# Patient Record
Sex: FEMALE | Race: WHITE | Employment: UNEMPLOYED | URBAN - METROPOLITAN AREA
[De-identification: names, ages, dates, MRNs, and addresses within clinical notes are randomized per-mention and may not be internally consistent; named-entity substitution may affect disease eponyms.]

---

## 2017-01-24 RX ORDER — BENAZEPRIL HYDROCHLORIDE 20 MG/1
TABLET ORAL
Qty: 90 TABLET | Refills: 1 | Status: SHIPPED | OUTPATIENT
Start: 2017-01-24 | End: 2017-07-23 | Stop reason: SDUPTHER

## 2017-03-28 ENCOUNTER — TELEPHONE (OUTPATIENT)
Dept: FAMILY MEDICINE CLINIC | Age: 52
End: 2017-03-28

## 2017-03-28 DIAGNOSIS — E10.9 TYPE 1 DIABETES MELLITUS ON INSULIN THERAPY (HCC): Primary | ICD-10-CM

## 2017-03-28 DIAGNOSIS — Z13.5 DIABETIC RETINOPATHY SCREENING: ICD-10-CM

## 2017-04-13 ENCOUNTER — TELEPHONE (OUTPATIENT)
Dept: FAMILY MEDICINE CLINIC | Age: 52
End: 2017-04-13

## 2017-04-13 DIAGNOSIS — M25.551 BILATERAL HIP PAIN: Primary | ICD-10-CM

## 2017-04-13 DIAGNOSIS — M25.552 BILATERAL HIP PAIN: Primary | ICD-10-CM

## 2017-05-04 ENCOUNTER — OFFICE VISIT (OUTPATIENT)
Dept: FAMILY MEDICINE CLINIC | Age: 52
End: 2017-05-04

## 2017-05-04 VITALS
SYSTOLIC BLOOD PRESSURE: 143 MMHG | DIASTOLIC BLOOD PRESSURE: 78 MMHG | OXYGEN SATURATION: 97 % | WEIGHT: 223 LBS | BODY MASS INDEX: 42.1 KG/M2 | HEART RATE: 85 BPM | HEIGHT: 61 IN

## 2017-05-04 DIAGNOSIS — E10.9 TYPE 1 DIABETES MELLITUS ON INSULIN THERAPY (HCC): ICD-10-CM

## 2017-05-04 DIAGNOSIS — M16.0 BILATERAL HIP JOINT ARTHRITIS: ICD-10-CM

## 2017-05-04 DIAGNOSIS — E78.5 DYSLIPIDEMIA: ICD-10-CM

## 2017-05-04 DIAGNOSIS — D22.72: ICD-10-CM

## 2017-05-04 DIAGNOSIS — E10.9 TYPE 1 DIABETES MELLITUS ON INSULIN THERAPY (HCC): Primary | ICD-10-CM

## 2017-05-04 DIAGNOSIS — R76.8 POSITIVE ANA (ANTINUCLEAR ANTIBODY): ICD-10-CM

## 2017-05-04 LAB
A/G RATIO: 1.7 (ref 1.1–2.2)
ALBUMIN SERPL-MCNC: 4.2 G/DL (ref 3.4–5)
ALP BLD-CCNC: 94 U/L (ref 40–129)
ALT SERPL-CCNC: 11 U/L (ref 10–40)
ANION GAP SERPL CALCULATED.3IONS-SCNC: 15 MMOL/L (ref 3–16)
AST SERPL-CCNC: 11 U/L (ref 15–37)
BILIRUB SERPL-MCNC: 0.3 MG/DL (ref 0–1)
BUN BLDV-MCNC: 11 MG/DL (ref 7–20)
CALCIUM SERPL-MCNC: 9.3 MG/DL (ref 8.3–10.6)
CHLORIDE BLD-SCNC: 103 MMOL/L (ref 99–110)
CHOLESTEROL, TOTAL: 305 MG/DL (ref 0–199)
CO2: 25 MMOL/L (ref 21–32)
CREAT SERPL-MCNC: 0.8 MG/DL (ref 0.6–1.1)
GFR AFRICAN AMERICAN: >60
GFR NON-AFRICAN AMERICAN: >60
GLOBULIN: 2.5 G/DL
GLUCOSE BLD-MCNC: 118 MG/DL (ref 70–99)
HCT VFR BLD CALC: 42.6 % (ref 36–48)
HDLC SERPL-MCNC: 55 MG/DL (ref 40–60)
HEMOGLOBIN: 14.3 G/DL (ref 12–16)
LDL CHOLESTEROL CALCULATED: 225 MG/DL
MCH RBC QN AUTO: 30.3 PG (ref 26–34)
MCHC RBC AUTO-ENTMCNC: 33.7 G/DL (ref 31–36)
MCV RBC AUTO: 89.9 FL (ref 80–100)
PDW BLD-RTO: 13.2 % (ref 12.4–15.4)
PLATELET # BLD: 160 K/UL (ref 135–450)
PMV BLD AUTO: 11.4 FL (ref 5–10.5)
POTASSIUM SERPL-SCNC: 4.6 MMOL/L (ref 3.5–5.1)
RBC # BLD: 4.74 M/UL (ref 4–5.2)
SEDIMENTATION RATE, ERYTHROCYTE: 13 MM/HR (ref 0–30)
SODIUM BLD-SCNC: 143 MMOL/L (ref 136–145)
TOTAL CK: 61 U/L (ref 26–192)
TOTAL PROTEIN: 6.7 G/DL (ref 6.4–8.2)
TRIGL SERPL-MCNC: 124 MG/DL (ref 0–150)
VLDLC SERPL CALC-MCNC: 25 MG/DL
WBC # BLD: 5.8 K/UL (ref 4–11)

## 2017-05-04 PROCEDURE — 99213 OFFICE O/P EST LOW 20 MIN: CPT | Performed by: FAMILY MEDICINE

## 2017-05-04 RX ORDER — INSULIN GLARGINE 100 [IU]/ML
INJECTION, SOLUTION SUBCUTANEOUS
Qty: 5 VIAL | Refills: 2 | Status: SHIPPED | OUTPATIENT
Start: 2017-05-04 | End: 2017-11-01 | Stop reason: SDUPTHER

## 2017-05-04 RX ORDER — ROSUVASTATIN CALCIUM 10 MG/1
10 TABLET, COATED ORAL NIGHTLY
Qty: 90 TABLET | Refills: 3 | Status: SHIPPED | OUTPATIENT
Start: 2017-05-04 | End: 2018-07-30 | Stop reason: SDUPTHER

## 2017-05-04 ASSESSMENT — PATIENT HEALTH QUESTIONNAIRE - PHQ9
SUM OF ALL RESPONSES TO PHQ QUESTIONS 1-9: 1
SUM OF ALL RESPONSES TO PHQ9 QUESTIONS 1 & 2: 1
2. FEELING DOWN, DEPRESSED OR HOPELESS: 1
1. LITTLE INTEREST OR PLEASURE IN DOING THINGS: 0

## 2017-05-05 LAB
ESTIMATED AVERAGE GLUCOSE: 185.8 MG/DL
HBA1C MFR BLD: 8.1 %

## 2017-05-06 LAB — CCP IGG ANTIBODIES: 3 UNITS (ref 0–19)

## 2017-06-20 ENCOUNTER — PATIENT MESSAGE (OUTPATIENT)
Dept: FAMILY MEDICINE CLINIC | Age: 52
End: 2017-06-20

## 2017-06-20 DIAGNOSIS — G89.29 CHRONIC RIGHT SHOULDER PAIN: Primary | ICD-10-CM

## 2017-06-20 DIAGNOSIS — M25.511 CHRONIC RIGHT SHOULDER PAIN: Primary | ICD-10-CM

## 2017-07-11 ENCOUNTER — OFFICE VISIT (OUTPATIENT)
Dept: ORTHOPEDIC SURGERY | Age: 52
End: 2017-07-11

## 2017-07-11 VITALS
DIASTOLIC BLOOD PRESSURE: 78 MMHG | WEIGHT: 223 LBS | HEIGHT: 61 IN | HEART RATE: 87 BPM | BODY MASS INDEX: 42.1 KG/M2 | SYSTOLIC BLOOD PRESSURE: 136 MMHG

## 2017-07-11 DIAGNOSIS — M75.01 ADHESIVE CAPSULITIS OF RIGHT SHOULDER: Primary | ICD-10-CM

## 2017-07-11 DIAGNOSIS — M25.511 RIGHT SHOULDER PAIN, UNSPECIFIED CHRONICITY: ICD-10-CM

## 2017-07-11 PROCEDURE — 20610 DRAIN/INJ JOINT/BURSA W/O US: CPT | Performed by: ORTHOPAEDIC SURGERY

## 2017-07-11 PROCEDURE — 99214 OFFICE O/P EST MOD 30 MIN: CPT | Performed by: ORTHOPAEDIC SURGERY

## 2017-07-17 ENCOUNTER — EVALUATION (OUTPATIENT)
Dept: PHYSICAL THERAPY | Age: 52
End: 2017-07-17

## 2017-07-17 DIAGNOSIS — M25.511 PAIN IN JOINT OF RIGHT SHOULDER: ICD-10-CM

## 2017-07-17 DIAGNOSIS — M75.01 ADHESIVE CAPSULITIS OF RIGHT SHOULDER: Primary | ICD-10-CM

## 2017-07-24 ENCOUNTER — EVALUATION (OUTPATIENT)
Dept: PHYSICAL THERAPY | Age: 52
End: 2017-07-24

## 2017-07-24 DIAGNOSIS — M25.511 PAIN IN JOINT OF RIGHT SHOULDER: ICD-10-CM

## 2017-07-24 DIAGNOSIS — M75.01 ADHESIVE CAPSULITIS OF RIGHT SHOULDER: Primary | ICD-10-CM

## 2017-07-24 PROCEDURE — G8985 CARRY GOAL STATUS: HCPCS | Performed by: PHYSICAL THERAPIST

## 2017-07-24 PROCEDURE — G8984 CARRY CURRENT STATUS: HCPCS | Performed by: PHYSICAL THERAPIST

## 2017-07-24 PROCEDURE — 97110 THERAPEUTIC EXERCISES: CPT | Performed by: PHYSICAL THERAPIST

## 2017-07-24 PROCEDURE — 97161 PT EVAL LOW COMPLEX 20 MIN: CPT | Performed by: PHYSICAL THERAPIST

## 2017-07-24 PROCEDURE — 97112 NEUROMUSCULAR REEDUCATION: CPT | Performed by: PHYSICAL THERAPIST

## 2017-07-25 RX ORDER — BENAZEPRIL HYDROCHLORIDE 20 MG/1
TABLET ORAL
Qty: 90 TABLET | Refills: 1 | Status: SHIPPED | OUTPATIENT
Start: 2017-07-25 | End: 2018-01-21 | Stop reason: SDUPTHER

## 2017-07-26 ENCOUNTER — TREATMENT (OUTPATIENT)
Dept: PHYSICAL THERAPY | Age: 52
End: 2017-07-26

## 2017-07-26 DIAGNOSIS — M75.01 ADHESIVE CAPSULITIS OF RIGHT SHOULDER: Primary | ICD-10-CM

## 2017-07-26 DIAGNOSIS — M25.511 PAIN IN JOINT OF RIGHT SHOULDER: ICD-10-CM

## 2017-07-26 PROCEDURE — 97530 THERAPEUTIC ACTIVITIES: CPT | Performed by: PHYSICAL THERAPIST

## 2017-07-26 PROCEDURE — 97140 MANUAL THERAPY 1/> REGIONS: CPT | Performed by: PHYSICAL THERAPIST

## 2017-07-26 PROCEDURE — 97110 THERAPEUTIC EXERCISES: CPT | Performed by: PHYSICAL THERAPIST

## 2017-07-26 PROCEDURE — 97112 NEUROMUSCULAR REEDUCATION: CPT | Performed by: PHYSICAL THERAPIST

## 2017-07-31 ENCOUNTER — TREATMENT (OUTPATIENT)
Dept: PHYSICAL THERAPY | Age: 52
End: 2017-07-31

## 2017-07-31 DIAGNOSIS — M75.01 ADHESIVE CAPSULITIS OF RIGHT SHOULDER: Primary | ICD-10-CM

## 2017-07-31 DIAGNOSIS — M25.511 PAIN IN JOINT OF RIGHT SHOULDER: ICD-10-CM

## 2017-07-31 PROCEDURE — 97140 MANUAL THERAPY 1/> REGIONS: CPT | Performed by: PHYSICAL THERAPIST

## 2017-07-31 PROCEDURE — 97530 THERAPEUTIC ACTIVITIES: CPT | Performed by: PHYSICAL THERAPIST

## 2017-07-31 PROCEDURE — 97110 THERAPEUTIC EXERCISES: CPT | Performed by: PHYSICAL THERAPIST

## 2017-08-03 ENCOUNTER — TREATMENT (OUTPATIENT)
Dept: PHYSICAL THERAPY | Age: 52
End: 2017-08-03

## 2017-08-03 DIAGNOSIS — M75.01 ADHESIVE CAPSULITIS OF RIGHT SHOULDER: Primary | ICD-10-CM

## 2017-08-03 DIAGNOSIS — M25.511 PAIN IN JOINT OF RIGHT SHOULDER: ICD-10-CM

## 2017-08-03 PROCEDURE — 97530 THERAPEUTIC ACTIVITIES: CPT | Performed by: PHYSICAL THERAPIST

## 2017-08-03 PROCEDURE — 97110 THERAPEUTIC EXERCISES: CPT | Performed by: PHYSICAL THERAPIST

## 2017-08-03 PROCEDURE — 97140 MANUAL THERAPY 1/> REGIONS: CPT | Performed by: PHYSICAL THERAPIST

## 2017-08-03 PROCEDURE — 97112 NEUROMUSCULAR REEDUCATION: CPT | Performed by: PHYSICAL THERAPIST

## 2017-08-15 ENCOUNTER — TREATMENT (OUTPATIENT)
Dept: PHYSICAL THERAPY | Age: 52
End: 2017-08-15

## 2017-08-15 DIAGNOSIS — M25.511 PAIN IN JOINT OF RIGHT SHOULDER: ICD-10-CM

## 2017-08-15 DIAGNOSIS — M75.01 ADHESIVE CAPSULITIS OF RIGHT SHOULDER: Primary | ICD-10-CM

## 2017-08-15 PROCEDURE — 97110 THERAPEUTIC EXERCISES: CPT | Performed by: PHYSICAL THERAPIST

## 2017-08-15 PROCEDURE — 97140 MANUAL THERAPY 1/> REGIONS: CPT | Performed by: PHYSICAL THERAPIST

## 2017-08-15 PROCEDURE — 97530 THERAPEUTIC ACTIVITIES: CPT | Performed by: PHYSICAL THERAPIST

## 2017-08-18 ENCOUNTER — TREATMENT (OUTPATIENT)
Dept: PHYSICAL THERAPY | Age: 52
End: 2017-08-18

## 2017-08-18 DIAGNOSIS — M25.511 PAIN IN JOINT OF RIGHT SHOULDER: ICD-10-CM

## 2017-08-18 DIAGNOSIS — M75.01 ADHESIVE CAPSULITIS OF RIGHT SHOULDER: Primary | ICD-10-CM

## 2017-08-22 ENCOUNTER — OFFICE VISIT (OUTPATIENT)
Dept: ORTHOPEDIC SURGERY | Age: 52
End: 2017-08-22

## 2017-08-22 ENCOUNTER — TREATMENT (OUTPATIENT)
Dept: PHYSICAL THERAPY | Age: 52
End: 2017-08-22

## 2017-08-22 VITALS
SYSTOLIC BLOOD PRESSURE: 142 MMHG | HEIGHT: 61 IN | WEIGHT: 223.11 LBS | DIASTOLIC BLOOD PRESSURE: 78 MMHG | HEART RATE: 80 BPM | BODY MASS INDEX: 42.12 KG/M2

## 2017-08-22 DIAGNOSIS — M25.511 PAIN IN JOINT OF RIGHT SHOULDER: ICD-10-CM

## 2017-08-22 DIAGNOSIS — M75.01 ADHESIVE CAPSULITIS OF RIGHT SHOULDER: Primary | ICD-10-CM

## 2017-08-22 PROCEDURE — 97140 MANUAL THERAPY 1/> REGIONS: CPT | Performed by: PHYSICAL THERAPIST

## 2017-08-22 PROCEDURE — 97530 THERAPEUTIC ACTIVITIES: CPT | Performed by: PHYSICAL THERAPIST

## 2017-08-22 PROCEDURE — 97110 THERAPEUTIC EXERCISES: CPT | Performed by: PHYSICAL THERAPIST

## 2017-08-22 PROCEDURE — 99213 OFFICE O/P EST LOW 20 MIN: CPT | Performed by: ORTHOPAEDIC SURGERY

## 2017-08-22 RX ORDER — TRAMADOL HYDROCHLORIDE 50 MG/1
50 TABLET ORAL EVERY 6 HOURS PRN
Qty: 40 TABLET | Refills: 0 | Status: SHIPPED | OUTPATIENT
Start: 2017-08-22 | End: 2017-09-01

## 2017-08-22 RX ORDER — MELOXICAM 15 MG/1
TABLET ORAL
Qty: 30 TABLET | Refills: 3 | Status: SHIPPED | OUTPATIENT
Start: 2017-08-22 | End: 2018-02-23 | Stop reason: ALTCHOICE

## 2017-08-24 ENCOUNTER — TREATMENT (OUTPATIENT)
Dept: PHYSICAL THERAPY | Age: 52
End: 2017-08-24

## 2017-08-24 DIAGNOSIS — M25.511 PAIN IN JOINT OF RIGHT SHOULDER: ICD-10-CM

## 2017-08-24 DIAGNOSIS — M75.01 ADHESIVE CAPSULITIS OF RIGHT SHOULDER: Primary | ICD-10-CM

## 2017-08-24 PROCEDURE — 97110 THERAPEUTIC EXERCISES: CPT | Performed by: PHYSICAL THERAPIST

## 2017-08-24 PROCEDURE — 97140 MANUAL THERAPY 1/> REGIONS: CPT | Performed by: PHYSICAL THERAPIST

## 2017-08-24 PROCEDURE — 97530 THERAPEUTIC ACTIVITIES: CPT | Performed by: PHYSICAL THERAPIST

## 2017-08-29 ENCOUNTER — TREATMENT (OUTPATIENT)
Dept: PHYSICAL THERAPY | Age: 52
End: 2017-08-29

## 2017-08-29 DIAGNOSIS — M25.511 PAIN IN JOINT OF RIGHT SHOULDER: ICD-10-CM

## 2017-08-29 DIAGNOSIS — M75.01 ADHESIVE CAPSULITIS OF RIGHT SHOULDER: Primary | ICD-10-CM

## 2017-08-29 PROCEDURE — G8985 CARRY GOAL STATUS: HCPCS | Performed by: PHYSICAL THERAPIST

## 2017-08-29 PROCEDURE — 97140 MANUAL THERAPY 1/> REGIONS: CPT | Performed by: PHYSICAL THERAPIST

## 2017-08-29 PROCEDURE — 97110 THERAPEUTIC EXERCISES: CPT | Performed by: PHYSICAL THERAPIST

## 2017-08-29 PROCEDURE — G8984 CARRY CURRENT STATUS: HCPCS | Performed by: PHYSICAL THERAPIST

## 2017-08-29 PROCEDURE — 97530 THERAPEUTIC ACTIVITIES: CPT | Performed by: PHYSICAL THERAPIST

## 2017-08-30 ENCOUNTER — OFFICE VISIT (OUTPATIENT)
Dept: FAMILY MEDICINE CLINIC | Age: 52
End: 2017-08-30

## 2017-08-30 VITALS
RESPIRATION RATE: 20 BRPM | WEIGHT: 223 LBS | HEART RATE: 96 BPM | OXYGEN SATURATION: 97 % | TEMPERATURE: 98.7 F | BODY MASS INDEX: 42.1 KG/M2 | DIASTOLIC BLOOD PRESSURE: 55 MMHG | SYSTOLIC BLOOD PRESSURE: 139 MMHG | HEIGHT: 61 IN

## 2017-08-30 DIAGNOSIS — E10.9 TYPE 1 DIABETES MELLITUS WITHOUT COMPLICATION (HCC): ICD-10-CM

## 2017-08-30 DIAGNOSIS — E78.5 OTHER AND UNSPECIFIED HYPERLIPIDEMIA: ICD-10-CM

## 2017-08-30 DIAGNOSIS — E10.9 TYPE 1 DIABETES MELLITUS WITHOUT COMPLICATION (HCC): Primary | ICD-10-CM

## 2017-08-30 DIAGNOSIS — R07.9 CHEST PAIN, UNSPECIFIED TYPE: ICD-10-CM

## 2017-08-30 DIAGNOSIS — R23.2 HOT FLASHES: ICD-10-CM

## 2017-08-30 LAB
A/G RATIO: 1.6 (ref 1.1–2.2)
ALBUMIN SERPL-MCNC: 3.9 G/DL (ref 3.4–5)
ALP BLD-CCNC: 71 U/L (ref 40–129)
ALT SERPL-CCNC: 9 U/L (ref 10–40)
ANION GAP SERPL CALCULATED.3IONS-SCNC: 13 MMOL/L (ref 3–16)
AST SERPL-CCNC: 11 U/L (ref 15–37)
BILIRUB SERPL-MCNC: 0.4 MG/DL (ref 0–1)
BUN BLDV-MCNC: 10 MG/DL (ref 7–20)
CALCIUM SERPL-MCNC: 9.4 MG/DL (ref 8.3–10.6)
CHLORIDE BLD-SCNC: 103 MMOL/L (ref 99–110)
CO2: 26 MMOL/L (ref 21–32)
CREAT SERPL-MCNC: 0.6 MG/DL (ref 0.6–1.1)
CREATININE URINE: 69.1 MG/DL (ref 28–259)
GFR AFRICAN AMERICAN: >60
GFR NON-AFRICAN AMERICAN: >60
GLOBULIN: 2.5 G/DL
GLUCOSE BLD-MCNC: 211 MG/DL (ref 70–99)
MICROALBUMIN UR-MCNC: 6.5 MG/DL
MICROALBUMIN/CREAT UR-RTO: 94.1 MG/G (ref 0–30)
POTASSIUM SERPL-SCNC: 4.2 MMOL/L (ref 3.5–5.1)
PRO-BNP: 107 PG/ML (ref 0–124)
SODIUM BLD-SCNC: 142 MMOL/L (ref 136–145)
TOTAL PROTEIN: 6.4 G/DL (ref 6.4–8.2)
TROPONIN: <0.01 NG/ML

## 2017-08-30 PROCEDURE — 93000 ELECTROCARDIOGRAM COMPLETE: CPT | Performed by: FAMILY MEDICINE

## 2017-08-30 PROCEDURE — 99214 OFFICE O/P EST MOD 30 MIN: CPT | Performed by: FAMILY MEDICINE

## 2017-08-30 RX ORDER — VENLAFAXINE HYDROCHLORIDE 75 MG/1
75 CAPSULE, EXTENDED RELEASE ORAL DAILY
Qty: 30 CAPSULE | Refills: 3 | Status: SHIPPED | OUTPATIENT
Start: 2017-08-30 | End: 2017-12-12 | Stop reason: ALTCHOICE

## 2017-08-31 ENCOUNTER — TREATMENT (OUTPATIENT)
Dept: PHYSICAL THERAPY | Age: 52
End: 2017-08-31

## 2017-08-31 DIAGNOSIS — M25.511 PAIN IN JOINT OF RIGHT SHOULDER: ICD-10-CM

## 2017-08-31 DIAGNOSIS — M75.01 ADHESIVE CAPSULITIS OF RIGHT SHOULDER: Primary | ICD-10-CM

## 2017-08-31 LAB
ESTIMATED AVERAGE GLUCOSE: 177.2 MG/DL
HBA1C MFR BLD: 7.8 %

## 2017-08-31 PROCEDURE — 97530 THERAPEUTIC ACTIVITIES: CPT | Performed by: PHYSICAL THERAPIST

## 2017-08-31 PROCEDURE — 97110 THERAPEUTIC EXERCISES: CPT | Performed by: PHYSICAL THERAPIST

## 2017-08-31 PROCEDURE — 97140 MANUAL THERAPY 1/> REGIONS: CPT | Performed by: PHYSICAL THERAPIST

## 2017-09-05 ENCOUNTER — TREATMENT (OUTPATIENT)
Dept: PHYSICAL THERAPY | Age: 52
End: 2017-09-05

## 2017-09-05 DIAGNOSIS — M75.01 ADHESIVE CAPSULITIS OF RIGHT SHOULDER: Primary | ICD-10-CM

## 2017-09-05 DIAGNOSIS — M25.511 PAIN IN JOINT OF RIGHT SHOULDER: ICD-10-CM

## 2017-09-07 ENCOUNTER — TELEPHONE (OUTPATIENT)
Dept: FAMILY MEDICINE CLINIC | Age: 52
End: 2017-09-07

## 2017-09-07 ENCOUNTER — TREATMENT (OUTPATIENT)
Dept: PHYSICAL THERAPY | Age: 52
End: 2017-09-07

## 2017-09-07 DIAGNOSIS — M75.01 ADHESIVE CAPSULITIS OF RIGHT SHOULDER: Primary | ICD-10-CM

## 2017-09-07 DIAGNOSIS — M25.511 PAIN IN JOINT OF RIGHT SHOULDER: ICD-10-CM

## 2017-09-07 PROCEDURE — 97530 THERAPEUTIC ACTIVITIES: CPT | Performed by: PHYSICAL THERAPIST

## 2017-09-07 PROCEDURE — 97110 THERAPEUTIC EXERCISES: CPT | Performed by: PHYSICAL THERAPIST

## 2017-09-07 PROCEDURE — 97140 MANUAL THERAPY 1/> REGIONS: CPT | Performed by: PHYSICAL THERAPIST

## 2017-09-12 ENCOUNTER — TREATMENT (OUTPATIENT)
Dept: PHYSICAL THERAPY | Age: 52
End: 2017-09-12

## 2017-09-12 DIAGNOSIS — M25.511 PAIN IN JOINT OF RIGHT SHOULDER: ICD-10-CM

## 2017-09-12 DIAGNOSIS — M75.01 ADHESIVE CAPSULITIS OF RIGHT SHOULDER: Primary | ICD-10-CM

## 2017-09-12 PROCEDURE — 97140 MANUAL THERAPY 1/> REGIONS: CPT | Performed by: PHYSICAL THERAPIST

## 2017-09-12 PROCEDURE — 97110 THERAPEUTIC EXERCISES: CPT | Performed by: PHYSICAL THERAPIST

## 2017-09-12 PROCEDURE — 97530 THERAPEUTIC ACTIVITIES: CPT | Performed by: PHYSICAL THERAPIST

## 2017-09-19 ENCOUNTER — TREATMENT (OUTPATIENT)
Dept: PHYSICAL THERAPY | Age: 52
End: 2017-09-19

## 2017-09-19 DIAGNOSIS — M75.01 ADHESIVE CAPSULITIS OF RIGHT SHOULDER: Primary | ICD-10-CM

## 2017-09-19 DIAGNOSIS — M25.511 PAIN IN JOINT OF RIGHT SHOULDER: ICD-10-CM

## 2017-09-19 PROCEDURE — 97110 THERAPEUTIC EXERCISES: CPT | Performed by: PHYSICAL THERAPIST

## 2017-09-19 PROCEDURE — 97140 MANUAL THERAPY 1/> REGIONS: CPT | Performed by: PHYSICAL THERAPIST

## 2017-09-19 PROCEDURE — 97530 THERAPEUTIC ACTIVITIES: CPT | Performed by: PHYSICAL THERAPIST

## 2017-09-21 ENCOUNTER — OFFICE VISIT (OUTPATIENT)
Dept: ORTHOPEDIC SURGERY | Age: 52
End: 2017-09-21

## 2017-09-21 ENCOUNTER — TREATMENT (OUTPATIENT)
Dept: PHYSICAL THERAPY | Age: 52
End: 2017-09-21

## 2017-09-21 VITALS
HEART RATE: 85 BPM | HEIGHT: 61 IN | DIASTOLIC BLOOD PRESSURE: 72 MMHG | SYSTOLIC BLOOD PRESSURE: 135 MMHG | WEIGHT: 223.11 LBS | BODY MASS INDEX: 42.12 KG/M2

## 2017-09-21 DIAGNOSIS — M25.511 PAIN IN JOINT OF RIGHT SHOULDER: ICD-10-CM

## 2017-09-21 DIAGNOSIS — M75.01 ADHESIVE CAPSULITIS OF RIGHT SHOULDER: Primary | ICD-10-CM

## 2017-09-21 DIAGNOSIS — M67.911 DISORDER OF RIGHT ROTATOR CUFF: ICD-10-CM

## 2017-09-21 PROCEDURE — 99213 OFFICE O/P EST LOW 20 MIN: CPT | Performed by: ORTHOPAEDIC SURGERY

## 2017-09-21 PROCEDURE — 97140 MANUAL THERAPY 1/> REGIONS: CPT | Performed by: PHYSICAL THERAPIST

## 2017-09-21 PROCEDURE — 97110 THERAPEUTIC EXERCISES: CPT | Performed by: PHYSICAL THERAPIST

## 2017-09-21 PROCEDURE — 97530 THERAPEUTIC ACTIVITIES: CPT | Performed by: PHYSICAL THERAPIST

## 2017-09-21 RX ORDER — HYDROCODONE BITARTRATE AND ACETAMINOPHEN 5; 325 MG/1; MG/1
1 TABLET ORAL EVERY 6 HOURS PRN
Qty: 40 TABLET | Refills: 0 | Status: SHIPPED | OUTPATIENT
Start: 2017-09-21 | End: 2017-09-28

## 2017-09-28 ENCOUNTER — TREATMENT (OUTPATIENT)
Dept: PHYSICAL THERAPY | Age: 52
End: 2017-09-28

## 2017-09-28 DIAGNOSIS — M75.01 ADHESIVE CAPSULITIS OF RIGHT SHOULDER: Primary | ICD-10-CM

## 2017-09-28 DIAGNOSIS — M25.511 PAIN IN JOINT OF RIGHT SHOULDER: ICD-10-CM

## 2017-10-18 ENCOUNTER — OFFICE VISIT (OUTPATIENT)
Dept: ORTHOPEDIC SURGERY | Age: 52
End: 2017-10-18

## 2017-10-18 VITALS
SYSTOLIC BLOOD PRESSURE: 148 MMHG | WEIGHT: 223 LBS | DIASTOLIC BLOOD PRESSURE: 80 MMHG | HEIGHT: 61 IN | BODY MASS INDEX: 42.1 KG/M2 | HEART RATE: 90 BPM

## 2017-10-18 DIAGNOSIS — M67.921 TENDINOPATHY OF RIGHT BICEPS TENDON: ICD-10-CM

## 2017-10-18 DIAGNOSIS — M75.01 ADHESIVE CAPSULITIS OF RIGHT SHOULDER: Primary | ICD-10-CM

## 2017-10-18 PROCEDURE — 99214 OFFICE O/P EST MOD 30 MIN: CPT | Performed by: ORTHOPAEDIC SURGERY

## 2017-10-18 NOTE — PROGRESS NOTES
Date:  10/18/2017    Name:  Eileen Stephen  Address:  [address] [facility] [address]    :  1965      Age:   47 y.o.    SSN:  [id number]      Medical Record Number:  [id number]    Reason for Visit:    Chief Complaint    Follow-up (rt shoulder)      DOS:10/18/2017     HPI: Steph Hampton is a 47 y.o. female here today for follow-up of her right shoulder. Patient was seen in our clinic last week and we arrived at a diagnosis of adhesive capsulitis. However, as well, we were concerns over the possibility of some coexisting rotator cuff disease. As such we referred her for an MRI of her right shoulder. She returns today to review those results. Pain Assessment  Location of Pain: Shoulder  Location Modifiers: Right  Severity of Pain: 4  Quality of Pain: Sharp, Dull, Aching  Duration of Pain: Persistent  Frequency of Pain: Intermittent  Limiting Behavior: Yes  Result of Injury: No  Work-Related Injury: No  Are there other pain locations you wish to document?: No  ROS: All systems reviewed on patient intake form. Pertinent items are noted in HPI.         Past Medical History:   Diagnosis Date    Acute upper respiratory infections of unspecified site 2010    Anxiety state, unspecified 2010    Carpal tunnel syndrome 2010    Ingrowing nail 2010    Other and unspecified hyperlipidemia 2010    Pain in joint, pelvic region and thigh 2010    Screening 2010    Type I (juvenile type) diabetes mellitus without mention of complication, not stated as uncontrolled 2010    Unspecified arthropathy, ankle and foot 2010    Unspecified essential hypertension 2010    Urinary tract infection, site not specified 2010    Wrist fracture     left        Past Surgical History:   Procedure Laterality Date    CARPAL TUNNEL RELEASE Bilateral      SECTION      FINGER TRIGGER RELEASE      3 of them    TUBAL LIGATION      WRIST SURGERY  3/08 wrist fracture       Family History   Problem Relation Age of Onset    Asthma Mother     Other Mother      history of blood clots    Cancer Paternal Grandmother      throat    Birth Defects Neg Hx     Depression Neg Hx     Early Death Neg Hx     High Blood Pressure Neg Hx        Social History     Social History    Marital status:      Spouse name: N/A    Number of children: 3    Years of education: N/A     Occupational History    stay at home Mom      Social History Main Topics    Smoking status: Never Smoker    Smokeless tobacco: Never Used    Alcohol use No    Drug use: No    Sexual activity: Not Asked     Other Topics Concern    None     Social History Narrative    None       Current Outpatient Prescriptions   Medication Sig Dispense Refill    B-D INS SYRINGE 0.5CC/30GX1/2\" 30G X 1/2\" 0.5 ML MISC USE TO INJECT UNDER THE SKIN 6 TIMES DAILY 54 each 1    venlafaxine (EFFEXOR XR) 75 MG extended release capsule Take 1 capsule by mouth daily 30 capsule 3    meloxicam (MOBIC) 15 MG tablet 1 po qday 30 tablet 3    benazepril (LOTENSIN) 20 MG tablet TAKE 1 TABLET DAILY 90 tablet 1    insulin lispro (HUMALOG) 100 UNIT/ML injection vial Sliding scale 5 vial 2    insulin glargine (LANTUS) 100 UNIT/ML injection vial INJECT 43 UNITS INTO THE SKIN NIGHTLY (Patient taking differently: INJECT 40 UNITS INTO THE SKIN NIGHTLY) 5 vial 2    rosuvastatin (CRESTOR) 10 MG tablet Take 1 tablet by mouth nightly 90 tablet 3    glucose blood VI test strips (PRECISION XTRA TEST STRIPS) strip TEST BLOOD SUGAR 7-8 TIMES A DAY or as directed by physician 300 strip 11    diazepam (VALIUM) 5 MG tablet If meclizine is not improving dizziness, take 1/2 to 1 tab po q24h prn dizziness. 30 tablet 0    atorvastatin (LIPITOR) 40 MG tablet Take 1 tablet by mouth daily 90 tablet 3    aspirin 81 MG EC tablet Take 81 mg by mouth daily. No current facility-administered medications for this visit.         Allergies possible surgical intervention being done in January 2018    No orders of the defined types were placed in this encounter. [facility]  Date:    10/18/2017    This dictation was performed with a verbal recognition program [facility]) and it was checked for errors. It is possible that there are still dictated errors within this office note. If so, please bring any errors to my attention for an addendum. All efforts were made to ensure that this office note is accurate. Attestation  I attest that my encounter today with patient Eryn Strauss  was supervised by Dr. Davie Knox  ___________  I was physically present and personally supervised the Orthopaedic Sports Medicine Fellow in the evaluation and development of a treatment plan for this patient. I personally interviewed the patient and performed a physical examination. In addition, I discussed the patient's condition and treatment options with them. I have also reviewed and agree with the past medical, family and social history unless otherwise noted. All of the patient's questions were answered. Maria Esther Knox MD, PhD  10/18/2017

## 2017-10-30 ENCOUNTER — TELEPHONE (OUTPATIENT)
Dept: FAMILY MEDICINE CLINIC | Age: 52
End: 2017-10-30

## 2017-11-01 DIAGNOSIS — E10.9 TYPE 1 DIABETES MELLITUS ON INSULIN THERAPY (HCC): ICD-10-CM

## 2017-11-01 RX ORDER — INSULIN GLARGINE 100 [IU]/ML
INJECTION, SOLUTION SUBCUTANEOUS
Qty: 5 VIAL | Refills: 2 | Status: SHIPPED | OUTPATIENT
Start: 2017-11-01 | End: 2018-12-07 | Stop reason: SDUPTHER

## 2017-11-01 NOTE — TELEPHONE ENCOUNTER
From: Aldo Rodgers  Sent: 10/30/2017 9:34 AM EDT  Subject: Medication Renewal Request    Pascal Peak L. Bleser would like a refill of the following medications:  glucose blood VI test strips (PRECISION XTRA TEST STRIPS) strip [Yohannes Aguila DO]  insulin glargine (LANTUS) 100 UNIT/ML injection vial Madelyn Ordoñez DO]    Preferred pharmacy: [address] [place] [address] , [address] [facility] [phone] - F [phone]    Comment:

## 2017-12-11 ENCOUNTER — TELEPHONE (OUTPATIENT)
Dept: ORTHOPEDIC SURGERY | Age: 52
End: 2017-12-11

## 2017-12-12 ENCOUNTER — OFFICE VISIT (OUTPATIENT)
Dept: ORTHOPEDIC SURGERY | Age: 52
End: 2017-12-12

## 2017-12-12 VITALS
BODY MASS INDEX: 42.12 KG/M2 | HEART RATE: 89 BPM | DIASTOLIC BLOOD PRESSURE: 61 MMHG | WEIGHT: 223.11 LBS | HEIGHT: 61 IN | SYSTOLIC BLOOD PRESSURE: 156 MMHG

## 2017-12-12 DIAGNOSIS — M75.01 ADHESIVE CAPSULITIS OF RIGHT SHOULDER: Primary | ICD-10-CM

## 2017-12-12 DIAGNOSIS — M75.41 IMPINGEMENT SYNDROME OF RIGHT SHOULDER: ICD-10-CM

## 2017-12-12 PROCEDURE — 99213 OFFICE O/P EST LOW 20 MIN: CPT | Performed by: ORTHOPAEDIC SURGERY

## 2017-12-16 NOTE — PROGRESS NOTES
Review of Systems   Musculoskeletal: Positive for joint pain.
shoulder        Office Procedures:  No orders of the defined types were placed in this encounter. Treatment Plan:  Our plan is to go forward with right shoulder arthroscopic capsular release and decompression. Concurrent lesions to the rotator cuff and biceps will be addressed as these are encountered. Risks, benefits and potential complications of arthroscopic shoulder surgery were discussed with the patient. Risks discussed include but are not limited to bleeding, infection, anesthetic risk, injury to nerves and blood vessels, deep vein thrombosis, residual stiffness and weakness, and the need for revision surgery. The patient also understands that anesthetic risks include cardiopulmonary issues, drug reactions and even death. The patient voices an understanding of the importance of physical therapy and home exercises after surgery. All questions were answered and written informed consent for surgery was obtained today. Maria Esther Whatley MD, PhD  12/12/2017

## 2017-12-29 ENCOUNTER — OFFICE VISIT (OUTPATIENT)
Dept: FAMILY MEDICINE CLINIC | Age: 52
End: 2017-12-29

## 2017-12-29 VITALS
TEMPERATURE: 98.4 F | BODY MASS INDEX: 42.71 KG/M2 | RESPIRATION RATE: 16 BRPM | DIASTOLIC BLOOD PRESSURE: 74 MMHG | WEIGHT: 226.2 LBS | SYSTOLIC BLOOD PRESSURE: 144 MMHG | HEART RATE: 76 BPM | HEIGHT: 61 IN

## 2017-12-29 DIAGNOSIS — E10.9 TYPE 1 DIABETES MELLITUS ON INSULIN THERAPY (HCC): ICD-10-CM

## 2017-12-29 DIAGNOSIS — Z01.818 PREOP EXAMINATION: ICD-10-CM

## 2017-12-29 DIAGNOSIS — Z01.818 PREOP EXAMINATION: Primary | ICD-10-CM

## 2017-12-29 LAB
ALBUMIN SERPL-MCNC: 4 G/DL (ref 3.4–5)
ANION GAP SERPL CALCULATED.3IONS-SCNC: 10 MMOL/L (ref 3–16)
APTT: 31.7 SEC (ref 24.1–34.9)
BUN BLDV-MCNC: 8 MG/DL (ref 7–20)
CALCIUM SERPL-MCNC: 9.3 MG/DL (ref 8.3–10.6)
CHLORIDE BLD-SCNC: 100 MMOL/L (ref 99–110)
CHOLESTEROL, TOTAL: 182 MG/DL (ref 0–199)
CO2: 29 MMOL/L (ref 21–32)
CREAT SERPL-MCNC: 0.6 MG/DL (ref 0.6–1.1)
GFR AFRICAN AMERICAN: >60
GFR NON-AFRICAN AMERICAN: >60
GLUCOSE BLD-MCNC: 183 MG/DL (ref 70–99)
HCT VFR BLD CALC: 41 % (ref 36–48)
HDLC SERPL-MCNC: 55 MG/DL (ref 40–60)
HEMOGLOBIN: 13.9 G/DL (ref 12–16)
INR BLD: 0.89 (ref 0.85–1.15)
LDL CHOLESTEROL CALCULATED: 104 MG/DL
MCH RBC QN AUTO: 30.8 PG (ref 26–34)
MCHC RBC AUTO-ENTMCNC: 33.9 G/DL (ref 31–36)
MCV RBC AUTO: 90.9 FL (ref 80–100)
PDW BLD-RTO: 13.3 % (ref 12.4–15.4)
PHOSPHORUS: 3.8 MG/DL (ref 2.5–4.9)
PLATELET # BLD: 143 K/UL (ref 135–450)
PMV BLD AUTO: 12.3 FL (ref 5–10.5)
POTASSIUM SERPL-SCNC: 4.7 MMOL/L (ref 3.5–5.1)
PROTHROMBIN TIME: 10.1 SEC (ref 9.6–13)
RBC # BLD: 4.51 M/UL (ref 4–5.2)
SODIUM BLD-SCNC: 139 MMOL/L (ref 136–145)
TRIGL SERPL-MCNC: 114 MG/DL (ref 0–150)
VLDLC SERPL CALC-MCNC: 23 MG/DL
WBC # BLD: 6.4 K/UL (ref 4–11)

## 2017-12-29 PROCEDURE — 99241 PR OFFICE CONSULTATION NEW/ESTAB PATIENT 15 MIN: CPT | Performed by: FAMILY MEDICINE

## 2017-12-29 NOTE — PATIENT INSTRUCTIONS
1) Since BP is just above goal, double up and take 2 of the benazepril tablets at the same time so BP is well controlled on day of surgery. Depending on BP during the day or surgery, we may eventually change to 40 mg pill. 2) Get your bloodwork done. If blood pressure is good, Dr. Allan Mcnally will clear you to proceed with surgery.

## 2017-12-29 NOTE — PROGRESS NOTES
[address] at [place] [address].  The current problem began > 1 year ago, and symptoms have been worsening with time. Conservative therapy: Yes: including medication and PT, which has been not very effective. Normal stress test performed in . EKG was unremarkable Aug 2017 except low voltage. Planned anesthesia: General   Known anesthesia problems: post anesthesia emesis. Bleeding risk: No recent or remote history of abnormal bleeding  Personal or FH of DVT/PE: No, not in patient but mother known to have had blood clots (leg and lungs).   Patient objection to receiving blood products: No    Patient Active Problem List   Diagnosis    History of carpal tunnel syndrome    HYPERLIPIDEMIA    Type 1 diabetes mellitus ([facility] -- diagnosed as 7year-old    Routine general medical examination at a health care facility    Lumbar disc disease    Palpitations    Arthritis, hip    Shoulder pain, bilateral    Murmur    Edema    Vertigo    BMI 40.0-44.9, adult ([facility]    Trigger middle finger of left hand    Type 1 diabetes mellitus on insulin therapy ([facility]    Dyslipidemia       Past Medical History:   Diagnosis Date    Acute upper respiratory infections of unspecified site 2010    Anxiety state, unspecified 2010    Carpal tunnel syndrome 2010    Ingrowing nail 2010    Other and unspecified hyperlipidemia 2010    Pain in joint, pelvic region and thigh 2010    Screening 2010    Type I (juvenile type) diabetes mellitus without mention of complication, not stated as uncontrolled 2010    Unspecified arthropathy, ankle and foot 2010    Unspecified essential hypertension 2010    Urinary tract infection, site not specified 2010    Wrist fracture     left     Past Surgical History:   Procedure Laterality Date    CARPAL TUNNEL RELEASE Bilateral      SECTION      FINGER TRIGGER RELEASE      3 of them   [place] [address] normal strength. No cranial nerve deficit or sensory deficit. Coordination and gait normal.   Skin: Skin is warm and dry. No rash noted. No erythema. Psychiatric: She has a normal mood and affect. Her behavior is normal.     EKG Interpretation:  See comment about EKG in Aug 2017. Lab Review: to be collected today. Assessment:       47 y.o. patient with planned surgery as above. Known risk factors for perioperative complications: Diabetes mellitus  Current medications which may produce withdrawal symptoms if withheld perioperatively: none      Plan:     1. Preoperative workup as follows: to obtain bloodwork today  2. Change in medication regimen before surgery: today increasing strength of benazepril from 20 mg daily to 40 mg daily  3. Prophylaxis for cardiac events with perioperative beta-blockers: Not indicated  ACC/AHA indications for pre-operative beta-blocker use:    · Vascular surgery with history of postitive stress test  · Intermediate or high risk surgery with history of CAD   · Intermediate or high risk surgery with multiple clinical predictors of CAD- 2 of the following: history of compensated or prior heart failure, history of cerebrovascular disease, DM, or renal insufficiency    Routine administration of higher-dose, long-acting metoprolol in beta-blockernaïve patients on the day of surgery, and in the absence of dose titration is associated with an overall increase in mortality. Beta-blockers should be started days to weeks prior to surgery and titrated to pulse < 70.  4. Deep vein thrombosis prophylaxis: regimen to be chosen by surgical team  5. Pending results of labwork, if labwork is acceptable, may proceed with surgery as planned. 6. This form and the [facility] form is completed. Copy given to patient.  Copy to be faxed to [facility] PAT team.

## 2017-12-30 LAB
ESTIMATED AVERAGE GLUCOSE: 188.6 MG/DL
HBA1C MFR BLD: 8.2 %

## 2018-01-03 ENCOUNTER — TELEPHONE (OUTPATIENT)
Dept: ORTHOPEDIC SURGERY | Age: 53
End: 2018-01-03

## 2018-01-03 NOTE — TELEPHONE ENCOUNTER
Please call patient regarding if she can take tylenol. Second calling to follow up on approval of her surgery.

## 2018-01-04 ENCOUNTER — TELEPHONE (OUTPATIENT)
Dept: FAMILY MEDICINE CLINIC | Age: 53
End: 2018-01-04

## 2018-01-04 ENCOUNTER — HOSPITAL ENCOUNTER (OUTPATIENT)
Dept: PREADMISSION TESTING | Age: 53
Discharge: HOME | End: 2018-01-04
Attending: ORTHOPAEDIC SURGERY | Admitting: ORTHOPAEDIC SURGERY

## 2018-01-04 VITALS — BODY MASS INDEX: 42.48 KG/M2 | WEIGHT: 225 LBS | HEIGHT: 61 IN

## 2018-01-04 RX ORDER — FENTANYL CITRATE 50 UG/ML
100 INJECTION, SOLUTION INTRAMUSCULAR; INTRAVENOUS ONCE
Status: COMPLETED | OUTPATIENT
Start: 2018-01-04 | End: 2018-01-05

## 2018-01-04 ASSESSMENT — PAIN DESCRIPTION - ORIENTATION: ORIENTATION: RIGHT

## 2018-01-04 ASSESSMENT — PAIN DESCRIPTION - DESCRIPTORS: DESCRIPTORS: ACHING;THROBBING

## 2018-01-04 ASSESSMENT — PAIN DESCRIPTION - PAIN TYPE: TYPE: CHRONIC PAIN

## 2018-01-04 ASSESSMENT — PAIN SCALES - GENERAL: PAINLEVEL_OUTOF10: 6

## 2018-01-04 ASSESSMENT — PAIN DESCRIPTION - LOCATION: LOCATION: SHOULDER;BACK

## 2018-01-04 ASSESSMENT — PAIN DESCRIPTION - FREQUENCY: FREQUENCY: CONTINUOUS

## 2018-01-04 ASSESSMENT — PAIN - FUNCTIONAL ASSESSMENT: PAIN_FUNCTIONAL_ASSESSMENT: 0-10

## 2018-01-04 NOTE — PRE-PROCEDURE INSTRUCTIONS
family touch your surgery site without cleaning their hands. 4. Mild nausea, headache, muscle aches, sore throat, or fatigue may occur after anesthesia. Should any of these symptoms become severe, or should you notice any signs of infection, you should call your surgeon. 5. Narcotic pain medications can cause significant constipation. You may want to add a stool softener to your postoperative medication schedule or speak to your surgeon on how best to manage this SIDE EFFECT. SPECIAL INSTRUCTIONS     Thank you for allowing us to care for you. We strive to exceed your expectations in the delivery of care and service provided to you and your family. If you need to contact us for any reason, please call us at [phone]    Instructions reviewed with patient during preadmission testing phone interview. Ekaterina Delaney. 1/4/2018 .8:58 AM      ADDITIONAL EDUCATIONAL INFORMATION REVIEWED PER PHONE WITH YOU AND/OR YOUR FAMILY:  Yes Bring a urine sample on day of surgery  Yes Pain Goal-Taking Control of Your Pain  Yes FAQs about Surgical Site Infections  Yes Hibiclens® Bathing Instructions / or Other Antibacterial Soap  No Incentive Spirometer Education  No Other

## 2018-01-05 ENCOUNTER — HOSPITAL ENCOUNTER (OUTPATIENT)
Dept: SURGERY | Age: 53
Discharge: HOME | End: 2018-01-05
Admitting: ORTHOPAEDIC SURGERY

## 2018-01-05 ENCOUNTER — EVALUATION (OUTPATIENT)
Dept: PHYSICAL THERAPY | Age: 53
End: 2018-01-05

## 2018-01-05 VITALS
WEIGHT: 225 LBS | HEIGHT: 61 IN | OXYGEN SATURATION: 93 % | SYSTOLIC BLOOD PRESSURE: 145 MMHG | DIASTOLIC BLOOD PRESSURE: 62 MMHG | TEMPERATURE: 98.6 F | HEART RATE: 107 BPM | RESPIRATION RATE: 21 BRPM | BODY MASS INDEX: 42.48 KG/M2

## 2018-01-05 DIAGNOSIS — M25.511 ACUTE PAIN OF BOTH SHOULDERS: ICD-10-CM

## 2018-01-05 DIAGNOSIS — G89.18 POST-OP PAIN: Primary | ICD-10-CM

## 2018-01-05 DIAGNOSIS — M25.511 PAIN IN JOINT OF RIGHT SHOULDER: ICD-10-CM

## 2018-01-05 DIAGNOSIS — M75.01 ADHESIVE CAPSULITIS OF RIGHT SHOULDER: Primary | ICD-10-CM

## 2018-01-05 DIAGNOSIS — M25.512 ACUTE PAIN OF BOTH SHOULDERS: ICD-10-CM

## 2018-01-05 LAB
GLUCOSE BLD-MCNC: 209 MG/DL (ref 70–99)
GLUCOSE BLD-MCNC: 247 MG/DL (ref 70–99)
GLUCOSE BLD-MCNC: 295 MG/DL (ref 70–99)
PERFORMED ON: ABNORMAL
PREGNANCY, URINE: NEGATIVE

## 2018-01-05 PROCEDURE — 97161 PT EVAL LOW COMPLEX 20 MIN: CPT | Performed by: PHYSICAL THERAPIST

## 2018-01-05 PROCEDURE — 97140 MANUAL THERAPY 1/> REGIONS: CPT | Performed by: PHYSICAL THERAPIST

## 2018-01-05 PROCEDURE — 97110 THERAPEUTIC EXERCISES: CPT | Performed by: PHYSICAL THERAPIST

## 2018-01-05 RX ORDER — SODIUM CHLORIDE 0.9 % (FLUSH) 0.9 %
10 SYRINGE (ML) INJECTION PRN
Status: DISCONTINUED | OUTPATIENT
Start: 2018-01-05 | End: 2018-01-06 | Stop reason: HOSPADM

## 2018-01-05 RX ORDER — SCOLOPAMINE TRANSDERMAL SYSTEM 1 MG/1
1 PATCH, EXTENDED RELEASE TRANSDERMAL
Status: DISCONTINUED | OUTPATIENT
Start: 2018-01-05 | End: 2018-01-06 | Stop reason: HOSPADM

## 2018-01-05 RX ORDER — METOCLOPRAMIDE HYDROCHLORIDE 5 MG/ML
10 INJECTION INTRAMUSCULAR; INTRAVENOUS ONCE
Status: COMPLETED | OUTPATIENT
Start: 2018-01-05 | End: 2018-01-05

## 2018-01-05 RX ORDER — DEXAMETHASONE SODIUM PHOSPHATE 4 MG/ML
10 INJECTION, SOLUTION INTRA-ARTICULAR; INTRALESIONAL; INTRAMUSCULAR; INTRAVENOUS; SOFT TISSUE ONCE
Status: COMPLETED | OUTPATIENT
Start: 2018-01-05 | End: 2018-01-05

## 2018-01-05 RX ORDER — SODIUM CHLORIDE, SODIUM LACTATE, POTASSIUM CHLORIDE, CALCIUM CHLORIDE 600; 310; 30; 20 MG/100ML; MG/100ML; MG/100ML; MG/100ML
INJECTION, SOLUTION INTRAVENOUS CONTINUOUS
Status: DISCONTINUED | OUTPATIENT
Start: 2018-01-05 | End: 2018-01-06 | Stop reason: HOSPADM

## 2018-01-05 RX ORDER — SODIUM CHLORIDE 0.9 % (FLUSH) 0.9 %
10 SYRINGE (ML) INJECTION EVERY 12 HOURS SCHEDULED
Status: DISCONTINUED | OUTPATIENT
Start: 2018-01-05 | End: 2018-01-06 | Stop reason: HOSPADM

## 2018-01-05 RX ORDER — MIDAZOLAM HYDROCHLORIDE 1 MG/ML
2 INJECTION INTRAMUSCULAR; INTRAVENOUS ONCE
Status: COMPLETED | OUTPATIENT
Start: 2018-01-05 | End: 2018-01-05

## 2018-01-05 RX ORDER — HYDROCODONE BITARTRATE AND ACETAMINOPHEN 5; 325 MG/1; MG/1
1-2 TABLET ORAL EVERY 6 HOURS PRN
Qty: 40 TABLET | Refills: 0 | Status: SHIPPED | OUTPATIENT
Start: 2018-01-05 | End: 2018-01-12

## 2018-01-05 RX ORDER — MEPERIDINE HYDROCHLORIDE 25 MG/ML
12.5 INJECTION INTRAMUSCULAR; INTRAVENOUS; SUBCUTANEOUS EVERY 5 MIN PRN
Status: DISCONTINUED | OUTPATIENT
Start: 2018-01-05 | End: 2018-01-06 | Stop reason: HOSPADM

## 2018-01-05 RX ORDER — FENTANYL CITRATE 50 UG/ML
50 INJECTION, SOLUTION INTRAMUSCULAR; INTRAVENOUS EVERY 5 MIN PRN
Status: DISCONTINUED | OUTPATIENT
Start: 2018-01-05 | End: 2018-01-06 | Stop reason: HOSPADM

## 2018-01-05 RX ORDER — ROPIVACAINE HYDROCHLORIDE 5 MG/ML
30 INJECTION, SOLUTION EPIDURAL; INFILTRATION; PERINEURAL ONCE
Status: DISCONTINUED | OUTPATIENT
Start: 2018-01-05 | End: 2018-01-06 | Stop reason: HOSPADM

## 2018-01-05 RX ORDER — MIDAZOLAM HYDROCHLORIDE 1 MG/ML
2 INJECTION INTRAMUSCULAR; INTRAVENOUS
Status: ACTIVE | OUTPATIENT
Start: 2018-01-05 | End: 2018-01-05

## 2018-01-05 RX ORDER — PROMETHAZINE HYDROCHLORIDE 25 MG/ML
6.25 INJECTION, SOLUTION INTRAMUSCULAR; INTRAVENOUS EVERY 10 MIN PRN
Status: DISCONTINUED | OUTPATIENT
Start: 2018-01-05 | End: 2018-01-06 | Stop reason: HOSPADM

## 2018-01-05 RX ORDER — ONDANSETRON 4 MG/1
4 TABLET, ORALLY DISINTEGRATING ORAL EVERY 8 HOURS PRN
Qty: 30 TABLET | Refills: 0 | Status: SHIPPED | OUTPATIENT
Start: 2018-01-05 | End: 2018-02-23

## 2018-01-05 RX ORDER — BUPIVACAINE HYDROCHLORIDE 5 MG/ML
30 INJECTION, SOLUTION EPIDURAL; INTRACAUDAL ONCE
Status: DISCONTINUED | OUTPATIENT
Start: 2018-01-05 | End: 2018-01-06 | Stop reason: HOSPADM

## 2018-01-05 RX ORDER — ONDANSETRON 4 MG/1
4 TABLET, ORALLY DISINTEGRATING ORAL EVERY 8 HOURS PRN
Qty: 30 TABLET | Refills: 0 | Status: SHIPPED | OUTPATIENT
Start: 2018-01-05 | End: 2018-01-05

## 2018-01-05 RX ORDER — ROPIVACAINE HYDROCHLORIDE 5 MG/ML
INJECTION, SOLUTION EPIDURAL; INFILTRATION; PERINEURAL
Status: DISPENSED
Start: 2018-01-05 | End: 2018-01-05

## 2018-01-05 RX ORDER — LABETALOL HYDROCHLORIDE 5 MG/ML
5 INJECTION, SOLUTION INTRAVENOUS EVERY 10 MIN PRN
Status: DISCONTINUED | OUTPATIENT
Start: 2018-01-05 | End: 2018-01-06 | Stop reason: HOSPADM

## 2018-01-05 RX ORDER — ONDANSETRON 2 MG/ML
4 INJECTION INTRAMUSCULAR; INTRAVENOUS ONCE
Status: COMPLETED | OUTPATIENT
Start: 2018-01-05 | End: 2018-01-05

## 2018-01-05 RX ORDER — GLYCOPYRROLATE 0.2 MG/ML
0.1 INJECTION INTRAMUSCULAR; INTRAVENOUS ONCE
Status: COMPLETED | OUTPATIENT
Start: 2018-01-05 | End: 2018-01-05

## 2018-01-05 RX ORDER — MORPHINE SULFATE 2 MG/ML
2 INJECTION, SOLUTION INTRAMUSCULAR; INTRAVENOUS EVERY 5 MIN PRN
Status: DISCONTINUED | OUTPATIENT
Start: 2018-01-05 | End: 2018-01-06 | Stop reason: HOSPADM

## 2018-01-05 RX ORDER — OXYCODONE HYDROCHLORIDE AND ACETAMINOPHEN 5; 325 MG/1; MG/1
1 TABLET ORAL EVERY 6 HOURS PRN
Qty: 40 TABLET | Refills: 0 | Status: SHIPPED | OUTPATIENT
Start: 2018-01-05 | End: 2018-01-05 | Stop reason: HOSPADM

## 2018-01-05 RX ORDER — ONDANSETRON 2 MG/ML
4 INJECTION INTRAMUSCULAR; INTRAVENOUS
Status: COMPLETED | OUTPATIENT
Start: 2018-01-05 | End: 2018-01-05

## 2018-01-05 RX ORDER — FENTANYL CITRATE 50 UG/ML
100 INJECTION, SOLUTION INTRAMUSCULAR; INTRAVENOUS ONCE
Status: DISCONTINUED | OUTPATIENT
Start: 2018-01-05 | End: 2018-01-06 | Stop reason: HOSPADM

## 2018-01-05 RX ADMIN — FENTANYL CITRATE 100 MCG: 50 INJECTION, SOLUTION INTRAMUSCULAR; INTRAVENOUS at 08:00

## 2018-01-05 RX ADMIN — ONDANSETRON 4 MG: 2 INJECTION INTRAMUSCULAR; INTRAVENOUS at 12:26

## 2018-01-05 RX ADMIN — PROMETHAZINE HYDROCHLORIDE 6.25 MG: 25 INJECTION, SOLUTION INTRAMUSCULAR; INTRAVENOUS at 12:40

## 2018-01-05 RX ADMIN — ONDANSETRON 4 MG: 2 INJECTION INTRAMUSCULAR; INTRAVENOUS at 07:56

## 2018-01-05 RX ADMIN — SODIUM CHLORIDE, SODIUM LACTATE, POTASSIUM CHLORIDE, CALCIUM CHLORIDE: 600; 310; 30; 20 INJECTION, SOLUTION INTRAVENOUS at 07:39

## 2018-01-05 RX ADMIN — METOCLOPRAMIDE HYDROCHLORIDE 10 MG: 5 INJECTION INTRAMUSCULAR; INTRAVENOUS at 08:21

## 2018-01-05 RX ADMIN — DEXAMETHASONE SODIUM PHOSPHATE 10 MG: 4 INJECTION, SOLUTION INTRA-ARTICULAR; INTRALESIONAL; INTRAMUSCULAR; INTRAVENOUS; SOFT TISSUE at 08:13

## 2018-01-05 RX ADMIN — GLYCOPYRROLATE 0.1 MG: 0.2 INJECTION INTRAMUSCULAR; INTRAVENOUS at 08:21

## 2018-01-05 RX ADMIN — MIDAZOLAM HYDROCHLORIDE 2 MG: 1 INJECTION INTRAMUSCULAR; INTRAVENOUS at 07:59

## 2018-01-05 ASSESSMENT — PAIN DESCRIPTION - ORIENTATION: ORIENTATION: RIGHT

## 2018-01-05 ASSESSMENT — PAIN DESCRIPTION - LOCATION: LOCATION: SHOULDER

## 2018-01-05 ASSESSMENT — PAIN SCALES - GENERAL
PAINLEVEL_OUTOF10: 0

## 2018-01-05 ASSESSMENT — PAIN DESCRIPTION - PAIN TYPE: TYPE: SURGICAL PAIN

## 2018-01-05 NOTE — FLOWSHEET NOTE
[]Hyper      RESTRICTIONS/PRECAUTIONS:     Exercises/Interventions:     Exercise/Equipment Resistance/Repetitions Comments   Cardio/Warm-up     Bike          Stretching/PROM     Wand ER 10x10\" At 0° abd   Table Slides Flexion 10x10\"    UE [place]     Pulleys     Pendulum      Elbow AROM 20x    Wrist 4 way 20x ea         STRENGTH     Isometrics     Retraction 20x5\"    Shrugs 20x5\"     Red ball x 3'    Weight shift     Flexion     Abduction     External Rotation     Internal Rotation     Biceps     Triceps          PRE's     Flexion     Abduction     External Rotation     Internal Rotation     Shrugs     EXT     Reverse Flys     Serratus     Horizontal Abd with ER     Biceps     Triceps     Retraction          Cable Column/Theraband     External Rotation     Internal Rotation     Shrugs     Lats     Ext     Flex     Scapular Retraction     BIC     TRIC     PNF          Neuromuscular Re-ed     Dynamic Stability                              [place]                    Manual/Modalities       Manual R shoulder flexion and ER stretching, 10'                    Therapeutic Exercise and NMR EXR  [x] (15612) Provided verbal/tactile cueing for activities related to strengthening, flexibility, endurance, ROM  for improvements in scapular, scapulothoracic and UE control with self care, reaching, carrying, lifting, house/yardwork, driving/computer work.    [] (60419) Provided verbal/tactile cueing for activities related to improving balance, coordination, kinesthetic sense, posture, motor skill, proprioception  to assist with  scapular, scapulothoracic and UE control with self care, reaching, carrying, lifting, house/yardwork, driving/computer work.     Therapeutic Activities:    [] (51160 or 22328) Provided verbal/tactile cueing for activities related to improving balance, coordination, kinesthetic sense, posture, motor skill, proprioception and motor activation to allow for proper function of scapular, scapulothoracic and UE

## 2018-01-05 NOTE — ANESTHESIA POST-OP
Anesthesia Post-op Note    Patient: Jairon Hall    Procedure(s) Performed: R shoulder Arthroscopy, extensive debridement, lysis of adhesions     DOS : 1/5/18  Surgeon : See Coleman       Anesthesia type: general      Post-op assessment:  Anesthetic Problems: no   Last Vitals:  height is 5' 1\" (1.549 m) and weight is 225 lb (102.1 kg). Her temporal temperature is 97.7 °F (36.5 °C). Her blood pressure is 169/62 (abnormal) and her pulse is 97. Her respiration is 17 and oxygen saturation is 91%.    Cardiovascular System Stable: yes  Respiratory Function: Airway Patent yes  ETT no  Ventilator no  Level of consciousness: awake, alert and oriented  Post-op pain: adequate analgesia  Hydration Adequate: yes  Nausea/Vomiting:no          Triny Moat  1:12 PM

## 2018-01-05 NOTE — PROGRESS NOTES
Dr Fernandez Jeanine to bedside, time out done. Right intrascalene block done.
Pt reports she is painfree, no nausea, ready for discharge home. Spoke to PT and they are aware pt is leaving [facility] now.
Pt sleeping after receiving phenergan for nausea. Full sensation in right hand, warm 2+raial pulse. Scopolamine patch behind left ear.  to bedside upset pt had not received patch preoperatively as he states he had made staff aware of post op N and V.  Per pt, she cannot take Percocet. Call made to OR 8 for fellow, Dr Jessica Laird to call PACU after current case to issue new pain prescription.
in effect during my hospitalization, the order may or may not be in effect during this procedure. I give my doctor permission to give me blood or blood products. I understand that there are risks with receiving blood such as hepatitis, AIDS, fever, or allergic reaction. I acknowledge that the risks, benefits, and alternatives of this treatment have been explained to me and that no express or implied warranty has been given by the hospital, any blood bank, or any person or entity as to the blood or blood components transfused. At the discretion of my doctor, I agree to allow observers, equipment/product representatives and allow photographing, and/or televising of the procedure, provided my name or identity is maintained confidentially. I agree the hospital may dispose of or use for scientific or educational purposes any tissue, fluid, or body parts which may be removed.     ________________________________Date________Time______ am/pm  ([place])  Patient or Signature of Closest Relative or Legal Guardian    ________________________________Date________Time______am/pm      Page 1 of  1  Witness
my hospitalization, the order may or may not be in effect during this procedure. I give my doctor permission to give me blood or blood products. I understand that there are risks with receiving blood such as hepatitis, AIDS, fever, or allergic reaction. I acknowledge that the risks, benefits, and alternatives of this treatment have been explained to me and that no express or implied warranty has been given by the hospital, any blood bank, or any person or entity as to the blood or blood components transfused. At the discretion of my doctor, I agree to allow observers, equipment/product representatives and allow photographing, and/or televising of the procedure, provided my name or identity is maintained confidentially. I agree the hospital may dispose of or use for scientific or educational purposes any tissue, fluid, or body parts which may be removed.     ________________________________Date________Time______ am/pm  ([place] [facility])  Patient or Signature of Closest Relative or Legal Guardian    ________________________________Date________Time______am/pm      Page 1 of  1  Witness

## 2018-01-05 NOTE — BRIEF OP NOTE
Brief Postoperative Note    Xenia Fleming  YOB: 1965  [id number]    Pre-operative Diagnosis: R shoulder adhesive capsulitis     Post-operative Diagnosis: Same    Procedure: R shoulder Arthroscopy, extensive debridement, lysis of adhesions    Anesthesia: General and Moderate Sedation    Surgeons/Assistants:   ROSELIA Guerrero    Estimated Blood Loss: less than 50     Complications: None    Specimens: Was Not Obtained    Findings: See Dictation    Electronically signed by Melonie Da Silva MD on 1/5/2018 at 11:33 AM

## 2018-01-05 NOTE — PLAN OF CARE
surgery was this morning    Gait: (include devices/WB status): decreased trunk rotation and no reciprocal arm swing    Orthopedic Special Tests: see above                       [x] Patient history, allergies, meds reviewed. Medical chart reviewed. See intake form. Review Of Systems (ROS):  [x]Performed Review of systems (Integumentary, CardioPulmonary, Neurological) by intake and observation. Intake form has been scanned into medical record. Patient has been instructed to contact their primary care physician regarding ROS issues if not already being addressed at this time.       Co-morbidities/Complexities (which will affect course of rehabilitation):    []None           Arthritic conditions   []Rheumatoid arthritis (M05.9)  [x]Osteoarthritis (M19.91)   Cardiovascular conditions   []Hypertension (I10)  []Hyperlipidemia (E78.5)  []Angina pectoris (I20)  []Atherosclerosis (I70)   Musculoskeletal conditions   []Disc pathology   []Congenital spine pathologies   []Prior surgical intervention  []Osteoporosis (M81.8)  []Osteopenia (M85.8)   Endocrine conditions   []Hypothyroid (E03.9)  []Hyperthyroid Gastrointestinal conditions   []Constipation (S48.95)   Metabolic conditions   []Morbid obesity (E66.01)  [x]Diabetes type 1(E10.65) or 2 (E11.65)   []Neuropathy (G60.9)     Pulmonary conditions   []Asthma (J45)  []Coughing   []COPD (J44.9)   Psychological Disorders  []Anxiety (F41.9)  []Depression (F32.9)   []Other:   [x]Other:  Hx of adhesive capsulitis on R        Barriers to/and or personal factors that will affect rehab potential:              []Age  []Sex              [x]Motivation/Lack of Motivation: Patient demonstrates high motivation                       []Co-Morbidities              []Cognitive Function, education/learning barriers              []Environmental, home barriers              []profession/work barriers  [x]past PT/medical experience: Patient has had prior skilled PT experience and demonstrates good standardized patient assessment instrument and/or measurable assessment of functional outcome. [x] EVAL (LOW) 57076 (typically 20 minutes face-to-face)  [] EVAL (MOD) 82131 (typically 30 minutes face-to-face)  [] EVAL (HIGH) 92053 (typically 45 minutes face-to-face)  [] RE-EVAL     PLAN:  Frequency/Duration:  2-3 days per week for 4-6 Weeks:  INTERVENTIONS:  [x] Therapeutic exercise including: strength training, ROM, for Upper extremity and core   [x]  NMR activation and proprioception for UE, scap and Core   [x] Manual therapy as indicated for shoulder, scapula and spine to include: Dry Needling/IASTM, STM, PROM, Gr I-IV mobilizations, manipulation. [x] Modalities as needed that may include: thermal agents, E-stim, Biofeedback, US, iontophoresis as indicated  [x] Patient education on joint protection, postural re-education, activity modification, progression of HEP. HEP instruction: Patient returned proper demonstration of HEP. Issued patient written program clearly stating sets/reps/sessions per day. Written program was scanned into media section of medical record. (see scanned forms)    GOALS:  Patient stated goal: Able to sleep through night without pain.     Therapist goals for Patient:   Short Term Goals: To be achieved in: 2 weeks  1. Independent in HEP and progression per patient tolerance, in order to prevent re-injury. 2. Patient will have a decrease in pain to facilitate improvement in movement, function, and ADLs as indicated by Functional Deficits.     Long Term Goals: To be achieved in: 6 weeks  1. Disability index score of 10% or less for the UEFI to assist with reaching prior level of function. 2. Patient will demonstrate increased R shoulder flexion, abduction, ER, and IR AROM to >155°, >155°, T1, and T12 respectively, to allow for proper joint functioning as indicated by patients Functional Deficits.    3. Patient will demonstrate an increase in R shoulder Strength in all planes by a half grade to allow for proper functional mobility as indicated by patients Functional Deficits. 4. Patient will return to all functional activities, independently, without increased symptoms or restriction. 5. Patient will be able to sleep >6 hours a night without disruption due to pain so that she may return to PLOF for all ADLs.            Electronically signed by:       [place]: [id number]     Noni Mariano PT

## 2018-01-06 NOTE — OP NOTE
findings. BRIEF HISTORY AND PRESENTING ILLNESS:  The patient is an unfortunate  72-year-old woman with longstanding history of right shoulder pain and  stiffness. She had difficulty with rotation and pain with elevation. The  MRI showed some chondral changes and x-rays are unremarkable. We thought  she had a case of frozen shoulder and biceps tendinopathy. We recommended  a capsular release and debridement as well as biceps tenotomy. She  understood that concurrent lesions would be addressed as encountered. She  understood the importance of immediate physical therapy. She understood  risks such as bleeding, infection, anesthetic risk, injury to nerve and  blood vessels, stiffness, weakness, incomplete pain relief, need for  further surgery. She understood all of this and wished to proceed. She  gave written informed consent. She was scheduled on an elective basis  after appropriate preoperative medical clearance. OPERATIVE PROCEDURE:  On the date of the procedure, the patient was brought  back to the operating room, placed on the operating room table. General  anesthesia was established. Preoperative antibiotics and interscalene  block had been given in the holding area. Under anesthesia, exam was  carried out with findings as above. The patient was positioned in standard  beach chair position in a semi-sitting position. All pressure points were  padded. The patient's right shoulder and arm were prepped and draped for  standard arthroscopic shoulder surgery. We used a [employer] arm poole  to hold the arm in position. We began diagnostic arthroscopy. The  arthroscope was introduced into the anterior glenohumeral joint through a  standard posterior portal.  Systematic diagnostic arthroscopy was ensued  with findings as noted above. We established an anterior portal over the  rotator interval.  We inserted our arthroscopic shaver across a metal  canula.   This was used to debrided some rotator cuff fraying and also some  labral fraying. We then opened up the rotator interval with cautery down  to base of the coracoid. There was extensive scarring. We did limited  synovectomy releasing some synovial adhesions. We also debrided some  chondral flap tears using a shaver. We could see the biceps disease and  damage. We elected to proceed with tenotomy. This was used with cautery  right at the supraglenoid tubercle. We preserved the rotator cuff. We  then used a switching-stick technique to bring the arthroscope anteriorly  to view posterior structures. We placed the arthroscope through the  cannula and the rotator interval.  We placed 7 mm cannula over a switching  stick and dilated posteriorly. Through that, I used a shaver to perform  additional synovectomy with full release of the posterior capsule, and then  used a hook cautery probe to release the posterior-inferior and inferior  capsule staying just lateral to the labrum. We protected the axillary  nerve. We were careful not to go too deep. We then removed some  additional synovitis. Inspected posterior cuff. There was really no  rotator cuff tear. We went back with the arthroscope posteriorly and used  a shaver to remove some synovitis more anteroinferiorly. We preserved the  anterior capsule, which was centered here. We debrided additional chondral  flap tear as we encountered these. At this point, after thorough  synovectomy, capsular release, and debridement, I felt there is really  nothing more to do in the subacromial space. The biceps had already been  tenotomized. We then redirected the arthroscope in the same posterior  portal above the rotator cuff and into the subacromial space. We  established a lateral portal under direct visualization and dilated the  portal with arthroscopic shaver and performed our initial bursectomy and  complete bursectomy using [employer] radiofrequency thermal ablator.   This  was used to

## 2018-01-08 ENCOUNTER — TELEPHONE (OUTPATIENT)
Dept: ORTHOPEDIC SURGERY | Age: 53
End: 2018-01-08

## 2018-01-08 ENCOUNTER — TREATMENT (OUTPATIENT)
Dept: PHYSICAL THERAPY | Age: 53
End: 2018-01-08

## 2018-01-08 DIAGNOSIS — M25.511 PAIN IN JOINT OF RIGHT SHOULDER: ICD-10-CM

## 2018-01-08 DIAGNOSIS — M75.01 ADHESIVE CAPSULITIS OF RIGHT SHOULDER: Primary | ICD-10-CM

## 2018-01-08 PROCEDURE — 97140 MANUAL THERAPY 1/> REGIONS: CPT | Performed by: PHYSICAL THERAPIST

## 2018-01-08 PROCEDURE — 97112 NEUROMUSCULAR REEDUCATION: CPT | Performed by: PHYSICAL THERAPIST

## 2018-01-08 PROCEDURE — 97110 THERAPEUTIC EXERCISES: CPT | Performed by: PHYSICAL THERAPIST

## 2018-01-08 NOTE — FLOWSHEET NOTE
bilaterally              [x]Other: deferred secondary to surgery     Joint mobility: deferred secondary to surgery              []Normal               []Hypo              []Hyper      RESTRICTIONS/PRECAUTIONS:     Exercises/Interventions:     Exercise/Equipment Resistance/Repetitions Comments   Cardio/Warm-up     Bike          Stretching/PROM     Wand ER 10x10\" At 0° abd   Table Slides Flexion 15x10\"    UE [place] 10x10\" 3 planes   Pulleys 3'    Pendulum      Elbow AROM 20x    Wrist 4 way 20x ea         STRENGTH     Isometrics     Retraction 20x5\"    Shrugs 20x5\"     Red ball x 3'    Weight shift     Flexion     Abduction     External Rotation     Internal Rotation     Biceps     Triceps          PRE's     Flexion     Abduction     External Rotation     Internal Rotation     Shrugs     EXT     Reverse Flys     Serratus     Horizontal Abd with ER     Biceps     Triceps     Retraction          Cable Column/Theraband     External Rotation     Internal Rotation     Shrugs     Lats     Ext     Flex     Scapular Retraction     BIC     TRIC     PNF          Neuromuscular Re-ed     Dynamic Stability                              Plyoback                    Manual/Modalities       Manual R shoulder flexion and ER stretching, 10'                    Therapeutic Exercise and NMR EXR  [x] (50988) Provided verbal/tactile cueing for activities related to strengthening, flexibility, endurance, ROM  for improvements in scapular, scapulothoracic and UE control with self care, reaching, carrying, lifting, house/yardwork, driving/computer work. [x] (21601) Provided verbal/tactile cueing for activities related to improving balance, coordination, kinesthetic sense, posture, motor skill, proprioception  to assist with  scapular, scapulothoracic and UE control with self care, reaching, carrying, lifting, house/yardwork, driving/computer work.     Therapeutic Activities:    [] (78310 or 31497) Provided verbal/tactile cueing for and progression per patient tolerance, in order to prevent re-injury. 2. Patient will have a decrease in pain to facilitate improvement in movement, function, and ADLs as indicated by Functional Deficits.     Long Term Goals: To be achieved in: 6 weeks  1. Disability index score of 10% or less for the UEFI to assist with reaching prior level of function. 2. Patient will demonstrate increased R shoulder flexion, abduction, ER, and IR AROM to >155°, >155°, T1, and T12 respectively, to allow for proper joint functioning as indicated by patients Functional Deficits. 3. Patient will demonstrate an increase in R shoulder Strength in all planes by a half grade to allow for proper functional mobility as indicated by patients Functional Deficits. 4. Patient will return to all functional activities, independently, without increased symptoms or restriction. 5. Patient will be able to sleep >6 hours a night without disruption due to pain so that she may return to PLOF for all ADLs. Progression Towards Functional goals:  [] Patient is progressing as expected towards functional goals listed. [] Progression is slowed due to complexities listed. [] Progression has been slowed due to co-morbidities. [x] Plan just implemented, too soon to assess goals progression  [] Other:     ASSESSMENT:  Patient demonstrates continued improvements in R shoulder PROM in all planes, see measurements above. She reports that she is using the CPM as instructed and progressing it daily. She tolerated ther ex progressions well today, and was instructed to continue with HEP in combination with use of CPM daily. Plan to progress mobility activities per patient tolerance and postop protocol.     Treatment/Activity Tolerance:  [x] Patient tolerated treatment well [] Patient limited by fatique  [] Patient limited by pain  [] Patient limited by other medical complications  [] Other:     Prognosis: [x] Good [] Fair  [] Poor    Patient Requires

## 2018-01-10 ENCOUNTER — TREATMENT (OUTPATIENT)
Dept: PHYSICAL THERAPY | Age: 53
End: 2018-01-10

## 2018-01-10 DIAGNOSIS — M25.511 PAIN IN JOINT OF RIGHT SHOULDER: ICD-10-CM

## 2018-01-10 DIAGNOSIS — M75.01 ADHESIVE CAPSULITIS OF RIGHT SHOULDER: Primary | ICD-10-CM

## 2018-01-10 PROCEDURE — 97110 THERAPEUTIC EXERCISES: CPT | Performed by: PHYSICAL THERAPIST

## 2018-01-10 PROCEDURE — 97140 MANUAL THERAPY 1/> REGIONS: CPT | Performed by: PHYSICAL THERAPIST

## 2018-01-10 PROCEDURE — 97112 NEUROMUSCULAR REEDUCATION: CPT | Performed by: PHYSICAL THERAPIST

## 2018-01-10 NOTE — FLOWSHEET NOTE
[facility]   Phone: [phone]    Fax: [phone]        Physical Therapy Daily Treatment Note  Date:  1/10/2018    Patient Name:  Gunnar Stone    :  1965  MRN: [id number]  Medical/Treatment Diagnosis Information:  · Diagnosis: R shoulder adhesive capsulitis     DOS: 33  Insurance/Certification information:  PT Insurance Information:   Physician Information:  Referring Practitioner: Dr. Logan Hurtado of care signed (Y/N): yes 18    Date of Patient follow up with Physician: 18    G-Code (if applicable):      Date G-Code Applied:  18  PT G-Codes  Functional Assessment Tool Used: UEFI  Score: 59/80 = 73.75%; 26.25% functional limitation  Functional Limitation: Carrying, moving and handling objects  Carrying, Moving and Handling Objects Current Status (): At least 20 percent but less than 40 percent impaired, limited or restricted  Carrying, Moving and Handling Objects Goal Status (): At least 1 percent but less than 20 percent impaired, limited or restricted    Progress Note: []  Yes  [x]  No  Next due by: Visit #10      Latex Allergy:  [x]NO      []YES  Preferred Language for Healthcare:   [x]English       []other:    Visit # Insurance Allowable   3 ???     Pain level:  2/10  1/10/18     SUBJECTIVE:  Patient reports that she has minimal discomfort in the R shoulder. She notes that she did slip on the ice Monday when entering her house. She reports that she did not fall, but did a quick, \"jolting\" motion and has had slight pain in the anterior/lateral R upper arm rated a 2/10.     <1 week postop    OBJECTIVE: See eval                ROM PROM AROM  Comment:  18     L R L R     Flexion   151° 142° 92     Abduction   147 152°       ER   60° C6       IR   57 L4       Other             Other                    Strength: deferred secondary to surgery L R Comment:  18         Special Tests Results/Comment: deferred secondary to surgery: 1/5/18      Reflexes/Sensation:               []Dermatomes/Myotomes intact               []Reflexes equal and normal bilaterally              [x]Other: deferred secondary to surgery     Joint mobility: deferred secondary to surgery              []Normal               []Hypo              []Hyper      RESTRICTIONS/PRECAUTIONS:     Exercises/Interventions:     Exercise/Equipment Resistance/Repetitions Comments   Cardio/Warm-up     Bike          Stretching/PROM     Wand ER 10x10\" At 0° abd   Table Slides Flexion 15x10\"    UE [place] 10x10\" 3 planes   Pulleys 4'    Pendulum      Elbow AROM 20x    Wrist 4 way 20x ea    BB adduction 10x10\"    biceps 10x10\" W/ cane        STRENGTH     Isometrics     Retraction 20x5\"    Shrugs 20x5\"     Red ball x 3'    Weight shift     Flexion     Abduction     External Rotation     Internal Rotation     Biceps     Triceps          PRE's     Flexion     Abduction     External Rotation     Internal Rotation     Shrugs     EXT     Reverse Flys     Serratus     Horizontal Abd with ER     Biceps     Triceps     Retraction          Cable Column/Theraband     External Rotation     Internal Rotation     Shrugs     Lats     Ext     Flex     Scapular Retraction     BIC     TRIC     PNF          Neuromuscular Re-ed     Dynamic Stability                              Plyoback                    Manual/Modalities       Manual R shoulder flexion and ER stretching, STM to R upper arm/ant. shoulder  10'     CP to R shoulder, 10'               Therapeutic Exercise and NMR EXR  [x] (32329) Provided verbal/tactile cueing for activities related to strengthening, flexibility, endurance, ROM  for improvements in scapular, scapulothoracic and UE control with self care, reaching, carrying, lifting, house/yardwork, driving/computer work.     [x] (13816) Provided verbal/tactile cueing for activities related to improving balance, coordination, kinesthetic sense, posture, motor skill, proprioception  to ES(attended) (75461)      [] ES (un) (92945):     GOALS:  Patient stated goal: Able to sleep through night without pain.     Therapist goals for Patient:   Short Term Goals: To be achieved in: 2 weeks  1. Independent in HEP and progression per patient tolerance, in order to prevent re-injury. 2. Patient will have a decrease in pain to facilitate improvement in movement, function, and ADLs as indicated by Functional Deficits.     Long Term Goals: To be achieved in: 6 weeks  1. Disability index score of 10% or less for the UEFI to assist with reaching prior level of function. 2. Patient will demonstrate increased R shoulder flexion, abduction, ER, and IR AROM to >155°, >155°, T1, and T12 respectively, to allow for proper joint functioning as indicated by patients Functional Deficits. 3. Patient will demonstrate an increase in R shoulder Strength in all planes by a half grade to allow for proper functional mobility as indicated by patients Functional Deficits. 4. Patient will return to all functional activities, independently, without increased symptoms or restriction. 5. Patient will be able to sleep >6 hours a night without disruption due to pain so that she may return to PLOF for all ADLs. Progression Towards Functional goals:  [] Patient is progressing as expected towards functional goals listed. [] Progression is slowed due to complexities listed. [] Progression has been slowed due to co-morbidities. [x] Plan just implemented, too soon to assess goals progression  [] Other:     ASSESSMENT:  PT assessed area of increased soreness in R upper arm. Patient is point tender near anterior deltoid, and reports greatest discomfort with active abduction motion. Patient able to complete program with reports of minimal discomfort in the R shoulder and upper arm. She continues to demonstrate significant limitations in R shoulder IR and behind the back adduction.  Plan to progress activity per patient

## 2018-01-11 ENCOUNTER — OFFICE VISIT (OUTPATIENT)
Dept: ORTHOPEDIC SURGERY | Age: 53
End: 2018-01-11

## 2018-01-11 VITALS
HEIGHT: 61 IN | HEART RATE: 89 BPM | DIASTOLIC BLOOD PRESSURE: 77 MMHG | WEIGHT: 225.09 LBS | SYSTOLIC BLOOD PRESSURE: 157 MMHG | BODY MASS INDEX: 42.5 KG/M2

## 2018-01-11 DIAGNOSIS — Z98.890 S/P ARTHROSCOPY OF SHOULDER: Primary | ICD-10-CM

## 2018-01-11 DIAGNOSIS — M75.01 ADHESIVE CAPSULITIS OF RIGHT SHOULDER: ICD-10-CM

## 2018-01-11 PROCEDURE — 99024 POSTOP FOLLOW-UP VISIT: CPT | Performed by: ORTHOPAEDIC SURGERY

## 2018-01-11 NOTE — PROGRESS NOTES
Referral Priority:   Routine     Referral Type:   Eval and Treat     Referral Reason:   Specialty Services Required     Requested Specialty:   Physical Therapy     Number of Visits Requested:   1       Treatment Plan: We recommend that Chance Rivera continue in physical therapy. All questions were answered to patient's satisfaction and was encouraged to call with any further questions or concerns. Ms. Pasquale Mtz should follow-up in 2-3 weeks and/or as needed. Rosy Harley is in agreement with this plan. Francheska Perales PA-C  1/11/2018     During this examination, IFrancheska PA-C, functioned as a scribe for Dr. Chalo Irene. The history taking and physical examination were performed by Dr. Chalo Irene. All counseling during the appointment was performed between the patient and Dr. Chalo Irene. 1/11/2018 2:50 PM      This dictation was performed with a verbal recognition program (DRAGON) and it was checked for errors. It is possible that there are still dictated errors within this office note. If so, please bring any errors to my attention for an addendum. All efforts were made to ensure that this office note is accurate.  ______________  I, Dr. Laura Clarke, personally performed the services described in this documentation as described by Francheska Perales PA-C in my presence, and it is both accurate and complete. Maria Esther Irene MD, PhD  1/11/2018

## 2018-01-12 ENCOUNTER — TREATMENT (OUTPATIENT)
Dept: PHYSICAL THERAPY | Age: 53
End: 2018-01-12

## 2018-01-12 DIAGNOSIS — M75.01 ADHESIVE CAPSULITIS OF RIGHT SHOULDER: Primary | ICD-10-CM

## 2018-01-12 DIAGNOSIS — M25.511 PAIN IN JOINT OF RIGHT SHOULDER: ICD-10-CM

## 2018-01-12 PROCEDURE — 97140 MANUAL THERAPY 1/> REGIONS: CPT | Performed by: PHYSICAL THERAPIST

## 2018-01-12 PROCEDURE — 97110 THERAPEUTIC EXERCISES: CPT | Performed by: PHYSICAL THERAPIST

## 2018-01-12 PROCEDURE — 97112 NEUROMUSCULAR REEDUCATION: CPT | Performed by: PHYSICAL THERAPIST

## 2018-01-12 NOTE — FLOWSHEET NOTE
[facility]   Phone: [phone]    Fax: [phone]        Physical Therapy Daily Treatment Note  Date:  2018    Patient Name:  Xenia Fleming    :  1965  MRN: [id number]  Medical/Treatment Diagnosis Information:  · Diagnosis: R shoulder adhesive capsulitis     DOS: 99  Insurance/Certification information:  PT Insurance Information:   Physician Information:  Referring Practitioner: Dr. Sosa Has of care signed (Y/N): yes 18    Date of Patient follow up with Physician: 18    G-Code (if applicable):      Date G-Code Applied:  18  PT G-Codes  Functional Assessment Tool Used: UEFI  Score: 59/80 = 73.75%; 26.25% functional limitation  Functional Limitation: Carrying, moving and handling objects  Carrying, Moving and Handling Objects Current Status (): At least 20 percent but less than 40 percent impaired, limited or restricted  Carrying, Moving and Handling Objects Goal Status (): At least 1 percent but less than 20 percent impaired, limited or restricted    Progress Note: []  Yes  [x]  No  Next due by: Visit #10      Latex Allergy:  [x]NO      []YES  Preferred Language for Healthcare:   [x]English       []other:    Visit # Insurance Allowable   4 ???     Pain level:  2/10  1/12/18     SUBJECTIVE:  Patient reports that she had postop follow up appointment with MD yesterday, and he seemed very pleased with her progress postoperatively. She notes that he told her that her fatigue level is normal following surgery, and instructed her to continue stretching in PT and at home.     >1 week postop    OBJECTIVE: See eval                ROM PROM AROM  Comment:  18     L R L R     Flexion   151° 142° 92     Abduction   147 152°       ER   60° C6       IR   57 L4       Other             Other                    Strength: deferred secondary to surgery L R Comment:  18         Special Tests Results/Comment: deferred secondary to surgery: 1/5/18      Reflexes/Sensation:               []Dermatomes/Myotomes intact               []Reflexes equal and normal bilaterally              [x]Other: deferred secondary to surgery     Joint mobility: deferred secondary to surgery              []Normal               []Hypo              []Hyper      RESTRICTIONS/PRECAUTIONS:     Exercises/Interventions:     Exercise/Equipment Resistance/Repetitions Comments   Cardio/Warm-up     Bike          Stretching/PROM     Wand ER 10x10\" At 0° abd   Table Slides Flexion 15x10\"    UE [place] 10x10\" 3 planes   Pulleys 5'    Pendulum      Elbow AROM 20x    Wrist 4 way 20x ea    BB adduction 10x10\"    biceps 10x10\" W/ cane        STRENGTH     Isometrics     Retraction 20x5\"    Shrugs 20x5\"     Red ball x 3'    Weight shift     Flexion     Abduction     External Rotation     Internal Rotation     Biceps     Triceps          PRE's     Flexion     Abduction     External Rotation     Internal Rotation     Shrugs     EXT     Reverse Flys     Serratus     Horizontal Abd with ER     Biceps     Triceps     Retraction          Cable Column/Theraband     External Rotation     Internal Rotation     Shrugs     Lats     Ext     Flex     Scapular Retraction     BIC     TRIC     PNF          Neuromuscular Re-ed     Dynamic Stability                              [employer]ack                    Manual/Modalities       Manual R shoulder flexion and ER stretching,   10'                    Therapeutic Exercise and NMR EXR  [x] (67490) Provided verbal/tactile cueing for activities related to strengthening, flexibility, endurance, ROM  for improvements in scapular, scapulothoracic and UE control with self care, reaching, carrying, lifting, house/yardwork, driving/computer work.     [x] (14428) Provided verbal/tactile cueing for activities related to improving balance, coordination, kinesthetic sense, posture, motor skill, proprioception  to assist with  scapular, scapulothoracic and UE control

## 2018-01-15 ENCOUNTER — TREATMENT (OUTPATIENT)
Dept: PHYSICAL THERAPY | Age: 53
End: 2018-01-15

## 2018-01-15 DIAGNOSIS — M75.01 ADHESIVE CAPSULITIS OF RIGHT SHOULDER: Primary | ICD-10-CM

## 2018-01-15 DIAGNOSIS — M25.511 PAIN IN JOINT OF RIGHT SHOULDER: ICD-10-CM

## 2018-01-17 ENCOUNTER — TREATMENT (OUTPATIENT)
Dept: PHYSICAL THERAPY | Age: 53
End: 2018-01-17

## 2018-01-17 DIAGNOSIS — M25.511 PAIN IN JOINT OF RIGHT SHOULDER: ICD-10-CM

## 2018-01-17 DIAGNOSIS — M75.01 ADHESIVE CAPSULITIS OF RIGHT SHOULDER: Primary | ICD-10-CM

## 2018-01-17 PROCEDURE — 97140 MANUAL THERAPY 1/> REGIONS: CPT | Performed by: PHYSICAL THERAPIST

## 2018-01-17 PROCEDURE — 97112 NEUROMUSCULAR REEDUCATION: CPT | Performed by: PHYSICAL THERAPIST

## 2018-01-17 PROCEDURE — 97110 THERAPEUTIC EXERCISES: CPT | Performed by: PHYSICAL THERAPIST

## 2018-01-17 NOTE — FLOWSHEET NOTE
1/5/18      Reflexes/Sensation:               []Dermatomes/Myotomes intact               []Reflexes equal and normal bilaterally              [x]Other: deferred secondary to surgery     Joint mobility: deferred secondary to surgery              []Normal               []Hypo              []Hyper      RESTRICTIONS/PRECAUTIONS:     Exercises/Interventions:     Exercise/Equipment Resistance/Repetitions Comments   Cardio/Warm-up     Bike          Stretching/PROM     Wand ER 10x10\" At 0° abd   Table Slides Flexion 15x10\"    UE Howard 10x10\" 3 planes   Pulleys 5'    Pendulum      Elbow AROM 20x    Wrist 4 way 20x ea    BB adduction 10x10\"    biceps 10x10\" W/ cane   Wall slide 10x10\"    Doorway ER 10x10\"    Genie stretch 10x10\"              STRENGTH     Isometrics     Retraction 20x5\"    Shrugs 20x5\"     Red ball x 3'    Weight shift     Flexion     Abduction     External Rotation     Internal Rotation     Biceps     Triceps          PRE's     Flexion     Abduction     External Rotation     Internal Rotation     Shrugs     EXT     Reverse Flys     Serratus     Horizontal Abd with ER     Biceps     Triceps     Retraction          Cable Column/Theraband     External Rotation     Internal Rotation     Shrugs     Lats     Ext     Flex     Scapular Retraction     BIC     TRIC     PNF          Neuromuscular Re-ed     Dynamic Stability                              Plyoback                    Manual/Modalities       Manual R shoulder flexion and ER stretching,   10'                    Therapeutic Exercise and NMR EXR  [x] (50242) Provided verbal/tactile cueing for activities related to strengthening, flexibility, endurance, ROM  for improvements in scapular, scapulothoracic and UE control with self care, reaching, carrying, lifting, house/yardwork, driving/computer work.     [x] (11744) Provided verbal/tactile cueing for activities related to improving balance, coordination, kinesthetic sense, posture, motor skill, proprioception  to assist with  scapular, scapulothoracic and UE control with self care, reaching, carrying, lifting, house/yardwork, driving/computer work. Therapeutic Activities:    [] (94108 or 78875) Provided verbal/tactile cueing for activities related to improving balance, coordination, kinesthetic sense, posture, motor skill, proprioception and motor activation to allow for proper function of scapular, scapulothoracic and UE control with self care, carrying, lifting, driving/computer work.      Home Exercise Program:    [x] (35297) Reviewed/Progressed HEP activities related to strengthening, flexibility, endurance, ROM of scapular, scapulothoracic and UE control with self care, reaching, carrying, lifting, house/yardwork, driving/computer work  [] (79017) Reviewed/Progressed HEP activities related to improving balance, coordination, kinesthetic sense, posture, motor skill, proprioception of scapular, scapulothoracic and UE control with self care, reaching, carrying, lifting, house/yardwork, driving/computer work      Manual Treatments:  PROM / STM / Oscillations-Mobs:  G-I, II, III, IV (PA's, Inf., Post.)  [x] (87245) Provided manual therapy to mobilize soft tissue/joints of cervical/CT, scapular GHJ and UE for the purpose of modulating pain, promoting relaxation,  increasing ROM, reducing/eliminating soft tissue swelling/inflammation/restriction, improving soft tissue extensibility and allowing for proper ROM for normal function with self care, reaching, carrying, lifting, house/yardwork, driving/computer work      Charges:  Timed Code Treatment Minutes: 50   Total Treatment Minutes: 65     [] EVAL (LOW) 35805 (typically 20 minutes face-to-face)  [] EVAL (MOD) 77050 (typically 30 minutes face-to-face)  [] EVAL (HIGH) 44629 (typically 45 minutes face-to-face)  [] RE-EVAL   [x] QP(76964) x  1   [] IONTO  [x] NMR (01111) x  1   [] VASO  [x] Manual (00659) x  1    [] Other:  [] TA (68176) x       [] [facility] Traction (50819)  [] ES(attended) (72714)      [] ES (un) (98197):     GOALS:  Patient stated goal: Able to sleep through night without pain.     Therapist goals for Patient:   Short Term Goals: To be achieved in: 2 weeks  1. Independent in HEP and progression per patient tolerance, in order to prevent re-injury. 2. Patient will have a decrease in pain to facilitate improvement in movement, function, and ADLs as indicated by Functional Deficits.     Long Term Goals: To be achieved in: 6 weeks  1. Disability index score of 10% or less for the UEFI to assist with reaching prior level of function. 2. Patient will demonstrate increased R shoulder flexion, abduction, ER, and IR AROM to >155°, >155°, T1, and T12 respectively, to allow for proper joint functioning as indicated by patients Functional Deficits. 3. Patient will demonstrate an increase in R shoulder Strength in all planes by a half grade to allow for proper functional mobility as indicated by patients Functional Deficits. 4. Patient will return to all functional activities, independently, without increased symptoms or restriction. 5. Patient will be able to sleep >6 hours a night without disruption due to pain so that she may return to PLOF for all ADLs. Progression Towards Functional goals:  [] Patient is progressing as expected towards functional goals listed. [] Progression is slowed due to complexities listed. [] Progression has been slowed due to co-morbidities. [x] Plan just implemented, too soon to assess goals progression  [] Other:     ASSESSMENT: Patient demonstrates no losses in R shoulder ROM. She has maintained compliance with HEP and use of CPM. She tolerated introduction of new stretches well today, see bolded items above. Plan to focus on R shoulder IR, as that is the area of greatest limitation for patient's ROM.      Treatment/Activity Tolerance:  [x] Patient tolerated treatment well [] Patient limited by sebastian  [] Patient limited by pain  [] Patient limited by other medical complications  [] Other:     Prognosis: [x] Good [] Fair  [] Poor    Patient Requires Follow-up: [x] Yes  [] No    PLAN: See eval  [x] Continue per plan of care [] Alter current plan (see comments)  [] Plan of care initiated [] Hold pending MD visit [] Discharge    Electronically signed by:        Shikha Herb: [id number]     Frederic Gomes PT, DPT

## 2018-01-18 LAB — DIABETIC RETINOPATHY: NORMAL

## 2018-01-19 ENCOUNTER — TREATMENT (OUTPATIENT)
Dept: PHYSICAL THERAPY | Age: 53
End: 2018-01-19

## 2018-01-19 DIAGNOSIS — M25.511 PAIN IN JOINT OF RIGHT SHOULDER: ICD-10-CM

## 2018-01-19 DIAGNOSIS — M75.01 ADHESIVE CAPSULITIS OF RIGHT SHOULDER: Primary | ICD-10-CM

## 2018-01-19 PROCEDURE — 97110 THERAPEUTIC EXERCISES: CPT | Performed by: PHYSICAL THERAPIST

## 2018-01-19 PROCEDURE — 97112 NEUROMUSCULAR REEDUCATION: CPT | Performed by: PHYSICAL THERAPIST

## 2018-01-19 PROCEDURE — 97140 MANUAL THERAPY 1/> REGIONS: CPT | Performed by: PHYSICAL THERAPIST

## 2018-01-19 NOTE — FLOWSHEET NOTE
[facility]   Phone: [phone]    Fax: [phone]        Physical Therapy Daily Treatment Note  Date:  2018    Patient Name:  Reyes Radish    :  1965  MRN: [id number]  Medical/Treatment Diagnosis Information:  · Diagnosis: R shoulder adhesive capsulitis     DOS: [birth year]  Insurance/Certification information:  PT Insurance Information:   Physician Information:  Referring Practitioner: Dr. Beny Arriola of care signed (Y/N): yes 18    Date of Patient follow up with Physician: 18    G-Code (if applicable):      Date G-Code Applied:  18  PT G-Codes  Functional Assessment Tool Used: UEFI  Score: 59/80 = 73.75%; 26.25% functional limitation  Functional Limitation: Carrying, moving and handling objects  Carrying, Moving and Handling Objects Current Status (): At least 20 percent but less than 40 percent impaired, limited or restricted  Carrying, Moving and Handling Objects Goal Status (): At least 1 percent but less than 20 percent impaired, limited or restricted    Progress Note: []  Yes  [x]  No  Next due by: Visit #10      Latex Allergy:  [x]NO      []YES  Preferred Language for Healthcare:   [x]English       []other:    Visit # Insurance Allowable   6 ???     Pain level:  2/10  1/19/18     SUBJECTIVE:  Patient reports that she has increased soreness in the anterior and lateral R shoulder today. She believe that it may be a result from the introduction of the new stretches last session, and sleeping through the whole night lying on the R shoulder.     2 week postop    OBJECTIVE: See eval                ROM PROM AROM  Comment:  18     L R L R     Flexion   151° 142° 92     Abduction   147 152°       ER   60° C6       IR   57 L4       Other             Other                    Strength: deferred secondary to surgery L R Comment:  18         Special Tests Results/Comment: deferred secondary to surgery: 18 coordination, kinesthetic sense, posture, motor skill, proprioception  to assist with  scapular, scapulothoracic and UE control with self care, reaching, carrying, lifting, house/yardwork, driving/computer work. Therapeutic Activities:    [] (85989 or 78507) Provided verbal/tactile cueing for activities related to improving balance, coordination, kinesthetic sense, posture, motor skill, proprioception and motor activation to allow for proper function of scapular, scapulothoracic and UE control with self care, carrying, lifting, driving/computer work.      Home Exercise Program:    [x] (01899) Reviewed/Progressed HEP activities related to strengthening, flexibility, endurance, ROM of scapular, scapulothoracic and UE control with self care, reaching, carrying, lifting, house/yardwork, driving/computer work  [] (42108) Reviewed/Progressed HEP activities related to improving balance, coordination, kinesthetic sense, posture, motor skill, proprioception of scapular, scapulothoracic and UE control with self care, reaching, carrying, lifting, house/yardwork, driving/computer work      Manual Treatments:  PROM / STM / Oscillations-Mobs:  G-I, II, III, IV (PA's, Inf., Post.)  [x] (46807) Provided manual therapy to mobilize soft tissue/joints of cervical/CT, scapular GHJ and UE for the purpose of modulating pain, promoting relaxation,  increasing ROM, reducing/eliminating soft tissue swelling/inflammation/restriction, improving soft tissue extensibility and allowing for proper ROM for normal function with self care, reaching, carrying, lifting, house/yardwork, driving/computer work      Charges:  Timed Code Treatment Minutes: 45   Total Treatment Minutes: 55     [] EVAL (LOW) 01862 (typically 20 minutes face-to-face)  [] EVAL (MOD) 74166 (typically 30 minutes face-to-face)  [] EVAL (HIGH) 75827 (typically 45 minutes face-to-face)  [] RE-EVAL   [x] UB(71164) x  1   [] IONTO  [x] NMR (72007) x  1   [] VASO  [x] Manual ([id number]) x  1    [] Other:  [] TA (45254) x       [] [facility] T[facility] (45688)  [] ES(attended) (33724)      [] ES (un) (14327):     GOALS:  Patient stated goal: Able to sleep through night without pain.     Therapist goals for Patient:   Short Term Goals: To be achieved in: 2 weeks  1. Independent in HEP and progression per patient tolerance, in order to prevent re-injury. 2. Patient will have a decrease in pain to facilitate improvement in movement, function, and ADLs as indicated by Functional Deficits.     Long Term Goals: To be achieved in: 6 weeks  1. Disability index score of 10% or less for the UEFI to assist with reaching prior level of function. 2. Patient will demonstrate increased R shoulder flexion, abduction, ER, and IR AROM to >155°, >155°, T1, and T12 respectively, to allow for proper joint functioning as indicated by patients Functional Deficits. 3. Patient will demonstrate an increase in R shoulder Strength in all planes by a half grade to allow for proper functional mobility as indicated by patients Functional Deficits. 4. Patient will return to all functional activities, independently, without increased symptoms or restriction. 5. Patient will be able to sleep >6 hours a night without disruption due to pain so that she may return to PLOF for all ADLs. Progression Towards Functional goals:  [] Patient is progressing as expected towards functional goals listed. [] Progression is slowed due to complexities listed. [] Progression has been slowed due to co-morbidities. [x] Plan just implemented, too soon to assess goals progression  [] Other:     ASSESSMENT: Patient exhibits slight increase in R shoulder stiffness today during manual stretching by PT, but tightness reduced with continued stretching. She tolerated introduction of sleeper stretch moderately well today, reporting significant stretch felt.  Plan to progress R shoulder mobility activities, with emphasis on IR, per patient tolerance.     Treatment/Activity Tolerance:  [x] Patient tolerated treatment well [] Patient limited by fatique  [] Patient limited by pain  [] Patient limited by other medical complications  [] Other:     Prognosis: [x] Good [] Fair  [] Poor    Patient Requires Follow-up: [x] Yes  [] No    PLAN: See eval  [x] Continue per plan of care [] Alter current plan (see comments)  [] Plan of care initiated [] Hold pending MD visit [] Discharge    Electronically signed by:        [place]: [id number]     Merry Merrill PT, DPT

## 2018-01-22 ENCOUNTER — TELEPHONE (OUTPATIENT)
Dept: FAMILY MEDICINE CLINIC | Age: 53
End: 2018-01-22

## 2018-01-22 DIAGNOSIS — E10.9 TYPE 1 DIABETES MELLITUS WITHOUT COMPLICATION (HCC): Primary | ICD-10-CM

## 2018-01-22 NOTE — TELEPHONE ENCOUNTER
Waleska from [facility] said they will need a referral sent.      Office [phone]

## 2018-01-24 ENCOUNTER — TREATMENT (OUTPATIENT)
Dept: PHYSICAL THERAPY | Age: 53
End: 2018-01-24

## 2018-01-24 DIAGNOSIS — M25.511 PAIN IN JOINT OF RIGHT SHOULDER: ICD-10-CM

## 2018-01-24 DIAGNOSIS — M75.01 ADHESIVE CAPSULITIS OF RIGHT SHOULDER: Primary | ICD-10-CM

## 2018-01-24 PROCEDURE — 97112 NEUROMUSCULAR REEDUCATION: CPT | Performed by: PHYSICAL THERAPIST

## 2018-01-24 PROCEDURE — 97110 THERAPEUTIC EXERCISES: CPT | Performed by: PHYSICAL THERAPIST

## 2018-01-24 PROCEDURE — 97140 MANUAL THERAPY 1/> REGIONS: CPT | Performed by: PHYSICAL THERAPIST

## 2018-01-24 RX ORDER — BENAZEPRIL HYDROCHLORIDE 20 MG/1
TABLET ORAL
Qty: 90 TABLET | Refills: 1 | Status: SHIPPED | OUTPATIENT
Start: 2018-01-24 | End: 2018-02-23 | Stop reason: ALTCHOICE

## 2018-01-24 NOTE — FLOWSHEET NOTE
[facility]   Phone: [phone]    Fax: [phone]        Physical Therapy Daily Treatment Note  Date:  2018    Patient Name:  Xenia Fleming    :  1965  MRN: [id number]  Medical/Treatment Diagnosis Information:  · Diagnosis: R shoulder adhesive capsulitis     DOS: [birth year]  Insurance/Certification information:  PT Insurance Information:   Physician Information:  Referring Practitioner: Dr. Sosa Has of care signed (Y/N): yes 18    Date of Patient follow up with Physician: 18    G-Code (if applicable):      Date G-Code Applied:  18  PT G-Codes  Functional Assessment Tool Used: UEFI  Score: 59/80 = 73.75%; 26.25% functional limitation  Functional Limitation: Carrying, moving and handling objects  Carrying, Moving and Handling Objects Current Status (): At least 20 percent but less than 40 percent impaired, limited or restricted  Carrying, Moving and Handling Objects Goal Status (): At least 1 percent but less than 20 percent impaired, limited or restricted    Progress Note: []  Yes  [x]  No  Next due by: Visit #10      Latex Allergy:  [x]NO      []YES  Preferred Language for Healthcare:   [x]English       []other:    Visit # Insurance Allowable   7 ???     Pain level:  2/10  1/19/18     SUBJECTIVE:  Patient reports that she is feeling much better than she did last session, noting that the R shoulder is less stiff today. She reports that she has been noticing small improvements with the ability to pull up and adjust her pants on the right side with minimal limitation.     >2 week postop    OBJECTIVE: See eval                ROM PROM AROM  Comment:  18     L R L R     Flexion   151° 142° 92     Abduction   147 152°       ER   60° C6       IR   57 L4       Other             Other                    Strength: deferred secondary to surgery L R Comment:  18         Special Tests Results/Comment: deferred secondary to surgery: 1/5/18      Reflexes/Sensation:               []Dermatomes/Myotomes intact               []Reflexes equal and normal bilaterally              [x]Other: deferred secondary to surgery     Joint mobility: deferred secondary to surgery              []Normal               []Hypo              []Hyper      RESTRICTIONS/PRECAUTIONS:  Lysis of adhesions of R shoulder    Exercises/Interventions:     Exercise/Equipment Resistance/Repetitions Comments   Cardio/Warm-up     Bike          Stretching/PROM     Wand ER 10x10\" At 0° abd   Table Slides Flexion 15x10\"    UE [place] 10x10\" 3 planes   Pulleys 5'    Pendulum      Elbow AROM 20x    Wrist 4 way 20x ea    BB adduction 10x10\"    biceps 10x10\" W/ cane   Wall slide 10x10\"    Doorway ER 10x10\"    Genie stretch 10x10\"    Sleeper stretch 10x10\"         STRENGTH     Isometrics     Retraction 20x5\"    Shrugs 20x5\"     Red ball x 3'    Weight shift     Flexion     Abduction     External Rotation     Internal Rotation     Biceps     Triceps          PRE's     Flexion     Abduction     External Rotation     Internal Rotation     Shrugs     EXT     Reverse Flys     Serratus     Horizontal Abd with ER     Biceps     Triceps     Retraction          Cable Column/Theraband     External Rotation     Internal Rotation     Shrugs     Lats     Ext     Flex     Scapular Retraction     BIC     TRIC     PNF          Neuromuscular Re-ed     Dynamic Stability                              Plyoback                    Manual/Modalities       Manual R shoulder flexion and ER stretching,   10'     CP to R shoulder, 10'               Therapeutic Exercise and NMR EXR  [x] (44589) Provided verbal/tactile cueing for activities related to strengthening, flexibility, endurance, ROM  for improvements in scapular, scapulothoracic and UE control with self care, reaching, carrying, lifting, house/yardwork, driving/computer work.     [x] (96584) Provided verbal/tactile cueing for activities related to improving

## 2018-01-26 ENCOUNTER — TREATMENT (OUTPATIENT)
Dept: PHYSICAL THERAPY | Age: 53
End: 2018-01-26

## 2018-01-26 DIAGNOSIS — M75.01 ADHESIVE CAPSULITIS OF RIGHT SHOULDER: Primary | ICD-10-CM

## 2018-01-26 DIAGNOSIS — M25.511 PAIN IN JOINT OF RIGHT SHOULDER: ICD-10-CM

## 2018-01-30 ENCOUNTER — OFFICE VISIT (OUTPATIENT)
Dept: FAMILY MEDICINE CLINIC | Age: 53
End: 2018-01-30

## 2018-01-30 ENCOUNTER — OFFICE VISIT (OUTPATIENT)
Dept: ORTHOPEDIC SURGERY | Age: 53
End: 2018-01-30

## 2018-01-30 ENCOUNTER — TREATMENT (OUTPATIENT)
Dept: PHYSICAL THERAPY | Age: 53
End: 2018-01-30

## 2018-01-30 VITALS
HEART RATE: 89 BPM | SYSTOLIC BLOOD PRESSURE: 146 MMHG | HEIGHT: 61 IN | OXYGEN SATURATION: 98 % | BODY MASS INDEX: 41.72 KG/M2 | WEIGHT: 221 LBS | DIASTOLIC BLOOD PRESSURE: 70 MMHG

## 2018-01-30 VITALS — DIASTOLIC BLOOD PRESSURE: 79 MMHG | SYSTOLIC BLOOD PRESSURE: 163 MMHG | HEART RATE: 92 BPM

## 2018-01-30 DIAGNOSIS — E10.9 TYPE 1 DIABETES MELLITUS ON INSULIN THERAPY (HCC): ICD-10-CM

## 2018-01-30 DIAGNOSIS — I10 ESSENTIAL HYPERTENSION: ICD-10-CM

## 2018-01-30 DIAGNOSIS — M75.01 ADHESIVE CAPSULITIS OF RIGHT SHOULDER: Primary | ICD-10-CM

## 2018-01-30 DIAGNOSIS — M75.41 IMPINGEMENT SYNDROME OF RIGHT SHOULDER: ICD-10-CM

## 2018-01-30 DIAGNOSIS — R53.83 FATIGUE, UNSPECIFIED TYPE: Primary | ICD-10-CM

## 2018-01-30 DIAGNOSIS — M19.011 OSTEOARTHRITIS OF RIGHT GLENOHUMERAL JOINT: ICD-10-CM

## 2018-01-30 DIAGNOSIS — M25.511 PAIN IN JOINT OF RIGHT SHOULDER: ICD-10-CM

## 2018-01-30 DIAGNOSIS — M75.01 ADHESIVE CAPSULITIS OF RIGHT SHOULDER: ICD-10-CM

## 2018-01-30 DIAGNOSIS — Z98.890 S/P ARTHROSCOPY OF SHOULDER: Primary | ICD-10-CM

## 2018-01-30 PROCEDURE — 97140 MANUAL THERAPY 1/> REGIONS: CPT | Performed by: PHYSICAL THERAPIST

## 2018-01-30 PROCEDURE — 99024 POSTOP FOLLOW-UP VISIT: CPT | Performed by: ORTHOPAEDIC SURGERY

## 2018-01-30 PROCEDURE — 99214 OFFICE O/P EST MOD 30 MIN: CPT | Performed by: FAMILY MEDICINE

## 2018-01-30 PROCEDURE — 97110 THERAPEUTIC EXERCISES: CPT | Performed by: PHYSICAL THERAPIST

## 2018-01-30 PROCEDURE — 97112 NEUROMUSCULAR REEDUCATION: CPT | Performed by: PHYSICAL THERAPIST

## 2018-01-30 RX ORDER — AMLODIPINE BESYLATE AND BENAZEPRIL HYDROCHLORIDE 5; 40 MG/1; MG/1
1 CAPSULE ORAL DAILY
Qty: 30 CAPSULE | Refills: 3 | Status: SHIPPED | OUTPATIENT
Start: 2018-01-30 | End: 2018-02-23 | Stop reason: DRUGHIGH

## 2018-01-30 NOTE — PROGRESS NOTES
Osteoarthritis of right glenohumeral joint        Office Procedures:  Orders Placed This Encounter   Procedures    Ambulatory referral to Physical Therapy     Referral Priority:   Routine     Referral Type:   Eval and Treat     Referral Reason:   Specialty Services Required     Requested Specialty:   Physical Therapy     Number of Visits Requested:   1       Treatment Plan:  Continue in physical therapy. Discontinue Mobic, but add 2 Aleve BID. Risks and benefits of NSAIDs were reviewed and she was advised to talk to her PCP regarding her blood pressure and current medications. Followup in 4 weeks and/or as needed. All questions were answered to patient's satisfaction and was encouraged to call with any further questions or concerns. Candi Rose is in agreement with this plan. Jose Angel Burdick SUKUMAR Lewis  1/30/2018     During this examination, I, Jose Angel Burd[address], PA-C, functioned as a scribe for Dr. Oral Villanueva. The history taking and physical examination were performed by Dr. Oral Villanueva. All counseling during the appointment was performed between the patient and Dr. Oral Villanueva. 1/30/2018 3:56 PM      This dictation was performed with a verbal recognition program (DRAGON) and it was checked for errors. It is possible that there are still dictated errors within this office note. If so, please bring any errors to my attention for an addendum. All efforts were made to ensure that this office note is accurate.  __________________  I, Dr. Naila Bowers, personally performed the services described in this documentation as described by [address] [place] [address] Avenue, PA-C in my presence, and it is both accurate and complete. Maria Esther Villanueva MD, PhD  1/30/2018

## 2018-02-02 ENCOUNTER — TREATMENT (OUTPATIENT)
Dept: PHYSICAL THERAPY | Age: 53
End: 2018-02-02

## 2018-02-02 DIAGNOSIS — M75.01 ADHESIVE CAPSULITIS OF RIGHT SHOULDER: Primary | ICD-10-CM

## 2018-02-02 DIAGNOSIS — M25.511 PAIN IN JOINT OF RIGHT SHOULDER: ICD-10-CM

## 2018-02-02 PROCEDURE — 97140 MANUAL THERAPY 1/> REGIONS: CPT | Performed by: PHYSICAL THERAPIST

## 2018-02-02 PROCEDURE — 97110 THERAPEUTIC EXERCISES: CPT | Performed by: PHYSICAL THERAPIST

## 2018-02-02 PROCEDURE — 97112 NEUROMUSCULAR REEDUCATION: CPT | Performed by: PHYSICAL THERAPIST

## 2018-02-02 NOTE — FLOWSHEET NOTE
[facility]   Phone: [phone]    Fax: [phone]        Physical Therapy Daily Treatment Note  Date:  2018    Patient Name:  Roselle Libman    :  1965  MRN: [id number]  Medical/Treatment Diagnosis Information:  · Diagnosis: R shoulder adhesive capsulitis     DOS: 0/3/75  Insurance/Certification information:  PT Insurance Information:   Physician Information:  Referring Practitioner: Dr. Mirian Castillo of care signed (Y/N): yes 18    Date of Patient follow up with Physician: 18    G-Code (if applicable):      Date G-Code Applied:  18  PT G-Codes  Functional Assessment Tool Used: UEFI  Score: 59/80 = 73.75%; 26.25% functional limitation  Functional Limitation: Carrying, moving and handling objects  Carrying, Moving and Handling Objects Current Status (): At least 20 percent but less than 40 percent impaired, limited or restricted  Carrying, Moving and Handling Objects Goal Status (): At least 1 percent but less than 20 percent impaired, limited or restricted    Progress Note: []  Yes  [x]  No  Next due by: Visit #10      Latex Allergy:  [x]NO      []YES  Preferred Language for Healthcare:   [x]English       []other:    Visit # Insurance Allowable   9 ???     Pain level:  2/10  2/2/18     SUBJECTIVE:  Patient reports that appointment with Dr. Cyndy Ignacio went well. He seemed very pleased with her flexion in the R shoulder, and told her that she will not regain full IR ROM due to the extent of arthritis in the R shoulder. Patient notes that she saw her PCP and they changed her BP medication, and it seems to be helping.     4 week postop    OBJECTIVE: See eval     BP: 167/73 mmHg 18             ROM PROM AROM  Comment:  18     L R L R     Flexion   151° 142° 92     Abduction   147 152°       ER   60° C6       IR   57 L4       Other             Other                    Strength: deferred secondary to surgery L R Comment:  18       Special Tests Results/Comment: deferred secondary to surgery: 1/5/18      Reflexes/Sensation:               []Dermatomes/Myotomes intact               []Reflexes equal and normal bilaterally              [x]Other: deferred secondary to surgery     Joint mobility: deferred secondary to surgery              []Normal               []Hypo              []Hyper      RESTRICTIONS/PRECAUTIONS:  Lysis of adhesions of R shoulder    Exercises/Interventions:     Exercise/Equipment Resistance/Repetitions Comments   Cardio/Warm-up     Bike          Stretching/PROM     Wand ER 10x10\" At 0° abd   Table Slides Flexion 15x10\"    UE [place] 10x10\" 3 planes   Pulleys 5'    Pendulum      Elbow AROM 20x, 2#    Wrist 4 way 20x ea, 2#    BB adduction 10x10\"    biceps 10x10\" W/ cane   Wall slide 10x10\"    Doorway ER 10x10\"    Genie stretch 10x10\"    Sleeper stretch 10x10\"         STRENGTH     Isometrics     Retraction 20x5\"    Shrugs 20x5\"     Red ball x 3'    Weight shift     Flexion     Abduction     External Rotation     Internal Rotation     Biceps     Triceps          PRE's     Flexion     Abduction     External Rotation     Internal Rotation     Shrugs     EXT     Reverse Flys     Serratus     Horizontal Abd with ER     Biceps     Triceps     Retraction          Cable Column/Theraband     External Rotation     Internal Rotation     Shrugs     Lats     Ext     Flex     Scapular Retraction     BIC     TRIC     PNF          Neuromuscular Re-ed     Dynamic Stability                              Plyoback                    Manual/Modalities       Manual R shoulder flexion and ER stretching, high velocity, low amplitude thrusts in anterior, posterior, and inferior planes of R GHJ, 10'                    Therapeutic Exercise and NMR EXR  [x] (44196) Provided verbal/tactile cueing for activities related to strengthening, flexibility, endurance, ROM  for improvements in scapular, scapulothoracic and UE control with self care, face-to-face)  [] EVAL (HIGH) 78271 (typically 45 minutes face-to-face)  [] RE-EVAL   [x] KJ(76571) x  1   [] IONTO  [x] NMR (36392) x  1   [] VASO  [x] Manual (89301) x  1    [] Other:  [] TA (49196) x       [] Mech Traction (04923)  [] ES(attended) (46972)      [] ES (un) (10650):     GOALS:  Patient stated goal: Able to sleep through night without pain.     Therapist goals for Patient:   Short Term Goals: To be achieved in: 2 weeks  1. Independent in HEP and progression per patient tolerance, in order to prevent re-injury. 2. Patient will have a decrease in pain to facilitate improvement in movement, function, and ADLs as indicated by Functional Deficits.     Long Term Goals: To be achieved in: 6 weeks  1. Disability index score of 10% or less for the UEFI to assist with reaching prior level of function. 2. Patient will demonstrate increased R shoulder flexion, abduction, ER, and IR AROM to >155°, >155°, T1, and T12 respectively, to allow for proper joint functioning as indicated by patients Functional Deficits. 3. Patient will demonstrate an increase in R shoulder Strength in all planes by a half grade to allow for proper functional mobility as indicated by patients Functional Deficits. 4. Patient will return to all functional activities, independently, without increased symptoms or restriction. 5. Patient will be able to sleep >6 hours a night without disruption due to pain so that she may return to PLOF for all ADLs. Progression Towards Functional goals:  [] Patient is progressing as expected towards functional goals listed. [] Progression is slowed due to complexities listed. [] Progression has been slowed due to co-morbidities. [x] Plan just implemented, too soon to assess goals progression  [] Other:     ASSESSMENT: Patient exhibits decreased muscle guarding, noted during manual stretching by PT today. She has no episodes of increasing BP over the last 2 days.  Plan to progress IR

## 2018-02-06 ENCOUNTER — TREATMENT (OUTPATIENT)
Dept: PHYSICAL THERAPY | Age: 53
End: 2018-02-06

## 2018-02-06 DIAGNOSIS — M75.01 ADHESIVE CAPSULITIS OF RIGHT SHOULDER: Primary | ICD-10-CM

## 2018-02-06 DIAGNOSIS — M25.511 PAIN IN JOINT OF RIGHT SHOULDER: ICD-10-CM

## 2018-02-06 PROCEDURE — 97140 MANUAL THERAPY 1/> REGIONS: CPT | Performed by: PHYSICAL THERAPIST

## 2018-02-06 PROCEDURE — 97112 NEUROMUSCULAR REEDUCATION: CPT | Performed by: PHYSICAL THERAPIST

## 2018-02-06 PROCEDURE — 97110 THERAPEUTIC EXERCISES: CPT | Performed by: PHYSICAL THERAPIST

## 2018-02-06 NOTE — PLAN OF CARE
[facility]   Phone: [phone]    Fax: [phone]     Physical Therapy Re-Certification Plan of Care    Dear Dr. Yumiko Radford,    We had the pleasure of treating the following patient for physical therapy services at [address] [place] [address]. A summary of our findings can be found in the updated assessment below. This includes our plan of care. If you have any questions or concerns regarding these findings, please do not hesitate to contact me at the office phone number checked above. Thank you for the referral.     Physician Signature:________________________________Date:__________________  By signing above (or electronic signature), therapists plan is approved by physician      Overall Response to Treatment:   [x]Patient is responding well to treatment and improvement is noted with regards  to goals   []Patient should continue to improve in reasonable time if they continue HEP   []Patient has plateaued and is no longer responding to skilled PT intervention    []Patient is getting worse and would benefit from return to referring MD   []Patient unable to adhere to initial POC   []Other:       Physical Therapy Daily Treatment Note  Date:  2018    Patient Name:  Nubia Sanchez    :  1965  MRN: [id number]  Medical/Treatment Diagnosis Information:  · Diagnosis: R shoulder adhesive capsulitis     DOS: 14  Insurance/Certification information:  PT Insurance Information: [facility]  Physician Information:  Referring Practitioner: Dr. Glenroy Garrett of care signed (Y/N): sent 18    Date of Patient follow up with Physician: 18    G-Code (if applicable):      Date G-Code Applied:  18  PT G-Codes  Functional Assessment Tool Used: UEFI  Score: 61/80 ; 24 functional limitation  Functional Limitation: Carrying, moving and handling objects  Carrying, Moving and Handling Objects Current Status ():  At least 20 percent but less than 40 percent Functional Deficits. ONGOING  3. Patient will demonstrate an increase in R shoulder Strength in all planes by a half grade to allow for proper functional mobility as indicated by patients Functional Deficits. ONGOING  4. Patient will return to all functional activities, independently, without increased symptoms or restriction. ONGOING  5. Patient will be able to sleep >6 hours a night without disruption due to pain so that she may return to PLOF for all ADLs. ONGOING    Progression Towards Functional goals:  [x] Patient is progressing as expected towards functional goals listed. [] Progression is slowed due to complexities listed. [] Progression has been slowed due to co-morbidities. [] Plan just implemented, too soon to assess goals progression  [] Other:     ASSESSMENT: Patient demonstrates good progress toward all LTGs previously set by PT. She continues to exhibit greatest limitation in R shoulder IR ROM both actively and passively. She is able to tolerate manual stretching into extension and IR by PT while in standing moderately well today, though she continues to reports soreness. Patient would benefit from continued skilled PT services to address remaining deficits in R shoulder ROM and progress toward strengthening so that she may return to PLOF for all ADLs.     Treatment/Activity Tolerance:  [x] Patient tolerated treatment well [] Patient limited by fatique  [] Patient limited by pain  [] Patient limited by other medical complications  [] Other:     Prognosis: [x] Good [] Fair  [] Poor    Patient Requires Follow-up: [x] Yes  [] No    PLAN: 1-2x/wk for 4-6 weeks  [x] Continue per plan of care [] Alter current plan (see comments)  [] Plan of care initiated [] Hold pending MD visit [] Discharge    Electronically signed by:        Sundar Woody: [id number]     Sasha Earl PT, DPT

## 2018-02-06 NOTE — PROGRESS NOTES
not improving dizziness, take 1/2 to 1 tab po q24h prn dizziness. Yes Cas Aguila, DO   aspirin 81 MG EC tablet Take 81 mg by mouth daily.  Yes Historical Provider, MD   ondansetron (ZOFRAN ODT) 4 MG disintegrating tablet Take 1 tablet by mouth every 8 hours as needed for Nausea or Vomiting  Arsh Ford MD   meloxicam (MOBIC) 15 MG tablet 1 po qday  Ellis Signs, MD          Vitals:    01/30/18 1414 01/30/18 1503   BP: (!) 148/78 (!) 146/70   Site: Left Arm    Position: Sitting    Cuff Size: Large Adult    Pulse: 89    SpO2: 98%    Weight: 221 lb (100.2 kg)    Height: 5' 1\" (1.549 m)       Wt Readings from Last 3 Encounters:   01/30/18 221 lb (100.2 kg)   01/11/18 225 lb 1.4 oz (102.1 kg)   01/05/18 225 lb (102.1 kg)     BP Readings from Last 3 Encounters:   01/30/18 (!) 146/70   01/30/18 (!) 163/79   01/11/18 (!) 157/77       Patient Active Problem List   Diagnosis    History of carpal tunnel syndrome    HYPERLIPIDEMIA    Type 1 diabetes mellitus ([facility] -- diagnosed as 7year-old    Routine general medical examination at a health care facility    Lumbar disc disease    Palpitations    Arthritis, hip    Shoulder pain, bilateral    Murmur    Edema    Vertigo    BMI 40.0-44.9, adult ([address]    Trigger middle finger of left hand    Type 1 diabetes mellitus on insulin therapy ([address]    Dyslipidemia           Immunization History   Administered Date(s) Administered    Influenza Virus Vaccine 10/31/2011, 09/26/2013, 03/01/2016    Influenza, Quadv, 3 yrs and older, IM, Preservative Free 11/09/2016       Past Medical History:   Diagnosis Date    Acute upper respiratory infections of unspecified site 5/11/2010    Anxiety state, unspecified 5/11/2010    Arthritis     Carpal tunnel syndrome 5/11/2010    Ingrowing nail 5/11/2010    Other and unspecified hyperlipidemia 5/11/2010    Pain in joint, pelvic region and thigh 5/11/2010    PONV (postoperative nausea and vomiting)     Screening Psych: normal affect. Neuro: AOx3      ASSESSMENT  1. Fatigue, unspecified type  TSH with Reflex    CORTISOL AM    HEMOGLOBIN A1C    COMPREHENSIVE METABOLIC PANEL   2. Type 1 diabetes mellitus on insulin therapy (HCC)  glucose blood VI test strips (ASCENSIA AUTODISC VI;ONE TOUCH ULTRA TEST VI) strip    glucose blood VI test strips (ASCENSIA AUTODISC VI;ONE TOUCH ULTRA TEST VI) strip    DISCONTINUED: glucose blood VI test strips (ASCENSIA AUTODISC VI;ONE TOUCH ULTRA TEST VI) strip   3. Essential hypertension  amLODIPine-benazepril (LOTREL) 5-40 MG per capsule       See plan below. PLAN          See rest of plan under patient instructions. Return in about 3 months (around 4/30/2018). Patient Instructions   1) Stop the benazepril. Start evening Lotrel. 2) Track your blood pressures. Send "[employer] message with BP update in couple of weeks. 3) Get morning bloodwork done (7-10 am) in Feb and no later than mid March. 4) Review Worry Management tips below. 5) Look into [place] testing. Call clinic day prior if wish to proceed. Worry Management     The following strategies may be used to deal with your worries when you feel that they are becoming overwhelming. 1. Worry Place and Time. Set a 30-minute worry period that will take place at the same time and same place each day. Your worry place and time will be:    2. Delay Worry. If you notice you are worrying outside of your scheduled worry time, tell yourself, I have plenty of time to focus on this later. Right now Im just going to be in the moment and notice what Im doing, what others are doing, the environment, and other things I see, hear, or smell.      3. Worry Log. Record all your worries during your worry time on the Worry Log on the following page and then take time to categorize these worries. You can choose categories that are helpful for you. You might organize them by Big Concerns, Medium Concerns, and Small Concerns.  Another

## 2018-02-08 ENCOUNTER — TELEPHONE (OUTPATIENT)
Dept: FAMILY MEDICINE CLINIC | Age: 53
End: 2018-02-08

## 2018-02-09 NOTE — TELEPHONE ENCOUNTER
In addition, Pt has been approved for Devices/Supplies by the [facility]. 1/8/18 to 7/3/18. See attached sheet for list of authorized services.

## 2018-02-13 ENCOUNTER — TREATMENT (OUTPATIENT)
Dept: PHYSICAL THERAPY | Age: 53
End: 2018-02-13

## 2018-02-13 DIAGNOSIS — M75.01 ADHESIVE CAPSULITIS OF RIGHT SHOULDER: Primary | ICD-10-CM

## 2018-02-13 DIAGNOSIS — M25.511 PAIN IN JOINT OF RIGHT SHOULDER: ICD-10-CM

## 2018-02-13 PROCEDURE — 97140 MANUAL THERAPY 1/> REGIONS: CPT | Performed by: PHYSICAL THERAPIST

## 2018-02-13 PROCEDURE — 97110 THERAPEUTIC EXERCISES: CPT | Performed by: PHYSICAL THERAPIST

## 2018-02-13 PROCEDURE — 97112 NEUROMUSCULAR REEDUCATION: CPT | Performed by: PHYSICAL THERAPIST

## 2018-02-15 ENCOUNTER — TREATMENT (OUTPATIENT)
Dept: PHYSICAL THERAPY | Age: 53
End: 2018-02-15

## 2018-02-15 DIAGNOSIS — M25.511 PAIN IN JOINT OF RIGHT SHOULDER: ICD-10-CM

## 2018-02-15 DIAGNOSIS — M75.01 ADHESIVE CAPSULITIS OF RIGHT SHOULDER: Primary | ICD-10-CM

## 2018-02-15 PROCEDURE — 97140 MANUAL THERAPY 1/> REGIONS: CPT | Performed by: PHYSICAL THERAPIST

## 2018-02-15 PROCEDURE — 97110 THERAPEUTIC EXERCISES: CPT | Performed by: PHYSICAL THERAPIST

## 2018-02-15 PROCEDURE — 97112 NEUROMUSCULAR REEDUCATION: CPT | Performed by: PHYSICAL THERAPIST

## 2018-02-15 NOTE — FLOWSHEET NOTE
Reflexes/Sensation:               []Dermatomes/Myotomes intact               []Reflexes equal and normal bilaterally              [x]Other: Sensation intact to light touch     Joint mobility:               []Normal               [x]Hypo              []Hyper      RESTRICTIONS/PRECAUTIONS:  Lysis of adhesions of R shoulder    Exercises/Interventions:     Exercise/Equipment Resistance/Repetitions Comments   Cardio/Warm-up     Bike          Stretching/PROM     Wand ER 10x10\" At 0° abd   Table Slides Flexion 15x10\"    UE [place] 10x10\" 3 planes   Pulleys 6'    Pendulum      Elbow AROM 20x, 4#    Wrist 4 way 20x ea, 4#    BB adduction 10x10\"    biceps 10x10\" W/ cane   Wall slide 10x10\"    Doorway ER 10x10\"    Genie stretch 10x10\"    Sleeper stretch 10x10\"         STRENGTH     Isometrics     Retraction 20x5\"    Shrugs 20x5\"     Red ball x 3'    Weight shift     Flexion     Abduction     External Rotation     Internal Rotation     Biceps     Triceps          PRE's     Flexion     Abduction     External Rotation     Internal Rotation     Shrugs     EXT     Reverse Flys     Serratus     Horizontal Abd with ER     Biceps     Triceps     Retraction          Cable Column/Theraband     External Rotation     Internal Rotation     Shrugs     Lats     Ext     Flex     Scapular Retraction     BIC     TRIC     PNF          Neuromuscular Re-ed     Dynamic Stability                              Plyoback                    Manual/Modalities       Manual R shoulder flexion and ER stretching, , 10'     CP to R shoulder, 10'               Therapeutic Exercise and NMR EXR  [x] (59612) Provided verbal/tactile cueing for activities related to strengthening, flexibility, endurance, ROM  for improvements in scapular, scapulothoracic and UE control with self care, reaching, carrying, lifting, house/yardwork, driving/computer work.     [x] (11085) Provided verbal/tactile cueing for activities related to improving balance, coordination,

## 2018-02-20 ENCOUNTER — TREATMENT (OUTPATIENT)
Dept: PHYSICAL THERAPY | Age: 53
End: 2018-02-20

## 2018-02-20 DIAGNOSIS — M75.01 ADHESIVE CAPSULITIS OF RIGHT SHOULDER: Primary | ICD-10-CM

## 2018-02-20 DIAGNOSIS — M25.511 PAIN IN JOINT OF RIGHT SHOULDER: ICD-10-CM

## 2018-02-20 PROCEDURE — 97530 THERAPEUTIC ACTIVITIES: CPT | Performed by: PHYSICAL THERAPIST

## 2018-02-20 PROCEDURE — 97140 MANUAL THERAPY 1/> REGIONS: CPT | Performed by: PHYSICAL THERAPIST

## 2018-02-20 PROCEDURE — 97112 NEUROMUSCULAR REEDUCATION: CPT | Performed by: PHYSICAL THERAPIST

## 2018-02-20 PROCEDURE — 97110 THERAPEUTIC EXERCISES: CPT | Performed by: PHYSICAL THERAPIST

## 2018-02-20 NOTE — FLOWSHEET NOTE
lifting, house/yardwork, driving/computer work. [x] (62363) Provided verbal/tactile cueing for activities related to improving balance, coordination, kinesthetic sense, posture, motor skill, proprioception  to assist with  scapular, scapulothoracic and UE control with self care, reaching, carrying, lifting, house/yardwork, driving/computer work. Therapeutic Activities:    [] (37565 or 14665) Provided verbal/tactile cueing for activities related to improving balance, coordination, kinesthetic sense, posture, motor skill, proprioception and motor activation to allow for proper function of scapular, scapulothoracic and UE control with self care, carrying, lifting, driving/computer work.      Home Exercise Program:    [x] (07817) Reviewed/Progressed HEP activities related to strengthening, flexibility, endurance, ROM of scapular, scapulothoracic and UE control with self care, reaching, carrying, lifting, house/yardwork, driving/computer work  [] (57188) Reviewed/Progressed HEP activities related to improving balance, coordination, kinesthetic sense, posture, motor skill, proprioception of scapular, scapulothoracic and UE control with self care, reaching, carrying, lifting, house/yardwork, driving/computer work      Manual Treatments:  PROM / STM / Oscillations-Mobs:  G-I, II, III, IV (PA's, Inf., Post.)  [x] (61024) Provided manual therapy to mobilize soft tissue/joints of cervical/CT, scapular GHJ and UE for the purpose of modulating pain, promoting relaxation,  increasing ROM, reducing/eliminating soft tissue swelling/inflammation/restriction, improving soft tissue extensibility and allowing for proper ROM for normal function with self care, reaching, carrying, lifting, house/yardwork, driving/computer work      Charges:  Timed Code Treatment Minutes: 60   Total Treatment Minutes: 80     [] EVAL (LOW) 77732 (typically 20 minutes face-to-face)  [] EVAL (MOD) 81492 (typically 30 minutes face-to-face)  [] EVAL

## 2018-02-22 ENCOUNTER — TREATMENT (OUTPATIENT)
Dept: PHYSICAL THERAPY | Age: 53
End: 2018-02-22

## 2018-02-22 DIAGNOSIS — G89.29 CHRONIC PAIN OF BOTH SHOULDERS: Primary | ICD-10-CM

## 2018-02-22 DIAGNOSIS — M75.01 ADHESIVE CAPSULITIS OF RIGHT SHOULDER: Primary | ICD-10-CM

## 2018-02-22 DIAGNOSIS — M25.511 CHRONIC PAIN OF BOTH SHOULDERS: Primary | ICD-10-CM

## 2018-02-22 DIAGNOSIS — M25.512 CHRONIC PAIN OF BOTH SHOULDERS: Primary | ICD-10-CM

## 2018-02-22 DIAGNOSIS — M25.511 PAIN IN JOINT OF RIGHT SHOULDER: ICD-10-CM

## 2018-02-22 PROCEDURE — 97530 THERAPEUTIC ACTIVITIES: CPT | Performed by: PHYSICAL THERAPIST

## 2018-02-22 PROCEDURE — 97110 THERAPEUTIC EXERCISES: CPT | Performed by: PHYSICAL THERAPIST

## 2018-02-22 PROCEDURE — 97140 MANUAL THERAPY 1/> REGIONS: CPT | Performed by: PHYSICAL THERAPIST

## 2018-02-22 PROCEDURE — MISCD86 TENNIS ELBOW STRAP-BREG: Performed by: ORTHOPAEDIC SURGERY

## 2018-02-22 NOTE — FLOWSHEET NOTE
[facility]   Phone: [phone]    Fax: [phone]    Physical Therapy Daily Treatment Note  Date:  2018    Patient Name:  Anna Garibay    :  1965  MRN: [id number]  Medical/Treatment Diagnosis Information:  · Diagnosis: R shoulder adhesive capsulitis     DOS:   Insurance/Certification information:  PT Insurance Information:   Physician Information:  Referring Practitioner: Dr. Sybil Kim of care signed (Y/N): yes 18    Date of Patient follow up with Physician: 18    G-Code (if applicable):      Date G-Code Applied:  18  PT G-Codes  Functional Assessment Tool Used: UEFI  Score: 61/80 ; 24 functional limitation  Functional Limitation: Carrying, moving and handling objects  Carrying, Moving and Handling Objects Current Status (): At least 20 percent but less than 40 percent impaired, limited or restricted  Carrying, Moving and Handling Objects Goal Status (): At least 1 percent but less than 20 percent impaired, limited or restricted    Progress Note: []  Yes  [x]  No  Next due by: 3/6/18      Latex Allergy:  [x]NO      []YES  Preferred Language for Healthcare:   [x]English       []other:    Visit # Insurance Allowable   14 16     Pain level:  5/10  18     SUBJECTIVE:  Patient reports that she remains very stiff today, but feels it is slightly better than it did on Tuesday.     >6 week postop    OBJECTIVE: See eval     BP: 167/73 mmHg 18             ROM PROM AROM  Comment:  18     L R L R     Flexion   145° 142° 142°     Abduction   147° 152°  144° scap    ER   75° C6  C7     IR   47° L4       Other             Other                    Strength: deferred secondary to surgery L R Comment:  18         Special Tests Results/Comment: deferred secondary to surgery: 18      Reflexes/Sensation:               []Dermatomes/Myotomes intact               []Reflexes equal and normal bilaterally limited by fatique  [] Patient limited by pain  [] Patient limited by other medical complications  [] Other:     Prognosis: [x] Good [] Fair  [] Poor    Patient Requires Follow-up: [x] Yes  [] No    PLAN: 1-2x/wk for 4-6 weeks  [x] Continue per plan of care [] Alter current plan (see comments)  [] Plan of care initiated [] Hold pending MD visit [] Discharge    Electronically signed by:        [facility]: [id number]     Nadja Bain PT, DPT

## 2018-02-23 ENCOUNTER — OFFICE VISIT (OUTPATIENT)
Dept: FAMILY MEDICINE CLINIC | Age: 53
End: 2018-02-23

## 2018-02-23 VITALS
TEMPERATURE: 98.9 F | SYSTOLIC BLOOD PRESSURE: 152 MMHG | HEART RATE: 96 BPM | OXYGEN SATURATION: 96 % | RESPIRATION RATE: 20 BRPM | WEIGHT: 219.6 LBS | DIASTOLIC BLOOD PRESSURE: 74 MMHG | BODY MASS INDEX: 41.49 KG/M2

## 2018-02-23 DIAGNOSIS — R94.31 ABNORMAL EKG: ICD-10-CM

## 2018-02-23 DIAGNOSIS — R00.2 PALPITATIONS: ICD-10-CM

## 2018-02-23 DIAGNOSIS — E10.9 TYPE 1 DIABETES MELLITUS ON INSULIN THERAPY (HCC): ICD-10-CM

## 2018-02-23 DIAGNOSIS — R01.1 SYSTOLIC EJECTION MURMUR: ICD-10-CM

## 2018-02-23 DIAGNOSIS — N91.2 NO PERIODS: ICD-10-CM

## 2018-02-23 DIAGNOSIS — F41.9 ANXIETY: ICD-10-CM

## 2018-02-23 DIAGNOSIS — R42 VERTIGO: ICD-10-CM

## 2018-02-23 DIAGNOSIS — I10 ESSENTIAL HYPERTENSION: Primary | ICD-10-CM

## 2018-02-23 PROCEDURE — 93000 ELECTROCARDIOGRAM COMPLETE: CPT | Performed by: FAMILY MEDICINE

## 2018-02-23 PROCEDURE — 99214 OFFICE O/P EST MOD 30 MIN: CPT | Performed by: FAMILY MEDICINE

## 2018-02-23 RX ORDER — AMLODIPINE BESYLATE AND BENAZEPRIL HYDROCHLORIDE 10; 40 MG/1; MG/1
1 CAPSULE ORAL DAILY
Qty: 30 CAPSULE | Refills: 3 | Status: SHIPPED | OUTPATIENT
Start: 2018-02-23 | End: 2018-03-27

## 2018-02-23 RX ORDER — DIAZEPAM 5 MG/1
TABLET ORAL
Qty: 30 TABLET | Refills: 0 | Status: SHIPPED | OUTPATIENT
Start: 2018-02-23 | End: 2018-09-14 | Stop reason: SDUPTHER

## 2018-02-27 ENCOUNTER — TREATMENT (OUTPATIENT)
Dept: PHYSICAL THERAPY | Age: 53
End: 2018-02-27

## 2018-02-27 DIAGNOSIS — M25.511 PAIN IN JOINT OF RIGHT SHOULDER: ICD-10-CM

## 2018-02-27 DIAGNOSIS — R53.83 FATIGUE, UNSPECIFIED TYPE: ICD-10-CM

## 2018-02-27 DIAGNOSIS — M75.01 ADHESIVE CAPSULITIS OF RIGHT SHOULDER: Primary | ICD-10-CM

## 2018-02-27 DIAGNOSIS — N91.2 NO PERIODS: ICD-10-CM

## 2018-02-27 LAB
A/G RATIO: 1.9 (ref 1.1–2.2)
ALBUMIN SERPL-MCNC: 4.7 G/DL (ref 3.4–5)
ALP BLD-CCNC: 93 U/L (ref 40–129)
ALT SERPL-CCNC: 18 U/L (ref 10–40)
ANION GAP SERPL CALCULATED.3IONS-SCNC: 14 MMOL/L (ref 3–16)
AST SERPL-CCNC: 15 U/L (ref 15–37)
BILIRUB SERPL-MCNC: 0.6 MG/DL (ref 0–1)
BUN BLDV-MCNC: 11 MG/DL (ref 7–20)
CALCIUM SERPL-MCNC: 9.6 MG/DL (ref 8.3–10.6)
CHLORIDE BLD-SCNC: 103 MMOL/L (ref 99–110)
CO2: 25 MMOL/L (ref 21–32)
CORTISOL - AM: 12.3 UG/DL (ref 4.3–22.4)
CREAT SERPL-MCNC: 0.6 MG/DL (ref 0.6–1.1)
FOLLICLE STIMULATING HORMONE: 45.6 MIU/ML
GFR AFRICAN AMERICAN: >60
GFR NON-AFRICAN AMERICAN: >60
GLOBULIN: 2.5 G/DL
GLUCOSE BLD-MCNC: 165 MG/DL (ref 70–99)
POTASSIUM SERPL-SCNC: 4.1 MMOL/L (ref 3.5–5.1)
SODIUM BLD-SCNC: 142 MMOL/L (ref 136–145)
TOTAL PROTEIN: 7.2 G/DL (ref 6.4–8.2)
TSH REFLEX: 3.43 UIU/ML (ref 0.27–4.2)

## 2018-02-28 ENCOUNTER — HOSPITAL ENCOUNTER (OUTPATIENT)
Dept: NON INVASIVE DIAGNOSTICS | Age: 53
Discharge: HOME | End: 2018-02-28
Attending: FAMILY MEDICINE | Admitting: FAMILY MEDICINE

## 2018-02-28 DIAGNOSIS — R01.1 SYSTOLIC EJECTION MURMUR: ICD-10-CM

## 2018-02-28 DIAGNOSIS — R94.31 ABNORMAL ECG: ICD-10-CM

## 2018-02-28 DIAGNOSIS — R00.2 PALPITATIONS: ICD-10-CM

## 2018-02-28 LAB
ESTIMATED AVERAGE GLUCOSE: 194.4 MG/DL
HBA1C MFR BLD: 8.4 %
LV EF: 58 %
LV EF: 80 %
LVEF MODALITY: NORMAL
LVEF MODALITY: NORMAL

## 2018-02-28 RX ORDER — SODIUM CHLORIDE 0.9 % (FLUSH) 0.9 %
10 SYRINGE (ML) INJECTION 2 TIMES DAILY
Status: DISCONTINUED | OUTPATIENT
Start: 2018-02-28 | End: 2018-03-01 | Stop reason: HOSPADM

## 2018-02-28 RX ORDER — SODIUM CHLORIDE 0.9 % (FLUSH) 0.9 %
10 SYRINGE (ML) INJECTION PRN
Status: DISCONTINUED | OUTPATIENT
Start: 2018-02-28 | End: 2018-03-01 | Stop reason: HOSPADM

## 2018-02-28 RX ADMIN — Medication 10 ML: at 14:31

## 2018-02-28 RX ADMIN — Medication 10 ML: at 12:58

## 2018-02-28 RX ADMIN — Medication 10 ML: at 12:23

## 2018-03-01 ENCOUNTER — OFFICE VISIT (OUTPATIENT)
Dept: ORTHOPEDIC SURGERY | Age: 53
End: 2018-03-01

## 2018-03-01 ENCOUNTER — TELEPHONE (OUTPATIENT)
Dept: ORTHOPEDIC SURGERY | Age: 53
End: 2018-03-01

## 2018-03-01 ENCOUNTER — TREATMENT (OUTPATIENT)
Dept: PHYSICAL THERAPY | Age: 53
End: 2018-03-01

## 2018-03-01 VITALS
WEIGHT: 218.92 LBS | HEIGHT: 61 IN | DIASTOLIC BLOOD PRESSURE: 76 MMHG | HEART RATE: 82 BPM | BODY MASS INDEX: 41.33 KG/M2 | SYSTOLIC BLOOD PRESSURE: 144 MMHG

## 2018-03-01 DIAGNOSIS — M25.511 PAIN IN JOINT OF RIGHT SHOULDER: ICD-10-CM

## 2018-03-01 DIAGNOSIS — M75.41 IMPINGEMENT SYNDROME OF RIGHT SHOULDER: ICD-10-CM

## 2018-03-01 DIAGNOSIS — M75.01 ADHESIVE CAPSULITIS OF RIGHT SHOULDER: ICD-10-CM

## 2018-03-01 DIAGNOSIS — G89.29 CHRONIC PAIN OF BOTH SHOULDERS: ICD-10-CM

## 2018-03-01 DIAGNOSIS — M75.01 ADHESIVE CAPSULITIS OF RIGHT SHOULDER: Primary | ICD-10-CM

## 2018-03-01 DIAGNOSIS — M25.512 CHRONIC PAIN OF BOTH SHOULDERS: ICD-10-CM

## 2018-03-01 DIAGNOSIS — Z98.890 S/P ARTHROSCOPY OF SHOULDER: Primary | ICD-10-CM

## 2018-03-01 DIAGNOSIS — M25.511 CHRONIC PAIN OF BOTH SHOULDERS: ICD-10-CM

## 2018-03-01 PROCEDURE — 97140 MANUAL THERAPY 1/> REGIONS: CPT | Performed by: PHYSICAL THERAPIST

## 2018-03-01 PROCEDURE — L3908 WHO COCK-UP NONMOLDE PRE OTS: HCPCS | Performed by: ORTHOPAEDIC SURGERY

## 2018-03-01 PROCEDURE — 97530 THERAPEUTIC ACTIVITIES: CPT | Performed by: PHYSICAL THERAPIST

## 2018-03-01 PROCEDURE — 99024 POSTOP FOLLOW-UP VISIT: CPT | Performed by: ORTHOPAEDIC SURGERY

## 2018-03-01 PROCEDURE — MISCD86 TENNIS ELBOW STRAP-BREG: Performed by: ORTHOPAEDIC SURGERY

## 2018-03-01 PROCEDURE — 97110 THERAPEUTIC EXERCISES: CPT | Performed by: PHYSICAL THERAPIST

## 2018-03-01 RX ORDER — IBUPROFEN 200 MG
800 TABLET ORAL EVERY 6 HOURS PRN
Qty: 90 TABLET | Refills: 3 | OUTPATIENT
Start: 2018-03-01 | End: 2018-04-12

## 2018-03-01 NOTE — PLAN OF CARE
[facility]   Phone: [phone]    Fax: [phone]   Physical Therapy Discharge Summary    Dear Dr. Brisa Godwin,    We had the pleasure of treating the following patient for physical therapy services at [address] [place] [address]. A summary of our findings can be found in the discharge summary below. If you have any questions or concerns regarding these findings, please do not hesitate to contact me at the office phone number checked above. Thank you for the referral.     Physician Signature:________________________________Date:__________________  By signing above (or electronic signature), therapists plan is approved by physician      Overall Response to Treatment:   [x]Patient is responding well to treatment and improvement is noted with regards  to goals   []Patient should continue to improve in reasonable time if they continue HEP   []Patient has plateaued and is no longer responding to skilled PT intervention    []Patient is getting worse and would benefit from return to referring MD   []Patient unable to adhere to initial POC   [x]Other: Patient is being placed on a medical hold for several weeks/moths until her A1C levels are at a level that she can receive an injection to the L R shoulder.     Date range of Visits: 18-3/1/18    Total Visits: 15      Physical Therapy Daily Treatment Note  Date:  3/1/2018    Patient Name:  Deb Barone    :  1965  MRN: [id number]  Medical/Treatment Diagnosis Information:  · Diagnosis: R shoulder adhesive capsulitis     DOS: 57  Insurance/Certification information:  PT Insurance Information:   Physician Information:  Referring Practitioner: Dr. Daryle Melena of care signed (Y/N): sent 3/1/18    Date of Patient follow up with Physician: 3/1/18    G-Code (if applicable):      Date G-Code Applied:  3/1/18  PT G-Codes  Functional Assessment Tool Used: UEFI  Score: 60/80 ; 25% functional scapular, scapulothoracic and UE control with self care, carrying, lifting, driving/computer work. Home Exercise Program:    [x] (39053) Reviewed/Progressed HEP activities related to strengthening, flexibility, endurance, ROM of scapular, scapulothoracic and UE control with self care, reaching, carrying, lifting, house/yardwork, driving/computer work  [] (02422) Reviewed/Progressed HEP activities related to improving balance, coordination, kinesthetic sense, posture, motor skill, proprioception of scapular, scapulothoracic and UE control with self care, reaching, carrying, lifting, house/yardwork, driving/computer work      Manual Treatments:  PROM / STM / Oscillations-Mobs:  G-I, II, III, IV (PA's, Inf., Post.)  [x] (61956) Provided manual therapy to mobilize soft tissue/joints of cervical/CT, scapular GHJ and UE for the purpose of modulating pain, promoting relaxation,  increasing ROM, reducing/eliminating soft tissue swelling/inflammation/restriction, improving soft tissue extensibility and allowing for proper ROM for normal function with self care, reaching, carrying, lifting, house/yardwork, driving/computer work      Charges:  Timed Code Treatment Minutes: 40   Total Treatment Minutes: 60     [] EVAL (LOW) 06158 (typically 20 minutes face-to-face)  [] EVAL (MOD) 05254 (typically 30 minutes face-to-face)  [] EVAL (HIGH) 98082 (typically 45 minutes face-to-face)  [] RE-EVAL   [x] CA(93762) x  1   [] IONTO  [] NMR (00824) x      [] VASO  [x] Manual (98568) x  1    [] Other:  [x] TA (91153) x  1    [] Mech Traction (06461)  [] ES(attended) (10895)      [] ES (un) (24604):     GOALS:  Patient stated goal: Able to sleep through night without pain.     Therapist goals for Patient:   Short Term Goals: To be achieved in: 2 weeks  1. Independent in HEP and progression per patient tolerance, in order to prevent re-injury. MET  2.  Patient will have a decrease in pain to facilitate improvement in movement, function, and ADLs as indicated by Functional Deficits. PARTIALLY MET     Long Term Goals: To be achieved in: 6 weeks  1. Disability index score of 10% or less for the UEFI to assist with reaching prior level of function. PARTIALLY MET  2. Patient will demonstrate increased R shoulder flexion, abduction, ER, and IR AROM to >155°, >155°, T1, and T12 respectively, to allow for proper joint functioning as indicated by patients Functional Deficits. PARTIALLY MET  3. Patient will demonstrate an increase in R shoulder Strength in all planes by a half grade to allow for proper functional mobility as indicated by patients Functional Deficits. PARTIALLY MET  4. Patient will return to all functional activities, independently, without increased symptoms or restriction. PARTIALLY MET  5. Patient will be able to sleep >6 hours a night without disruption due to pain so that she may return to PLOF for all ADLs. PARTIALLY MET    Progression Towards Functional goals:  [x] Patient is progressing as expected towards functional goals listed. [] Progression is slowed due to complexities listed. [] Progression has been slowed due to co-morbidities. [] Plan just implemented, too soon to assess goals progression  [] Other:     ASSESSMENT: Patient continues to demonstrate slow progress toward LTGs previously set by PT. Per MD, patient will be discontinuing skilled PT services at this time, until she is able to receive an injection to the R shoulder. She was instructed in continuing with her HEP, in which she demonstrates full independence. Patient was instructed to contact PT with any questions or concerns. Patient is being discharged from skilled PT services at this time.     Treatment/Activity Tolerance:  [x] Patient tolerated treatment well [] Patient limited by fatique  [] Patient limited by pain  [] Patient limited by other medical complications  [] Other:     Prognosis: [x] Good [] Fair  [] Poor    Patient Requires Follow-up: [] Yes  [x]

## 2018-03-02 ENCOUNTER — TELEPHONE (OUTPATIENT)
Dept: FAMILY MEDICINE CLINIC | Age: 53
End: 2018-03-02

## 2018-03-02 ENCOUNTER — TELEPHONE (OUTPATIENT)
Dept: CARDIOLOGY CLINIC | Age: 53
End: 2018-03-02

## 2018-03-02 DIAGNOSIS — R00.2 PALPITATIONS: Primary | ICD-10-CM

## 2018-03-02 DIAGNOSIS — R94.31 EKG ABNORMALITY: ICD-10-CM

## 2018-03-02 DIAGNOSIS — I51.89 DIASTOLIC DYSFUNCTION WITHOUT HEART FAILURE: ICD-10-CM

## 2018-03-02 NOTE — TELEPHONE ENCOUNTER
Patient will be a new patient for doctor Radha Cunha. She has seen [facility] in past. She got and echo that showed diastolic dysfunction and she would like a call explaining to her what that's is. Her  Told her she had to be seen in next 30 days and that worries her a little.  Please call her at [phone]

## 2018-03-02 NOTE — TELEPHONE ENCOUNTER
--please complete /healthnet form for referral inside the 72 hours window (basically ASAP). --call [facility] to confirm Dr. Gisela Bobby new office address and fax to fax the [facility] referral order. --share info with patient to set up appt.

## 2018-03-02 NOTE — TELEPHONE ENCOUNTER
Referral reentered for patient to see Dr. Phuc Barrios instead. Please complete /healthnet forms and fax referral form. Send Mychart message for patient to call to make appt with Dr. Phuc Barrios and verify with her health insurance plan that the referral was approved.

## 2018-03-05 ENCOUNTER — HOSPITAL ENCOUNTER (OUTPATIENT)
Dept: NON INVASIVE DIAGNOSTICS | Age: 53
Discharge: HOME | End: 2018-03-05
Attending: FAMILY MEDICINE | Admitting: FAMILY MEDICINE

## 2018-03-05 ENCOUNTER — TELEPHONE (OUTPATIENT)
Dept: FAMILY MEDICINE CLINIC | Age: 53
End: 2018-03-05

## 2018-03-05 DIAGNOSIS — I51.89 DIASTOLIC DYSFUNCTION WITHOUT HEART FAILURE: ICD-10-CM

## 2018-03-05 DIAGNOSIS — R00.2 PALPITATIONS: ICD-10-CM

## 2018-03-05 DIAGNOSIS — R93.1 ABNORMAL ECHOCARDIOGRAM: Primary | ICD-10-CM

## 2018-03-05 NOTE — TELEPHONE ENCOUNTER
Not able to get appt with Dr. Angel Collins. Seeing Dr. Dino Bernal instead. Please call [facility] to verify no separate referral needed.   Kindly update Dejuan Powers either way status of this

## 2018-03-06 NOTE — TELEPHONE ENCOUNTER
Jose Liang told me this when I called to tell her I sent the form for urgent referral. No separate referral needed since in same practice.  Patient is already aware

## 2018-03-09 LAB
ACQUISITION DURATION: NORMAL S
AVERAGE HEART RATE: 87 BPM
EKG DIAGNOSIS: NORMAL
HOLTER MAX HEART RATE: 126 BPM
HOOKUP DATE: NORMAL
HOOKUP TIME: NORMAL
Lab: NORMAL
MAX HEART RATE TIME/DATE: NORMAL
MIN HEART RATE TIME/DATE: NORMAL
MIN HEART RATE: 71 BPM
NUMBER OF QRS COMPLEXES: NORMAL
NUMBER OF SUPRAVENTRICULAR BEATS IN RUNS: 0
NUMBER OF SUPRAVENTRICULAR COUPLETS: 0
NUMBER OF SUPRAVENTRICULAR ECTOPICS: 10
NUMBER OF SUPRAVENTRICULAR ISOLATED BEATS: 10
NUMBER OF SUPRAVENTRICULAR RUNS: 0
NUMBER OF VENTRICULAR BEATS IN RUNS: 0
NUMBER OF VENTRICULAR BIGEMINAL CYCLES: 0
NUMBER OF VENTRICULAR COUPLETS: 0
NUMBER OF VENTRICULAR ECTOPICS: 1
NUMBER OF VENTRICULAR ISOLATED BEATS: 1
NUMBER OF VENTRICULAR RUNS: 0

## 2018-03-13 NOTE — PROGRESS NOTES
History of Present Illness:  [address] [place] [address] for follow-up of her right shoulder. She continues to participate in physical therapy to try to get her range of motion back following arthroscopic capsular release back in January. Recently she's been noticing pain over the outside of her right elbow. It happens doing some of her stretching exercises. Overall her range of motion is better than before surgery. She rates her pain level today at 2 out of 10. She has been a diabetic for 40 years, ever since age 7. Her most recent hemoglobin A1c is 8.4. Medical History:  Patient's medications, allergies, past medical, surgical, social and family histories were reviewed and updated as appropriate. Pertinent items are noted in HPI  Review of systems reviewed from Patient History Form dated on July 11, 2017 and available in the patient's chart under the Media tab. Vital Signs:  Vitals:    03/01/18 1137   BP: (!) 144/76   Pulse: 82     Constitutional: She is in no apparent distress. Shoulder Examination:    Inspection:  The right shoulder is benign appearing. Palpation:   No crepitus. Active Range of Motion: Range of motion is about the same As at her last visit. Elevation 120 degrees and external rotation of the side 40. Internal rotation remains limited to the Back pocket. .    Passive Range of Motion:  Slight deficits compared to normal.  Pain at end range. Strength:  She has 4 over 5 external rotation strength. Special Tests:  No Alan muscle deformity. Right elbow examination: Tenderness over the lateral condyle. Positive piano key test.  Range of motion is normal.    Left shoulder. She has motion deficits on that side as well. Elevation 110 and internal rotation to the back to lumbosacral junction. Assessment :  My impression is that Raghu Matos is doing well. However she has developed a mild tennis elbow.   We need to treat this as well    Impression:  Encounter Diagnoses Name Primary?  Chronic pain of both shoulders     Adhesive capsulitis of right shoulder     Impingement syndrome of right shoulder     S/P arthroscopy of shoulder Yes       Office Procedures:  Orders Placed This Encounter   Procedures    Aircast Arm Band $20     Patient was supplied a Aircast Arm Band. This retail item was supplied to provide functional support and assist in protecting the affected area. Verbal and written instructions for the use of and application of this item were provided. The patient was educated and fit by a healthcare professional with expert knowledge and specialization in brace application. They were instructed to contact the office immediately should the equipment result in increased pain, decreased sensation, increased swelling or worsening of the condition.  Amb External Referral To Physical Therapy     Referral Priority:   Routine     Referral Type:   Consult for Advice and Opinion     Referral Reason:   Patient Preference     Requested Specialty:   Physical Therapy     Number of Visits Requested:   1    Te[employer]ee [employer] Titan Wrist Short Brace     Patient was prescribed a Te[employer]ee King Titan Wrist Orthosis. The right wrist will require stabilization / immobilization from this semi-rigid / rigid orthosis to improve their function. The orthosis will assist in protecting the affected area, provide functional support and facilitate healing. The patient was educated and fit by a healthcare professional with expert knowledge and specialization in brace application while under the direct supervision of the treating physician. Verbal and written instructions for the use of and application of this item were provided. They were instructed to contact the office immediately should the brace result in increased pain, decreased sensation, increased swelling or worsening of the condition. Treatment Plan:  I'm recommending additional stretches for her elbow.   We'll

## 2018-03-27 ENCOUNTER — OFFICE VISIT (OUTPATIENT)
Dept: CARDIOLOGY CLINIC | Age: 53
End: 2018-03-27

## 2018-03-27 VITALS
BODY MASS INDEX: 41.78 KG/M2 | HEART RATE: 89 BPM | WEIGHT: 221 LBS | DIASTOLIC BLOOD PRESSURE: 70 MMHG | SYSTOLIC BLOOD PRESSURE: 150 MMHG

## 2018-03-27 DIAGNOSIS — I49.3 PVC (PREMATURE VENTRICULAR CONTRACTION): ICD-10-CM

## 2018-03-27 DIAGNOSIS — R00.2 PALPITATIONS: ICD-10-CM

## 2018-03-27 DIAGNOSIS — I49.1 PAC (PREMATURE ATRIAL CONTRACTION): ICD-10-CM

## 2018-03-27 DIAGNOSIS — I10 ESSENTIAL HYPERTENSION: Primary | ICD-10-CM

## 2018-03-27 PROCEDURE — 99215 OFFICE O/P EST HI 40 MIN: CPT | Performed by: INTERNAL MEDICINE

## 2018-03-27 PROCEDURE — 93000 ELECTROCARDIOGRAM COMPLETE: CPT | Performed by: INTERNAL MEDICINE

## 2018-03-27 RX ORDER — HYDROCHLOROTHIAZIDE 25 MG/1
25 TABLET ORAL DAILY
Qty: 30 TABLET | Refills: 3 | Status: SHIPPED | OUTPATIENT
Start: 2018-03-27 | End: 2018-06-27 | Stop reason: SDUPTHER

## 2018-03-27 RX ORDER — BENAZEPRIL HYDROCHLORIDE 40 MG/1
40 TABLET, FILM COATED ORAL DAILY
COMMUNITY
End: 2018-09-14 | Stop reason: SDUPTHER

## 2018-03-27 ASSESSMENT — ENCOUNTER SYMPTOMS
EYES NEGATIVE: 1
GASTROINTESTINAL NEGATIVE: 1
RESPIRATORY NEGATIVE: 1
ORTHOPNEA: 1

## 2018-03-27 NOTE — PROGRESS NOTES
Department Of Internal Medicine  General Medicine/Primary Care  Established Patient Visit    Patient:  Gunnar Stone                                               : 1965  Age: 47 y.o. MRN: [id number]  Date : 3/27/2018    History Obtained From:  patient    REASON FOR VISIT:  New pat visit for diastolic HF and palpitations    HISTORY OF PRESENT ILLNESS:   The patient is a 47 y.o. female with a history of Type 1 DM, HTN, frozen shoulder, palpitations, HLD. She is sent here from her PCP due to diastolic dysfunction found on recent echo. In February she woke up with heart palpitations associated with chest pain that she described as tightness. She does not have these episodes during the day. She believes it was her amlodipine. She now longer takes that. Also describes orthopnea. Sleeps with 2 pillows. She feels pressure in her chest occasionally through the day but not when she is active. Has some anxiety that she feels it is associated with. She does not exercise. Does not smoke.      Past Medical History:        Diagnosis Date    Acute upper respiratory infections of unspecified site 2010    Anxiety state, unspecified 2010    Arthritis     Carpal tunnel syndrome 2010    Ingrowing nail 2010    Other and unspecified hyperlipidemia 2010    Pain in joint, pelvic region and thigh 2010    PONV (postoperative nausea and vomiting)     Screening 2010    Type I (juvenile type) diabetes mellitus without mention of complication, not stated as uncontrolled 2010    Unspecified arthropathy, ankle and foot 2010    Unspecified essential hypertension 2010    Urinary tract infection, site not specified 2010    Wrist fracture     left       Past Surgical History:        Procedure Laterality Date    CARPAL TUNNEL RELEASE Bilateral      SECTION      FINGER TRIGGER RELEASE      3 of them    SHOULDER ARTHROSCOPY Right 2018    RIGHT SHOULDER

## 2018-04-12 ENCOUNTER — OFFICE VISIT (OUTPATIENT)
Dept: FAMILY MEDICINE CLINIC | Age: 53
End: 2018-04-12

## 2018-04-12 VITALS
BODY MASS INDEX: 41.1 KG/M2 | SYSTOLIC BLOOD PRESSURE: 128 MMHG | RESPIRATION RATE: 14 BRPM | WEIGHT: 217.4 LBS | HEART RATE: 85 BPM | DIASTOLIC BLOOD PRESSURE: 70 MMHG | OXYGEN SATURATION: 96 % | TEMPERATURE: 98.7 F

## 2018-04-12 DIAGNOSIS — S86.911A KNEE STRAIN, RIGHT, INITIAL ENCOUNTER: Primary | ICD-10-CM

## 2018-04-12 DIAGNOSIS — M51.9 LUMBAR DISC DISEASE: ICD-10-CM

## 2018-04-12 PROCEDURE — 99213 OFFICE O/P EST LOW 20 MIN: CPT | Performed by: FAMILY MEDICINE

## 2018-04-12 RX ORDER — CELECOXIB 200 MG/1
200 CAPSULE ORAL DAILY PRN
Qty: 90 CAPSULE | Refills: 3 | Status: SHIPPED | OUTPATIENT
Start: 2018-04-12 | End: 2018-05-01 | Stop reason: SDUPTHER

## 2018-04-12 RX ORDER — CELECOXIB 200 MG/1
CAPSULE ORAL
Qty: 60 CAPSULE | Refills: 0 | Status: SHIPPED | OUTPATIENT
Start: 2018-04-12 | End: 2019-05-31

## 2018-04-17 DIAGNOSIS — I10 ESSENTIAL HYPERTENSION: ICD-10-CM

## 2018-04-17 LAB
ANION GAP SERPL CALCULATED.3IONS-SCNC: 13 MMOL/L (ref 3–16)
BUN BLDV-MCNC: 13 MG/DL (ref 7–20)
CALCIUM SERPL-MCNC: 9.3 MG/DL (ref 8.3–10.6)
CHLORIDE BLD-SCNC: 96 MMOL/L (ref 99–110)
CO2: 28 MMOL/L (ref 21–32)
CREAT SERPL-MCNC: 0.7 MG/DL (ref 0.6–1.1)
GFR AFRICAN AMERICAN: >60
GFR NON-AFRICAN AMERICAN: >60
GLUCOSE BLD-MCNC: 195 MG/DL (ref 70–99)
POTASSIUM SERPL-SCNC: 3.8 MMOL/L (ref 3.5–5.1)
SODIUM BLD-SCNC: 137 MMOL/L (ref 136–145)

## 2018-04-19 ENCOUNTER — TELEPHONE (OUTPATIENT)
Dept: CARDIOLOGY CLINIC | Age: 53
End: 2018-04-19

## 2018-05-01 ENCOUNTER — OFFICE VISIT (OUTPATIENT)
Dept: CARDIOLOGY CLINIC | Age: 53
End: 2018-05-01

## 2018-05-01 VITALS
SYSTOLIC BLOOD PRESSURE: 120 MMHG | DIASTOLIC BLOOD PRESSURE: 60 MMHG | HEART RATE: 86 BPM | BODY MASS INDEX: 41.36 KG/M2 | WEIGHT: 218.8 LBS

## 2018-05-01 DIAGNOSIS — I51.89 DIASTOLIC DYSFUNCTION WITHOUT HEART FAILURE: ICD-10-CM

## 2018-05-01 DIAGNOSIS — R00.2 PALPITATIONS: ICD-10-CM

## 2018-05-01 DIAGNOSIS — I10 ESSENTIAL HYPERTENSION: Primary | ICD-10-CM

## 2018-05-01 DIAGNOSIS — E78.5 DYSLIPIDEMIA: ICD-10-CM

## 2018-05-01 PROCEDURE — 99214 OFFICE O/P EST MOD 30 MIN: CPT | Performed by: NURSE PRACTITIONER

## 2018-06-28 RX ORDER — HYDROCHLOROTHIAZIDE 25 MG/1
TABLET ORAL
Qty: 90 TABLET | Refills: 2 | Status: SHIPPED | OUTPATIENT
Start: 2018-06-28 | End: 2018-07-27 | Stop reason: SDUPTHER

## 2018-07-27 RX ORDER — HYDROCHLOROTHIAZIDE 25 MG/1
25 TABLET ORAL DAILY
Qty: 90 TABLET | Refills: 3 | Status: SHIPPED | OUTPATIENT
Start: 2018-07-27 | End: 2019-08-02 | Stop reason: SDUPTHER

## 2018-07-27 NOTE — TELEPHONE ENCOUNTER
Requested Prescriptions     Pending Prescriptions Disp Refills    hydrochlorothiazide (HYDRODIURIL) 25 MG tablet 90 tablet 3     Sig: Take 1 tablet by mouth daily         Last ov:5/1/18    Next ov:11/5/18

## 2018-07-27 NOTE — TELEPHONE ENCOUNTER
From: Inga Kumar  Sent: 7/27/2018 12:01 PM EDT  Subject: Medication Renewal Request    Nan Sandifer L. Bleser would like a refill of the following medications:     hydrochlorothiazide (HYDRODIURIL) 25 MG tablet ADAN Burger - CNP]    Preferred pharmacy: [address] [place] [address] , [address] [facility] [phone] Antonio Bernal [phone]        Medication renewals requested in this message routed separately:     rosuvastatin (CRESTOR) 10 MG tablet MERVAT Rodas

## 2018-07-30 DIAGNOSIS — E10.9 TYPE 1 DIABETES MELLITUS ON INSULIN THERAPY (HCC): ICD-10-CM

## 2018-07-30 RX ORDER — ROSUVASTATIN CALCIUM 10 MG/1
10 TABLET, COATED ORAL NIGHTLY
Qty: 90 TABLET | Refills: 2 | Status: SHIPPED | OUTPATIENT
Start: 2018-07-30 | End: 2018-11-05 | Stop reason: SDUPTHER

## 2018-09-13 ENCOUNTER — TELEPHONE (OUTPATIENT)
Dept: FAMILY MEDICINE CLINIC | Age: 53
End: 2018-09-13

## 2018-09-13 NOTE — TELEPHONE ENCOUNTER
Please inform pt that Knee Arthroscopy/Surg is approved 9/7/18 to 3/5/19 @ [facility]. Also insurance is showing Dr Latasha Prater as PCP. Please have pt change this as Dr Yimi Sunshine is her PCP. See attached document.

## 2018-09-14 ENCOUNTER — OFFICE VISIT (OUTPATIENT)
Dept: FAMILY MEDICINE CLINIC | Age: 53
End: 2018-09-14

## 2018-09-14 VITALS
RESPIRATION RATE: 16 BRPM | DIASTOLIC BLOOD PRESSURE: 64 MMHG | HEART RATE: 81 BPM | SYSTOLIC BLOOD PRESSURE: 133 MMHG | WEIGHT: 221.4 LBS | OXYGEN SATURATION: 93 % | BODY MASS INDEX: 41.8 KG/M2 | HEIGHT: 61 IN

## 2018-09-14 DIAGNOSIS — Z01.818 PRE-OP EXAM: Primary | ICD-10-CM

## 2018-09-14 DIAGNOSIS — R42 VERTIGO: ICD-10-CM

## 2018-09-14 DIAGNOSIS — E10.9 TYPE 1 DIABETES MELLITUS ON INSULIN THERAPY (HCC): ICD-10-CM

## 2018-09-14 PROCEDURE — 93000 ELECTROCARDIOGRAM COMPLETE: CPT | Performed by: FAMILY MEDICINE

## 2018-09-14 PROCEDURE — 99242 OFF/OP CONSLTJ NEW/EST SF 20: CPT | Performed by: FAMILY MEDICINE

## 2018-09-14 RX ORDER — BENAZEPRIL HYDROCHLORIDE 40 MG/1
40 TABLET, FILM COATED ORAL DAILY
Qty: 30 TABLET | Refills: 5 | Status: SHIPPED | OUTPATIENT
Start: 2018-09-14 | End: 2018-09-14 | Stop reason: SDUPTHER

## 2018-09-14 RX ORDER — BENAZEPRIL HYDROCHLORIDE 40 MG/1
40 TABLET, FILM COATED ORAL DAILY
Qty: 90 TABLET | Refills: 3 | Status: SHIPPED | OUTPATIENT
Start: 2018-09-14 | End: 2020-01-01 | Stop reason: SDUPTHER

## 2018-09-14 RX ORDER — DIAZEPAM 5 MG/1
TABLET ORAL
Qty: 30 TABLET | Refills: 0 | Status: SHIPPED | OUTPATIENT
Start: 2018-09-14 | End: 2018-10-14

## 2018-09-14 ASSESSMENT — PATIENT HEALTH QUESTIONNAIRE - PHQ9
SUM OF ALL RESPONSES TO PHQ QUESTIONS 1-9: 0
SUM OF ALL RESPONSES TO PHQ QUESTIONS 1-9: 0
2. FEELING DOWN, DEPRESSED OR HOPELESS: 0
1. LITTLE INTEREST OR PLEASURE IN DOING THINGS: 0
SUM OF ALL RESPONSES TO PHQ9 QUESTIONS 1 & 2: 0

## 2018-09-14 NOTE — PROGRESS NOTES
who plans on performing right knee arthroscopy on September 27 at [facility]. The current problem began March 16, 2019, and symptoms have been unchanged with time. Conservative therapy: Yes: prescription medication and physical therapy and MRI revealed torn meniscus, which has been not very effective. .    Planned anesthesia: General   Known anesthesia problems: severe nausea with anesthesia. Bleeding risk: No recent or remote history of abnormal bleeding  Personal or FH of DVT/PE: Yes - FHx of patient's mother with known PE and DVT. Severiano Mcbride has not had a DVT.     Patient objection to receiving blood products: No    Patient Active Problem List   Diagnosis    History of carpal tunnel syndrome    HYPERLIPIDEMIA    Type 1 diabetes mellitus ([address] -- diagnosed as 7year-old    Routine general medical examination at a health care facility    Lumbar disc disease    Palpitations    Arthritis, hip    Shoulder pain, bilateral    Murmur    Edema    Vertigo    BMI 40.0-44.9, adult ([address]    Trigger middle finger of left hand    Type 1 diabetes mellitus on insulin therapy ([address]    Dyslipidemia    Diastolic dysfunction without heart failure       Past Medical History:   Diagnosis Date    Acute upper respiratory infections of unspecified site 5/11/2010    Anxiety state, unspecified 5/11/2010    Arthritis     Carpal tunnel syndrome 5/11/2010    Ingrowing nail 5/11/2010    Other and unspecified hyperlipidemia 5/11/2010    Pain in joint, pelvic region and thigh 5/11/2010    PONV (postoperative nausea and vomiting)     Screening 5/13/2010    Type I (juvenile type) diabetes mellitus without mention of complication, not stated as uncontrolled 5/11/2010    Unspecified arthropathy, ankle and foot 5/11/2010    Unspecified essential hypertension 5/11/2010    Urinary tract infection, site not specified 5/11/2010    Wrist fracture     left     Past Surgical History:   Procedure Laterality Date    CARPAL TUNNEL RELEASE Bilateral      SECTION      FINGER TRIGGER RELEASE      3 of them    SHOULDER ARTHROSCOPY Right 2018    RIGHT SHOULDER ARTHROSCOPY, DEBRIDEMENT, DECOMPRESSION WITH    TUBAL LIGATION      WRIST SURGERY  3/08     wrist fracture     Family History   Problem Relation Age of Onset    Asthma Mother     Other Mother         history of blood clots    Cancer Paternal Grandmother         throat    Birth Defects Neg Hx     Depression Neg Hx     Early Death Neg Hx     High Blood Pressure Neg Hx      Social History     Social History    Marital status:      Spouse name: N/A    Number of children: 3    Years of education: N/A     Occupational History    stay at home Mom      Social History Main Topics    Smoking status: Never Smoker    Smokeless tobacco: Never Used    Alcohol use No    Drug use: No    Sexual activity: Not on file     Other Topics Concern    Not on file     Social History Narrative    No narrative on file       Review of Systems  A comprehensive review of systems was negative except for what was noted in the HPI. Physical Exam   Constitutional: She is oriented to person, place, and time. She appears well-developed and well-nourished. No distress. HENT:   Head: Normocephalic and atraumatic. Mouth/Throat: Uvula is midline, oropharynx is clear and moist and mucous membranes are normal.   Eyes: Conjunctivae and EOM are normal. Pupils are equal, round, and reactive to light. Neck: Trachea normal and normal range of motion. Neck supple. No JVD present. Carotid bruit is not present. No mass and no thyromegaly present. Cardiovascular: Normal rate, regular rhythm, normal heart sounds and intact distal pulses. Exam reveals no gallop and no friction rub. No murmur heard. Pulmonary/Chest: Effort normal and breath sounds normal. No respiratory distress. She has no wheezes. She has no rales. Abdominal: Soft.  Normal aorta and bowel sounds are normal. She

## 2018-09-14 NOTE — PATIENT INSTRUCTIONS
1) Keep the Pre-admission testing appointment to see if you need bloodwork or EKG done. 2) Heart testing earlier this year showed stable findings. Your PCP is fine with you proceed with surgery at this time.

## 2018-09-26 ENCOUNTER — TELEPHONE (OUTPATIENT)
Dept: CARDIOLOGY CLINIC | Age: 53
End: 2018-09-26

## 2018-11-05 ENCOUNTER — OFFICE VISIT (OUTPATIENT)
Dept: CARDIOLOGY CLINIC | Age: 53
End: 2018-11-05
Payer: OTHER GOVERNMENT

## 2018-11-05 VITALS
HEIGHT: 61 IN | WEIGHT: 223.6 LBS | SYSTOLIC BLOOD PRESSURE: 130 MMHG | BODY MASS INDEX: 42.21 KG/M2 | DIASTOLIC BLOOD PRESSURE: 68 MMHG

## 2018-11-05 DIAGNOSIS — I49.1 PAC (PREMATURE ATRIAL CONTRACTION): ICD-10-CM

## 2018-11-05 DIAGNOSIS — E10.9 TYPE 1 DIABETES MELLITUS ON INSULIN THERAPY (HCC): ICD-10-CM

## 2018-11-05 DIAGNOSIS — R00.2 PALPITATION: Primary | ICD-10-CM

## 2018-11-05 DIAGNOSIS — I49.3 PVC (PREMATURE VENTRICULAR CONTRACTION): ICD-10-CM

## 2018-11-05 PROCEDURE — 99214 OFFICE O/P EST MOD 30 MIN: CPT | Performed by: INTERNAL MEDICINE

## 2018-11-05 RX ORDER — ROSUVASTATIN CALCIUM 10 MG/1
10 TABLET, COATED ORAL NIGHTLY
Qty: 90 TABLET | Refills: 3 | Status: SHIPPED | OUTPATIENT
Start: 2018-11-05 | End: 2019-05-06 | Stop reason: SDUPTHER

## 2018-11-05 NOTE — PROGRESS NOTES
47 y.o. here for f/u on chest pain and HTN. Has palps as well. Has been doing well. Feels heart beating/palps sometimes. Thinks it may be hormonal issue given intermittent periods. No sob. No chest pain at present. Episodes of palps last a few seconds. Happens a couple times per week. No n/v/LH/dizziness. No syncope. Happens when sitting/relaxing. Symptoms are NOT exertional.    No exercise. Had knee surgery on ; did fine with it.     Past Medical History:   Diagnosis Date    Acute upper respiratory infections of unspecified site 2010    Anxiety state, unspecified 2010    Arthritis     Carpal tunnel syndrome 2010    Ingrowing nail 2010    Other and unspecified hyperlipidemia 2010    Pain in joint, pelvic region and thigh 2010    PONV (postoperative nausea and vomiting)     Screening 2010    Type I (juvenile type) diabetes mellitus without mention of complication, not stated as uncontrolled 2010    Unspecified arthropathy, ankle and foot 2010    Unspecified essential hypertension 2010    Urinary tract infection, site not specified 2010    Wrist fracture     left     Past Surgical History:   Procedure Laterality Date    CARPAL TUNNEL RELEASE Bilateral      SECTION      FINGER TRIGGER RELEASE      3 of them    KNEE SURGERY      SHOULDER ARTHROSCOPY Right 2018    RIGHT SHOULDER ARTHROSCOPY, DEBRIDEMENT, DECOMPRESSION WITH    TUBAL LIGATION      WRIST SURGERY  3/08     wrist fracture     Social History   Substance Use Topics    Smoking status: Never Smoker    Smokeless tobacco: Never Used    Alcohol use No     Allergies   Allergen Reactions    Lipitor [Atorvastatin] Other (See Comments)     Joint pain     Family History   Problem Relation Age of Onset    Asthma Mother     Other Mother         history of blood clots    Cancer Paternal Grandmother         throat    Birth Defects Neg Hx     Depression Neg

## 2018-12-07 ENCOUNTER — OFFICE VISIT (OUTPATIENT)
Dept: FAMILY MEDICINE CLINIC | Age: 53
End: 2018-12-07
Payer: OTHER GOVERNMENT

## 2018-12-07 VITALS
WEIGHT: 223.2 LBS | TEMPERATURE: 97.4 F | BODY MASS INDEX: 42.17 KG/M2 | RESPIRATION RATE: 10 BRPM | DIASTOLIC BLOOD PRESSURE: 62 MMHG | HEART RATE: 83 BPM | OXYGEN SATURATION: 95 % | SYSTOLIC BLOOD PRESSURE: 138 MMHG

## 2018-12-07 DIAGNOSIS — M51.9 LUMBAR DISC DISEASE: Primary | ICD-10-CM

## 2018-12-07 DIAGNOSIS — M17.10 ARTHRITIS OF KNEE: ICD-10-CM

## 2018-12-07 DIAGNOSIS — R22.1 LUMP IN NECK: ICD-10-CM

## 2018-12-07 DIAGNOSIS — M16.10 ARTHRITIS, HIP: ICD-10-CM

## 2018-12-07 DIAGNOSIS — E10.9 TYPE 1 DIABETES MELLITUS ON INSULIN THERAPY (HCC): ICD-10-CM

## 2018-12-07 PROCEDURE — 99214 OFFICE O/P EST MOD 30 MIN: CPT | Performed by: FAMILY MEDICINE

## 2018-12-07 RX ORDER — INSULIN GLARGINE 100 [IU]/ML
INJECTION, SOLUTION SUBCUTANEOUS
Qty: 5 VIAL | Refills: 2 | Status: SHIPPED | OUTPATIENT
Start: 2018-12-07 | End: 2020-01-01 | Stop reason: SDUPTHER

## 2018-12-07 RX ORDER — LIDOCAINE 50 MG/G
3 PATCH TOPICAL DAILY
Qty: 60 PATCH | Refills: 3 | Status: SHIPPED | OUTPATIENT
Start: 2018-12-07 | End: 2019-01-06

## 2018-12-10 ENCOUNTER — TELEPHONE (OUTPATIENT)
Dept: FAMILY MEDICINE CLINIC | Age: 53
End: 2018-12-10

## 2018-12-10 NOTE — TELEPHONE ENCOUNTER
Patient's already approved for 4 additional office visits the first half of this year. .    Please contact [address] [facility] and they need correction for 2 reasons:  Referring physician is me not my colleague Dr. Km Archuleta. Please give them current office addresses the [facility] office address was previous location. I am confused as to what  needs from this office.

## 2018-12-11 DIAGNOSIS — E10.9 TYPE 1 DIABETES MELLITUS ON INSULIN THERAPY (HCC): ICD-10-CM

## 2018-12-11 LAB
A/G RATIO: 1.9 (ref 1.1–2.2)
ALBUMIN SERPL-MCNC: 4.5 G/DL (ref 3.4–5)
ALP BLD-CCNC: 102 U/L (ref 40–129)
ALT SERPL-CCNC: 13 U/L (ref 10–40)
ANION GAP SERPL CALCULATED.3IONS-SCNC: 14 MMOL/L (ref 3–16)
AST SERPL-CCNC: 13 U/L (ref 15–37)
BILIRUB SERPL-MCNC: <0.2 MG/DL (ref 0–1)
BUN BLDV-MCNC: 15 MG/DL (ref 7–20)
CALCIUM SERPL-MCNC: 9.7 MG/DL (ref 8.3–10.6)
CHLORIDE BLD-SCNC: 101 MMOL/L (ref 99–110)
CHOLESTEROL, TOTAL: 185 MG/DL (ref 0–199)
CO2: 28 MMOL/L (ref 21–32)
CREAT SERPL-MCNC: 0.7 MG/DL (ref 0.6–1.1)
CREATININE URINE: 54.1 MG/DL (ref 28–259)
GFR AFRICAN AMERICAN: >60
GFR NON-AFRICAN AMERICAN: >60
GLOBULIN: 2.4 G/DL
GLUCOSE BLD-MCNC: 119 MG/DL (ref 70–99)
HDLC SERPL-MCNC: 53 MG/DL (ref 40–60)
LDL CHOLESTEROL CALCULATED: 111 MG/DL
MICROALBUMIN UR-MCNC: <1.2 MG/DL
MICROALBUMIN/CREAT UR-RTO: NORMAL MG/G (ref 0–30)
POTASSIUM SERPL-SCNC: 4.1 MMOL/L (ref 3.5–5.1)
SODIUM BLD-SCNC: 143 MMOL/L (ref 136–145)
TOTAL PROTEIN: 6.9 G/DL (ref 6.4–8.2)
TRIGL SERPL-MCNC: 106 MG/DL (ref 0–150)
VLDLC SERPL CALC-MCNC: 21 MG/DL

## 2018-12-12 LAB
ESTIMATED AVERAGE GLUCOSE: 180 MG/DL
HBA1C MFR BLD: 7.9 %

## 2019-01-09 DIAGNOSIS — E10.9 TYPE 1 DIABETES MELLITUS ON INSULIN THERAPY (HCC): ICD-10-CM

## 2019-01-14 ENCOUNTER — HOSPITAL ENCOUNTER (OUTPATIENT)
Dept: ULTRASOUND IMAGING | Age: 54
Discharge: HOME | End: 2019-01-14
Payer: OTHER GOVERNMENT

## 2019-01-14 DIAGNOSIS — E04.1 THYROID NODULE: ICD-10-CM

## 2019-01-14 PROCEDURE — 76536 US EXAM OF HEAD AND NECK: CPT

## 2019-01-23 ENCOUNTER — OFFICE VISIT (OUTPATIENT)
Dept: ENT CLINIC | Age: 54
End: 2019-01-23
Payer: OTHER GOVERNMENT

## 2019-01-23 VITALS
SYSTOLIC BLOOD PRESSURE: 180 MMHG | WEIGHT: 224.6 LBS | HEIGHT: 61 IN | HEART RATE: 89 BPM | BODY MASS INDEX: 42.41 KG/M2 | DIASTOLIC BLOOD PRESSURE: 84 MMHG

## 2019-01-23 DIAGNOSIS — E04.2 MULTIPLE THYROID NODULES: ICD-10-CM

## 2019-01-23 PROCEDURE — 99243 OFF/OP CNSLTJ NEW/EST LOW 30: CPT | Performed by: OTOLARYNGOLOGY

## 2019-01-23 ASSESSMENT — ENCOUNTER SYMPTOMS
VOICE CHANGE: 0
SHORTNESS OF BREATH: 0
EYE PAIN: 0
NAUSEA: 0
EYE DISCHARGE: 0
APNEA: 0
COLOR CHANGE: 0
BLOOD IN STOOL: 0
CONSTIPATION: 0
BACK PAIN: 0
DIARRHEA: 0
COUGH: 0
CHEST TIGHTNESS: 0
VOMITING: 0
CHOKING: 0
STRIDOR: 0
SORE THROAT: 0
TROUBLE SWALLOWING: 0
SINUS PAIN: 0
WHEEZING: 0
RHINORRHEA: 0
SINUS PRESSURE: 0
FACIAL SWELLING: 0

## 2019-02-25 ENCOUNTER — HOSPITAL ENCOUNTER (OUTPATIENT)
Dept: GENERAL RADIOLOGY | Age: 54
Discharge: HOME | End: 2019-02-25
Payer: OTHER GOVERNMENT

## 2019-02-25 ENCOUNTER — OFFICE VISIT (OUTPATIENT)
Dept: FAMILY MEDICINE CLINIC | Age: 54
End: 2019-02-25
Payer: OTHER GOVERNMENT

## 2019-02-25 ENCOUNTER — HOSPITAL ENCOUNTER (OUTPATIENT)
Age: 54
Discharge: HOME | End: 2019-02-25
Payer: OTHER GOVERNMENT

## 2019-02-25 VITALS
RESPIRATION RATE: 16 BRPM | WEIGHT: 220 LBS | HEART RATE: 90 BPM | BODY MASS INDEX: 41.3 KG/M2 | OXYGEN SATURATION: 97 % | SYSTOLIC BLOOD PRESSURE: 138 MMHG | DIASTOLIC BLOOD PRESSURE: 68 MMHG

## 2019-02-25 DIAGNOSIS — M17.0 BILATERAL PRIMARY OSTEOARTHRITIS OF KNEE: ICD-10-CM

## 2019-02-25 DIAGNOSIS — R22.32 WRIST LUMP, LEFT: ICD-10-CM

## 2019-02-25 DIAGNOSIS — M25.532 LEFT WRIST PAIN: ICD-10-CM

## 2019-02-25 DIAGNOSIS — E10.9 TYPE 1 DIABETES MELLITUS WITHOUT COMPLICATION (HCC): ICD-10-CM

## 2019-02-25 DIAGNOSIS — E10.9 TYPE 1 DIABETES MELLITUS ON INSULIN THERAPY (HCC): ICD-10-CM

## 2019-02-25 DIAGNOSIS — Z98.890 S/P WRIST SURGERY: ICD-10-CM

## 2019-02-25 DIAGNOSIS — M25.532 LEFT WRIST PAIN: Primary | ICD-10-CM

## 2019-02-25 PROCEDURE — 73110 X-RAY EXAM OF WRIST: CPT

## 2019-02-25 PROCEDURE — 99214 OFFICE O/P EST MOD 30 MIN: CPT | Performed by: FAMILY MEDICINE

## 2019-02-25 RX ORDER — FLASH GLUCOSE SCANNING READER
1 EACH MISCELLANEOUS
Qty: 1 DEVICE | Refills: 0 | Status: SHIPPED | OUTPATIENT
Start: 2019-02-25 | End: 2020-09-17

## 2019-02-25 RX ORDER — FLASH GLUCOSE SENSOR
1 KIT MISCELLANEOUS
Qty: 6 EACH | Refills: 3 | Status: SHIPPED | OUTPATIENT
Start: 2019-02-25 | End: 2020-09-17

## 2019-02-25 ASSESSMENT — PATIENT HEALTH QUESTIONNAIRE - PHQ9
SUM OF ALL RESPONSES TO PHQ9 QUESTIONS 1 & 2: 0
1. LITTLE INTEREST OR PLEASURE IN DOING THINGS: 0
2. FEELING DOWN, DEPRESSED OR HOPELESS: 0
SUM OF ALL RESPONSES TO PHQ QUESTIONS 1-9: 0
SUM OF ALL RESPONSES TO PHQ QUESTIONS 1-9: 0

## 2019-02-28 ENCOUNTER — TELEPHONE (OUTPATIENT)
Dept: PAIN MANAGEMENT | Age: 54
End: 2019-02-28

## 2019-03-07 ENCOUNTER — TELEPHONE (OUTPATIENT)
Dept: FAMILY MEDICINE CLINIC | Age: 54
End: 2019-03-07

## 2019-03-07 NOTE — TELEPHONE ENCOUNTER
Scanned APPROVED Referral into Media.     Please inform pt that Referral w/Dr Mott/[facility] has been approved from 2/28/2019-8/26/2019.       See attached referral.

## 2019-03-08 ENCOUNTER — TELEPHONE (OUTPATIENT)
Dept: FAMILY MEDICINE CLINIC | Age: 54
End: 2019-03-08

## 2019-04-10 LAB — DIABETIC RETINOPATHY: POSITIVE

## 2019-04-29 ENCOUNTER — PATIENT MESSAGE (OUTPATIENT)
Dept: FAMILY MEDICINE CLINIC | Age: 54
End: 2019-04-29

## 2019-04-29 DIAGNOSIS — R00.2 PALPITATION: Primary | ICD-10-CM

## 2019-04-29 DIAGNOSIS — R00.2 PALPITATIONS: ICD-10-CM

## 2019-04-29 NOTE — TELEPHONE ENCOUNTER
From: Silvino Louis  To: Cookie Setting, DO  Sent: 2019 3:21 PM EDT  Subject: Non-Urgent Medical Question    I was wondering if you could send in a Referral to [facility] for Dr. Vipul Ventura, my referral has  with him and I have a follow up appointment on May 6. I am sorry for the short notice I did not realize it . If possible could you summit this as a 24 referral if possible, to be sure it will be approved in time. Thank you very much for your time. If you have any question please contact me at [phone].     Thank you    Urszula Avitia

## 2019-04-30 NOTE — TELEPHONE ENCOUNTER
Referral is in Epic. I will ask office staff to help me wth  form under prioity of 24 hours or less.

## 2019-05-01 NOTE — TELEPHONE ENCOUNTER
Referral for Dr Carlos Cummins has been Approved. Services in office only between 4/30/19 and 4/29/20. Please inform pt of the approval.    Document is attached.

## 2019-05-06 ENCOUNTER — OFFICE VISIT (OUTPATIENT)
Dept: CARDIOLOGY CLINIC | Age: 54
End: 2019-05-06
Payer: OTHER GOVERNMENT

## 2019-05-06 VITALS
BODY MASS INDEX: 39.61 KG/M2 | HEART RATE: 86 BPM | DIASTOLIC BLOOD PRESSURE: 70 MMHG | SYSTOLIC BLOOD PRESSURE: 132 MMHG | WEIGHT: 211 LBS | OXYGEN SATURATION: 98 %

## 2019-05-06 DIAGNOSIS — I10 ESSENTIAL HYPERTENSION: ICD-10-CM

## 2019-05-06 DIAGNOSIS — I49.3 PVC (PREMATURE VENTRICULAR CONTRACTION): ICD-10-CM

## 2019-05-06 DIAGNOSIS — E78.5 DYSLIPIDEMIA: ICD-10-CM

## 2019-05-06 DIAGNOSIS — R00.2 PALPITATION: Primary | ICD-10-CM

## 2019-05-06 DIAGNOSIS — E10.9 TYPE 1 DIABETES MELLITUS ON INSULIN THERAPY (HCC): ICD-10-CM

## 2019-05-06 DIAGNOSIS — I49.1 PAC (PREMATURE ATRIAL CONTRACTION): ICD-10-CM

## 2019-05-06 LAB
A/G RATIO: 1.7 (ref 1.1–2.2)
ALBUMIN SERPL-MCNC: 4.3 G/DL (ref 3.4–5)
ALP BLD-CCNC: 99 U/L (ref 40–129)
ALT SERPL-CCNC: 16 U/L (ref 10–40)
ANION GAP SERPL CALCULATED.3IONS-SCNC: 14 MMOL/L (ref 3–16)
AST SERPL-CCNC: 15 U/L (ref 15–37)
BILIRUB SERPL-MCNC: 0.3 MG/DL (ref 0–1)
BUN BLDV-MCNC: 11 MG/DL (ref 7–20)
CALCIUM SERPL-MCNC: 9.6 MG/DL (ref 8.3–10.6)
CHLORIDE BLD-SCNC: 101 MMOL/L (ref 99–110)
CHOLESTEROL, TOTAL: 186 MG/DL (ref 0–199)
CO2: 27 MMOL/L (ref 21–32)
CREAT SERPL-MCNC: 0.6 MG/DL (ref 0.6–1.1)
GFR AFRICAN AMERICAN: >60
GFR NON-AFRICAN AMERICAN: >60
GLOBULIN: 2.6 G/DL
GLUCOSE BLD-MCNC: 166 MG/DL (ref 70–99)
HDLC SERPL-MCNC: 56 MG/DL (ref 40–60)
LDL CHOLESTEROL CALCULATED: 110 MG/DL
POTASSIUM SERPL-SCNC: 4.3 MMOL/L (ref 3.5–5.1)
SODIUM BLD-SCNC: 142 MMOL/L (ref 136–145)
TOTAL PROTEIN: 6.9 G/DL (ref 6.4–8.2)
TRIGL SERPL-MCNC: 102 MG/DL (ref 0–150)
VLDLC SERPL CALC-MCNC: 20 MG/DL

## 2019-05-06 PROCEDURE — 99214 OFFICE O/P EST MOD 30 MIN: CPT | Performed by: INTERNAL MEDICINE

## 2019-05-06 RX ORDER — ROSUVASTATIN CALCIUM 20 MG/1
20 TABLET, COATED ORAL NIGHTLY
Qty: 90 TABLET | Refills: 3 | Status: SHIPPED | OUTPATIENT
Start: 2019-05-06 | End: 2019-08-02 | Stop reason: SDUPTHER

## 2019-05-06 RX ORDER — METOPROLOL SUCCINATE 25 MG/1
25 TABLET, EXTENDED RELEASE ORAL DAILY
Qty: 90 TABLET | Refills: 3 | Status: SHIPPED | OUTPATIENT
Start: 2019-05-06 | End: 2019-08-06 | Stop reason: SDUPTHER

## 2019-05-06 NOTE — PROGRESS NOTES
49 y.o. here for f/u on palps. Has palpitations. No chest pain or tightness but does have \"awareness\" with anxious, stress, or significant relaxation where she feels the palpitations. Every once in awhile has a pounding. No n/v. No exercise. Has steps. No problems walking dog up and down street ([place]). NOTHING bothers her when walking/exercising, in fact that's the time she feels best.    No exercise. Had right knee surgery on 19; did fine with it.     Past Medical History:   Diagnosis Date    Acute upper respiratory infections of unspecified site 2010    Anxiety state, unspecified 2010    Arthritis     Carpal tunnel syndrome 2010    Ingrowing nail 2010    Multiple thyroid nodules 2019    Other and unspecified hyperlipidemia 2010    Pain in joint, pelvic region and thigh 2010    PONV (postoperative nausea and vomiting)     Screening 2010    Type I (juvenile type) diabetes mellitus without mention of complication, not stated as uncontrolled 2010    Unspecified arthropathy, ankle and foot 2010    Unspecified essential hypertension 2010    Urinary tract infection, site not specified 2010    Wrist fracture     left     Past Surgical History:   Procedure Laterality Date    CARPAL TUNNEL RELEASE Bilateral      SECTION      FINGER TRIGGER RELEASE      3 of them    KNEE SURGERY      SHOULDER ARTHROSCOPY Right 2018    RIGHT SHOULDER ARTHROSCOPY, DEBRIDEMENT, DECOMPRESSION WITH    TUBAL LIGATION      WRIST SURGERY  3/08     wrist fracture     Social History     Tobacco Use    Smoking status: Never Smoker    Smokeless tobacco: Never Used   Substance Use Topics    Alcohol use: No    Drug use: No     Allergies   Allergen Reactions    Lipitor [Atorvastatin] Other (See Comments)     Joint pain     Family History   Problem Relation Age of Onset    Asthma Mother    Kayley Loomis Other Mother         history of blood clots    Cancer Paternal Grandmother         throat    Birth Defects Neg Hx     Depression Neg Hx     Early Death Neg Hx     High Blood Pressure Neg Hx      Review of Systems   General: No fevers, chills, fatigue, or night sweats. No abnormal changes in weight. HEENT: No blurry or decreased vision. No changes in hearing, nasal discharge or sore throat. Cardiovascular: See HPI. No cramping in legs or buttocks when walking. Respiratory: No cough, hemoptysis, or wheezing. No history of asthma. Gastrointestinal: No abdominal pain, hematochezia, melana, or history of GI ulcers. Genito-Urinary: No dysuria or hematuria. No urgency or polyuria. Musculoskeletal: Knee pain   Neurological: No dizziness or headaches. No numbness/tingling, speech problems or weakness. No history of a stroke or TIA. Psychological: No anxiety or depression  Hematological and Lymphatic: No abnormal bleeding or bruising, blood clots, jaundice. Endocrine: No malaise/lethargy, palpitations, polydipsia/polyuria, temperature intolerance or unexpected weight changes. Skin: No rashes or non-healing ulcers. PE:  Blood pressure 132/70, pulse 86, weight 211 lb (95.7 kg), SpO2 98 %, not currently breastfeeding. General (appearance): Well devel. No distress. Eyes: anicteric. eomi  Neck: no jvd  Ears/Nose/Mouth/Thorat: No cyanosis  CV: RRR. No m/r/g   Respiratory:  Clear B, normal respiratory effort  GI: Abd soft. No peritoneal signs  Skin: Warm, dry. No rashes  Neuro/Psych: Alert and oriented x 3. Appropriate behavior  Ext:  No c/c. No edema  Pulses:  2+ carotid.  No bruits      Lab Results   Component Value Date    WBC 5.3 09/18/2018    HGB 13.6 09/18/2018    HCT 39.3 09/18/2018    MCV 89.5 09/18/2018     09/18/2018     Lab Results   Component Value Date     12/11/2018    K 4.1 12/11/2018     12/11/2018    CO2 28 12/11/2018    BUN 15 12/11/2018    CREATININE 0.7 12/11/2018    GLUCOSE 119 (H) 12/11/2018 CALCIUM 9.7 12/11/2018    PROT 6.9 12/11/2018    LABALBU 4.5 12/11/2018    BILITOT <0.2 12/11/2018    ALKPHOS 102 12/11/2018    AST 13 (L) 12/11/2018    ALT 13 12/11/2018    LABGLOM >60 12/11/2018    GFRAA >60 12/11/2018    AGRATIO 1.9 12/11/2018    GLOB 2.4 12/11/2018     Lab Results   Component Value Date    CHOL 185 12/11/2018    CHOL 182 12/29/2017    CHOL 305 (H) 05/04/2017     Lab Results   Component Value Date    TRIG 106 12/11/2018    TRIG 114 12/29/2017    TRIG 124 05/04/2017     Lab Results   Component Value Date    HDL 53 12/11/2018    HDL 55 12/29/2017    HDL 55 05/04/2017     Lab Results   Component Value Date    LDLCALC 111 (H) 12/11/2018    LDLCALC 104 (H) 12/29/2017    LDLCALC 225 (H) 05/04/2017     Lab Results   Component Value Date    LABVLDL 21 12/11/2018    LABVLDL 23 12/29/2017    LABVLDL 25 05/04/2017     No results found for: CHOLHDLRATIO    2/2018 Nuc stress: Normal    2/2018 TTE:  Definity contrast administered. Left ventricular cavity size is normal.   There is mild concentric left ventricular hypertrophy. Overall left   ventricular systolic function appears normal with an estimated ejection   fraction of 55-60%. No gross regional wall motion abnormalities are seen.   Diastolic filling parameters suggest grade II diastolic dysfunction. Mitral   annular calcification is present. Right ventricular systolic function   appears to be preserved. TAPSE measures 1.53 cm. Current Outpatient Medications:     Handicap Placard MISC, by Does not apply route Dx of bilateral knee osteoarthritis.   Effective Feb 25, 2019 until Feb 25, 2025., Disp: 1 each, Rfl: 0    Continuous Blood Gluc  (FREESTYLE GREG 14 DAY READER) PHILLIP, 1 each by Does not apply route 4 times daily (before meals and nightly), Disp: 1 Device, Rfl: 0    Continuous Blood Gluc Sensor (FREESTYLE GREG 14 DAY SENSOR) MISC, 1 each by Does not apply route 4 times daily (before meals and nightly), Disp: 6 each, Rfl: 3    blood

## 2019-05-07 LAB
ESTIMATED AVERAGE GLUCOSE: 188.6 MG/DL
HBA1C MFR BLD: 8.2 %

## 2019-05-14 ENCOUNTER — TELEPHONE (OUTPATIENT)
Dept: CARDIOLOGY CLINIC | Age: 54
End: 2019-05-14

## 2019-05-14 NOTE — TELEPHONE ENCOUNTER
Ms. Chitra Patrizia called to find out if she should be taking her beta blocker in the am or the pm.  She takes Metoprolol 25 mg XR for palpitations and reports she mostly gets these symptoms in the evening when she is at rest.      After discussing with a RN, I let her know it is ok to take it int he evening if she prefers. The important thing is that she take it around the same time everyday and that she monitors her HR. She expressed understanding.

## 2019-05-23 ENCOUNTER — TELEPHONE (OUTPATIENT)
Dept: FAMILY MEDICINE CLINIC | Age: 54
End: 2019-05-23

## 2019-05-28 ENCOUNTER — NURSE TRIAGE (OUTPATIENT)
Dept: OTHER | Facility: CLINIC | Age: 54
End: 2019-05-28

## 2019-05-28 NOTE — TELEPHONE ENCOUNTER
Reason for Disposition   Patient wants to be seen    Protocols used: LEG SWELLING AND EDEMA-ADULT-OH    Received call from [facility] in [address] [place] [address]. Patient has had swelling of right foot for quite some time. It is an on going issue. No swelling in ankle or leg. There is some discomfort from swelling, but no pain or redness. No SOB or chest pain. Call soft transferred to [facility] in [address] [place] [address] to schedule appointment.

## 2019-05-31 ENCOUNTER — OFFICE VISIT (OUTPATIENT)
Dept: FAMILY MEDICINE CLINIC | Age: 54
End: 2019-05-31
Payer: OTHER GOVERNMENT

## 2019-05-31 VITALS
BODY MASS INDEX: 39.61 KG/M2 | RESPIRATION RATE: 16 BRPM | DIASTOLIC BLOOD PRESSURE: 61 MMHG | SYSTOLIC BLOOD PRESSURE: 123 MMHG | HEART RATE: 93 BPM | WEIGHT: 211 LBS | OXYGEN SATURATION: 95 % | TEMPERATURE: 97.4 F

## 2019-05-31 DIAGNOSIS — E10.9 TYPE 1 DIABETES MELLITUS ON INSULIN THERAPY (HCC): ICD-10-CM

## 2019-05-31 DIAGNOSIS — R60.0 LEG EDEMA, RIGHT: Primary | ICD-10-CM

## 2019-05-31 PROCEDURE — 99214 OFFICE O/P EST MOD 30 MIN: CPT | Performed by: FAMILY MEDICINE

## 2019-05-31 RX ORDER — CELECOXIB 200 MG/1
CAPSULE ORAL
Qty: 180 CAPSULE | Refills: 3 | Status: SHIPPED | OUTPATIENT
Start: 2019-05-31 | End: 2020-08-20

## 2019-05-31 RX ORDER — POTASSIUM CHLORIDE 750 MG/1
TABLET, EXTENDED RELEASE ORAL
Qty: 30 TABLET | Refills: 1 | Status: SHIPPED | OUTPATIENT
Start: 2019-05-31 | End: 2019-09-03 | Stop reason: ALTCHOICE

## 2019-05-31 RX ORDER — FUROSEMIDE 20 MG/1
20-40 TABLET ORAL DAILY PRN
Qty: 30 TABLET | Refills: 2 | Status: SHIPPED | OUTPATIENT
Start: 2019-05-31 | End: 2020-09-17

## 2019-05-31 NOTE — PROGRESS NOTES
[facility]  Progress Note  Stephanie Kaplan DO          Ling Murray  1965 06/03/19    Chief Complaint:   Ling Murray is a 49 y.o. female who is here for leg swelling and DM    HPI:     Right foot swelling occurs intermittently. Had VL study ruling out DVT a few years back. Denies shortness of breath. Leg cramping. Requests freestyle angelica. Trying to get ready for orthopedic knee surgery. She is trying to get A1C under 8%. Coninues with insulin with blood sugars premeals in the mid 100s. ROS negative for headache, visionchanges, chest pain, shortness of breath, abdominal pain, urinary sx, bowel changes. Past medical, surgical, and social history reviewed. Medications and allergies reviewed. Allergies   Allergen Reactions    Lipitor [Atorvastatin] Other (See Comments)     Joint pain     Prior to Visit Medications    Medication Sig Taking? Authorizing Provider   blood glucose test strips (PRECISION XTRA TEST STRIPS) strip TEST BLOOD SUGAR 7-8 TIMES A DAY or as directed by physician Yes Paige Aguila, DO   furosemide (LASIX) 20 MG tablet Take 1-2 tablets by mouth daily as needed (leg edema) Yes Paige Aguila DO   potassium chloride (KLOR-CON M) 10 MEQ extended release tablet On days you take furosemide (Lasix) take 1 potassium tablet for every tablet of Lasix. Yes Paige Aguila, DO   celecoxib (CELEBREX) 200 MG capsule Take 1 capsule daily. On days of severe pain, may take 1 capsule BID. Yes Paige Aguila DO   metoprolol succinate (TOPROL XL) 25 MG extended release tablet Take 1 tablet by mouth daily Yes Leonard Pepe MD   rosuvastatin (CRESTOR) 20 MG tablet Take 1 tablet by mouth nightly Yes Leonard Pepe MD   Handicap Placard MISC by Does not apply route Dx of bilateral knee osteoarthritis. Effective Feb 25, 2019 until Feb 25, 2025.  Yes Paige Aguila DO   Continuous Blood Gluc  (FREESTYLE ANGELICA 14 DAY READER) PHILLIP 1 each by Does not apply route 4 times daily (before meals and nightly) Yes Indira Aguila, DO   Continuous Blood Gluc Sensor (FREESTYLE GREG 14 DAY SENSOR) MISC 1 each by Does not apply route 4 times daily (before meals and nightly) Yes Indira Aguila, DO   Insulin Syringe-Needle U-100 (B-D INS SYRINGE 0.5CC/30GX1/2\") 30G X 1/2\" 0.5 ML MISC USE TO INJECT UNDER THE SKIN 6 TIMES DAILY Yes Yohannes Aguila, DO   insulin glargine (LANTUS) 100 UNIT/ML injection vial INJECT 43 UNITS INTO THE SKIN NIGHTLY Yes Indira Aguila, DO   insulin lispro (HUMALOG) 100 UNIT/ML injection vial Sliding scale Yes Yohannes Aguila, DO   benazepril (LOTENSIN) 40 MG tablet Take 1 tablet by mouth daily Yes Indira Aguila, DO   hydrochlorothiazide (HYDRODIURIL) 25 MG tablet Take 1 tablet by mouth daily Yes ADAN Carmona - CNP   aspirin 81 MG EC tablet Take 81 mg by mouth daily.  Yes Historical Provider, MD          Vitals:    05/31/19 1541   BP: 123/61   Pulse: 93   Resp: 16   Temp: 97.4 °F (36.3 °C)   TempSrc: Oral   SpO2: 95%   Weight: 211 lb (95.7 kg)      Wt Readings from Last 3 Encounters:   05/31/19 211 lb (95.7 kg)   05/06/19 211 lb (95.7 kg)   02/25/19 220 lb (99.8 kg)     BP Readings from Last 3 Encounters:   05/31/19 123/61   05/06/19 132/70   02/25/19 138/68       Patient Active Problem List   Diagnosis    History of carpal tunnel syndrome    HYPERLIPIDEMIA    Type 1 diabetes mellitus ([facility] -- diagnosed as 7year-old    Lumbar disc disease    Palpitations    Arthritis, hip    Shoulder pain, bilateral    Murmur    Edema    Vertigo    BMI 40.0-44.9, adult ([facility]    Trigger middle finger of left hand    Type 1 diabetes mellitus on insulin therapy ([facility]    Dyslipidemia    Diastolic dysfunction without heart failure    Multiple thyroid nodules       Immunization History   Administered Date(s) Administered    Hepatitis A 09/26/2013    Influenza Vaccine, unspecified formulation 10/31/2011    Influenza Virus Vaccine 2009, 10/31/2011, 2013, 2016    Influenza, Nicole Daniela, 3 yrs and older, IM, PF (Fluzone 3 yrs and older or Afluria 5 yrs and older) 2016       Past Medical History:   Diagnosis Date    Acute upper respiratory infections of unspecified site 2010    Anxiety state, unspecified 2010    Arthritis     Carpal tunnel syndrome 2010    Ingrowing nail 2010    Multiple thyroid nodules 2019    Other and unspecified hyperlipidemia 2010    Pain in joint, pelvic region and thigh 2010    PONV (postoperative nausea and vomiting)     Screening 2010    Type I (juvenile type) diabetes mellitus without mention of complication, not stated as uncontrolled 2010    Unspecified arthropathy, ankle and foot 2010    Unspecified essential hypertension 2010    Urinary tract infection, site not specified 2010    Wrist fracture     left     Past Surgical History:   Procedure Laterality Date    CARPAL TUNNEL RELEASE Bilateral      SECTION      FINGER TRIGGER RELEASE      3 of them    KNEE SURGERY      SHOULDER ARTHROSCOPY Right 2018    RIGHT SHOULDER ARTHROSCOPY, DEBRIDEMENT, DECOMPRESSION WITH    TUBAL LIGATION      WRIST SURGERY  3/08     wrist fracture     Family History   Problem Relation Age of Onset    Asthma Mother     Other Mother         history of blood clots    Cancer Paternal Grandmother         throat    Birth Defects Neg Hx     Depression Neg Hx     Early Death Neg Hx     High Blood Pressure Neg Hx      Social History     Socioeconomic History    Marital status:      Spouse name: Not on file    Number of children: 3    Years of education: Not on file    Highest education level: Not on file   Occupational History    Occupation: stay at home Mom   Social Needs    Financial resource strain: Not on file    Food insecurity:     Worry: Not on file     Inability: Not on file    Transportation needs:     Medical: Not on file     Non-medical: Not on file   Tobacco Use    Smoking status: Never Smoker    Smokeless tobacco: Never Used   Substance and Sexual Activity    Alcohol use: No    Drug use: No    Sexual activity: Not on file   Lifestyle    Physical activity:     Days per week: Not on file     Minutes per session: Not on file    Stress: Not on file   Relationships    Social connections:     Talks on phone: Not on file     Gets together: Not on file     Attends Gnosticism service: Not on file     Active member of club or organization: Not on file     Attends meetings of clubs or organizations: Not on file     Relationship status: Not on file    Intimate partner violence:     Fear of current or ex partner: Not on file     Emotionally abused: Not on file     Physically abused: Not on file     Forced sexual activity: Not on file   Other Topics Concern    Not on file   Social History Narrative    Not on file       O: /61   Pulse 93   Temp 97.4 °F (36.3 °C) (Oral)   Resp 16   Wt 211 lb (95.7 kg)   SpO2 95%   Breastfeeding? No   BMI 39.61 kg/m²   Physical Exam  GEN: No acute distress,cooperative, well nourished, alert. HEENT: PEERLA, EOMI , normocephalic/atraumatic, nares and oropharynx clear. Mucus membranes normal, Tympanic membranes clear bilaterally. Neck: soft, supple, no thyromegaly,mass, no Lymphadenopathy  CV: Regular rate and rhythm, no murmur, rubs, gallops. 1+ right foot edema. Resp: Clear to auscultation bilaterally good air entry bilaterally  No crackles, wheeze. Breathing comfortably. Psych:normal affect. Neuro: AOx3      ASSESSMENT   Diagnosis Orders   1. Leg edema, right  VL DUP LOWER EXTREMITY VENOUS RIGHT   2. Type 1 diabetes mellitus on insulin therapy (HCC)  blood glucose test strips (PRECISION XTRA TEST STRIPS) strip    HEMOGLOBIN A1C    COMPREHENSIVE METABOLIC PANEL     #1: r/o DVT.  Concerns is not

## 2019-05-31 NOTE — TELEPHONE ENCOUNTER
See telephone encounter 2.28.19    A PA was initiated for this  Submitted PA for FreeStyle [place] 14 Day Sensor XX [facility]: [facility] - Rx #: [id number],  Via CMM. This medication may be excluded from the patient's benefit. For more information, please reach out to [facility] directly at [phone]. Consuello Risk is excluded form pt's benefit    Do you want to file an appeal?  Do you want pt to call her insurance company to see what is under her covered benefit?

## 2019-05-31 NOTE — TELEPHONE ENCOUNTER
Patient requires 4 injections of insulin to include 3 injections of Humalog before meals and 1 injection of Lantus. She is attempting to improve her diabetes control as she gets ready for a right knee replacement. She would benefit from having the [employer] system. 1):Please call 1 800 number to see next steps if there is a place for us to fax an appeal letter.     2) Ultimately if the appeal for approval is not accepted then since [facility] has the reader and sensor on file she can ask out of pocket cost.

## 2019-05-31 NOTE — PATIENT INSTRUCTIONS
1) Get labwork repeat in July or early August.  2) Take the Lasix and potassium on days of worse leg swelling. 3) You can increase the Celebrex to 2 a day on days of significant pain. 4) Call [phone] to schedule the leg doppler (ultrasound).

## 2019-06-03 NOTE — TELEPHONE ENCOUNTER
Called the 800 number and that was not the correct number - the number is [phone]  Spoke to Carroll Grewal at [employer] and this is not covered and no appeal can be done. It is not covered.

## 2019-06-12 ENCOUNTER — HOSPITAL ENCOUNTER (OUTPATIENT)
Dept: VASCULAR LAB | Age: 54
Discharge: HOME | End: 2019-06-12
Payer: OTHER GOVERNMENT

## 2019-06-12 DIAGNOSIS — R60.0 LEG EDEMA, RIGHT: ICD-10-CM

## 2019-06-12 PROCEDURE — 93971 EXTREMITY STUDY: CPT

## 2019-06-29 DIAGNOSIS — E10.9 TYPE 1 DIABETES MELLITUS ON INSULIN THERAPY (HCC): ICD-10-CM

## 2019-07-16 ENCOUNTER — PATIENT MESSAGE (OUTPATIENT)
Dept: FAMILY MEDICINE CLINIC | Age: 54
End: 2019-07-16

## 2019-07-16 RX ORDER — FLUCONAZOLE 150 MG/1
150 TABLET ORAL
Qty: 4 TABLET | Refills: 0 | Status: SHIPPED | OUTPATIENT
Start: 2019-07-16 | End: 2019-10-15 | Stop reason: SDUPTHER

## 2019-07-16 NOTE — TELEPHONE ENCOUNTER
From: Myron Serrano  To: Kacey Tirado DO  Sent: 7/16/2019 3:59 AM EDT  Subject: Prescription Question    When I was on Vacation last week in [place] I unfortunately I got a UTI, went to ER and was given Cephalexin 500mg. I then of course started with a Yeast Infection, I did have a yeast pill from previous time and took it but I am experiencing symptoms of yeast infection still. Would it be possible for you to please call in another prescription to my [facility]. If you have any questions my contact number is [phone]. Thank you very much.     Felicity Hernandez

## 2019-07-23 ENCOUNTER — OFFICE VISIT (OUTPATIENT)
Dept: ENT CLINIC | Age: 54
End: 2019-07-23
Payer: OTHER GOVERNMENT

## 2019-07-23 VITALS
DIASTOLIC BLOOD PRESSURE: 67 MMHG | TEMPERATURE: 97.9 F | SYSTOLIC BLOOD PRESSURE: 125 MMHG | HEART RATE: 86 BPM | HEIGHT: 61 IN | BODY MASS INDEX: 39.27 KG/M2 | WEIGHT: 208 LBS

## 2019-07-23 DIAGNOSIS — E04.2 MULTIPLE THYROID NODULES: Primary | ICD-10-CM

## 2019-07-23 PROCEDURE — 76536 US EXAM OF HEAD AND NECK: CPT | Performed by: OTOLARYNGOLOGY

## 2019-08-02 DIAGNOSIS — E10.9 TYPE 1 DIABETES MELLITUS ON INSULIN THERAPY (HCC): ICD-10-CM

## 2019-08-02 RX ORDER — HYDROCHLOROTHIAZIDE 25 MG/1
25 TABLET ORAL DAILY
Qty: 90 TABLET | Refills: 3 | Status: SHIPPED | OUTPATIENT
Start: 2019-08-02 | End: 2019-09-03

## 2019-08-02 NOTE — TELEPHONE ENCOUNTER
[facility] Medication Refills:                Last Office Visit5/6/19Next Office Visit: 8//6/19  Last Refill:7/27/18  Last Labs:5/6/19

## 2019-08-05 ENCOUNTER — TELEPHONE (OUTPATIENT)
Dept: CARDIOLOGY CLINIC | Age: 54
End: 2019-08-05

## 2019-08-05 DIAGNOSIS — E78.5 DYSLIPIDEMIA: ICD-10-CM

## 2019-08-05 DIAGNOSIS — E10.9 TYPE 1 DIABETES MELLITUS ON INSULIN THERAPY (HCC): ICD-10-CM

## 2019-08-05 DIAGNOSIS — E78.5 DYSLIPIDEMIA: Primary | ICD-10-CM

## 2019-08-05 LAB
A/G RATIO: 2.1 (ref 1.1–2.2)
ALBUMIN SERPL-MCNC: 4.6 G/DL (ref 3.4–5)
ALP BLD-CCNC: 80 U/L (ref 40–129)
ALT SERPL-CCNC: 16 U/L (ref 10–40)
ANION GAP SERPL CALCULATED.3IONS-SCNC: 15 MMOL/L (ref 3–16)
AST SERPL-CCNC: 17 U/L (ref 15–37)
BILIRUB SERPL-MCNC: 0.5 MG/DL (ref 0–1)
BUN BLDV-MCNC: 14 MG/DL (ref 7–20)
CALCIUM SERPL-MCNC: 10.1 MG/DL (ref 8.3–10.6)
CHLORIDE BLD-SCNC: 99 MMOL/L (ref 99–110)
CHOLESTEROL, TOTAL: 185 MG/DL (ref 0–199)
CO2: 26 MMOL/L (ref 21–32)
CREAT SERPL-MCNC: 0.8 MG/DL (ref 0.6–1.1)
GFR AFRICAN AMERICAN: >60
GFR NON-AFRICAN AMERICAN: >60
GLOBULIN: 2.2 G/DL
GLUCOSE BLD-MCNC: 209 MG/DL (ref 70–99)
HDLC SERPL-MCNC: 57 MG/DL (ref 40–60)
LDL CHOLESTEROL CALCULATED: 107 MG/DL
POTASSIUM SERPL-SCNC: 4.2 MMOL/L (ref 3.5–5.1)
SODIUM BLD-SCNC: 140 MMOL/L (ref 136–145)
TOTAL PROTEIN: 6.8 G/DL (ref 6.4–8.2)
TRIGL SERPL-MCNC: 104 MG/DL (ref 0–150)
VLDLC SERPL CALC-MCNC: 21 MG/DL

## 2019-08-05 RX ORDER — ROSUVASTATIN CALCIUM 20 MG/1
20 TABLET, COATED ORAL NIGHTLY
Qty: 90 TABLET | Refills: 3 | Status: SHIPPED | OUTPATIENT
Start: 2019-08-05 | End: 2020-09-27 | Stop reason: SDUPTHER

## 2019-08-06 ENCOUNTER — OFFICE VISIT (OUTPATIENT)
Dept: CARDIOLOGY CLINIC | Age: 54
End: 2019-08-06
Payer: OTHER GOVERNMENT

## 2019-08-06 VITALS
DIASTOLIC BLOOD PRESSURE: 66 MMHG | HEART RATE: 96 BPM | WEIGHT: 209.6 LBS | BODY MASS INDEX: 39.6 KG/M2 | SYSTOLIC BLOOD PRESSURE: 112 MMHG

## 2019-08-06 DIAGNOSIS — I10 ESSENTIAL HYPERTENSION: ICD-10-CM

## 2019-08-06 DIAGNOSIS — R00.2 PALPITATION: Primary | ICD-10-CM

## 2019-08-06 DIAGNOSIS — I49.1 PAC (PREMATURE ATRIAL CONTRACTION): ICD-10-CM

## 2019-08-06 DIAGNOSIS — I49.3 PVC (PREMATURE VENTRICULAR CONTRACTION): ICD-10-CM

## 2019-08-06 DIAGNOSIS — E78.5 DYSLIPIDEMIA: ICD-10-CM

## 2019-08-06 LAB
ESTIMATED AVERAGE GLUCOSE: 171.4 MG/DL
HBA1C MFR BLD: 7.6 %

## 2019-08-06 PROCEDURE — 99214 OFFICE O/P EST MOD 30 MIN: CPT | Performed by: NURSE PRACTITIONER

## 2019-08-06 RX ORDER — METOPROLOL SUCCINATE 25 MG/1
25 TABLET, EXTENDED RELEASE ORAL DAILY
Qty: 90 TABLET | Refills: 3 | Status: SHIPPED | OUTPATIENT
Start: 2019-08-06 | End: 2021-09-07 | Stop reason: SDUPTHER

## 2019-08-08 NOTE — PROGRESS NOTES
CC/HPI:    HTN, HLD, Palpitations, PVC/PAC    49 y.o. patient of Dr. Ivelisse Peguero with palpitations, PVC, PAC, HTN and HLD who is here for follow up. She states her palpitations are 75% better. She notices the palpitations mostly in the evening when relaxed or going to bed. She denies cp, sob, LH/dizziness, or syncope. No LE edema, orthopnea or weight gain. No fevers, n/v/d or GI/ bleeding. Tolerating medications. Past Medical History:   Diagnosis Date    Acute upper respiratory infections of unspecified site 2010    Anxiety state, unspecified 2010    Arthritis     Carpal tunnel syndrome 2010    Ingrowing nail 2010    Multiple thyroid nodules 2019    Other and unspecified hyperlipidemia 2010    Pain in joint, pelvic region and thigh 2010    PONV (postoperative nausea and vomiting)     Screening 2010    Type I (juvenile type) diabetes mellitus without mention of complication, not stated as uncontrolled 2010    Unspecified arthropathy, ankle and foot 2010    Unspecified essential hypertension 2010    Urinary tract infection, site not specified 2010    Wrist fracture     left     Past Surgical History:   Procedure Laterality Date    CARPAL TUNNEL RELEASE Bilateral      SECTION      FINGER TRIGGER RELEASE      3 of them    KNEE SURGERY      SHOULDER ARTHROSCOPY Right 2018    RIGHT SHOULDER ARTHROSCOPY, DEBRIDEMENT, DECOMPRESSION WITH    TUBAL LIGATION      WRIST SURGERY  3/08     wrist fracture     Family History   Problem Relation Age of Onset    Asthma Mother     Other Mother         history of blood clots    Cancer Paternal Grandmother         throat    Birth Defects Neg Hx     Depression Neg Hx     Early Death Neg Hx     High Blood Pressure Neg Hx      Social History     Tobacco Use    Smoking status: Never Smoker    Smokeless tobacco: Never Used   Substance Use Topics    Alcohol use: No    Drug use:  No PROFILE:   Lab Results   Component Value Date    ALT 16 2019    AST 17 2019    ALKPHOS 80 2019    BILITOT 0.5 2019     PT/INR:   Lab Results   Component Value Date    INR 1.0 2018    INR 0.89 2017    PROTIME 10.9 2018    PROTIME 10.1 2017     BNP:    Lab Results   Component Value Date    BNP 22.0 2018     LIPIDS:  No components found for: CHLPL  Lab Results   Component Value Date    TRIG 104 2019    TRIG 102 2019    TRIG 106 2018     Lab Results   Component Value Date    HDL 57 2019    HDL 56 2019    HDL 53 2018     Lab Results   Component Value Date    LDLCALC 107 (H) 2019    LDLCALC 110 (H) 2019    LDLCALC 111 (H) 2018     Lab Results   Component Value Date    LABVLDL 21 2019    LABVLDL 20 2019    LABVLDL 21 2018     TSH:  Lab Results   Component Value Date    TSH 4.19 2013       IMAGIN2018 Nuc stress:     Normal myocardial perfusion scan       Ejection fraction 80 %      2018 Echo:   Definity contrast administered. Left ventricular cavity size is normal.   There is mild concentric left ventricular hypertrophy. Overall left   ventricular systolic function appears normal with an estimated ejection   fraction of 55-60%. No gross regional wall motion abnormalities are seen.   Diastolic filling parameters suggest grade II diastolic dysfunction. Mitral   annular calcification is present. Right ventricular systolic function   appears to be preserved. TAPSE measures 1.53 cm.    3/2018 Holter.  PAC, PVCs     Medications:   Current Outpatient Medications   Medication Sig Dispense Refill    metoprolol succinate (TOPROL XL) 25 MG extended release tablet Take 1 tablet by mouth daily 90 tablet 3    rosuvastatin (CRESTOR) 20 MG tablet Take 1 tablet by mouth nightly 90 tablet 3    hydrochlorothiazide (HYDRODIURIL) 25 MG tablet Take 1 tablet by mouth daily 90 tablet 3    fluconazole (DIFLUCAN) 150 MG tablet Take 1 tablet by mouth every 72 hours as needed (yeast infection) 4 tablet 0    Insulin Syringe-Needle U-100 (B-D INS SYRINGE 0.5CC/30GX1/2\") 30G X 1/2\" 0.5 ML MISC USE TO INJECT UNDER THE SKIN 6 TIMES DAILY 54 each 5    blood glucose test strips (PRECISION XTRA TEST STRIPS) strip TEST BLOOD SUGAR 7-8 TIMES A DAY or as directed by physician 800 strip 3    furosemide (LASIX) 20 MG tablet Take 1-2 tablets by mouth daily as needed (leg edema) 30 tablet 2    potassium chloride (KLOR-CON M) 10 MEQ extended release tablet On days you take furosemide (Lasix) take 1 potassium tablet for every tablet of Lasix. 30 tablet 1    celecoxib (CELEBREX) 200 MG capsule Take 1 capsule daily. On days of severe pain, may take 1 capsule BID. 180 capsule 3    Handicap Placard MISC by Does not apply route Dx of bilateral knee osteoarthritis. Effective Feb 25, 2019 until Feb 25, 2025. 1 each 0    Continuous Blood Gluc  (FREESTYLE GREG 14 DAY READER) PHILLIP 1 each by Does not apply route 4 times daily (before meals and nightly) 1 Device 0    Continuous Blood Gluc Sensor (FREESTYLE GREG 14 DAY SENSOR) MISC 1 each by Does not apply route 4 times daily (before meals and nightly) 6 each 3    insulin glargine (LANTUS) 100 UNIT/ML injection vial INJECT 43 UNITS INTO THE SKIN NIGHTLY 5 vial 2    insulin lispro (HUMALOG) 100 UNIT/ML injection vial Sliding scale 5 vial 2    benazepril (LOTENSIN) 40 MG tablet Take 1 tablet by mouth daily 90 tablet 3    aspirin 81 MG EC tablet Take 81 mg by mouth daily. No current facility-administered medications for this visit. Assessment:  1. Palpitation    2. PAC (premature atrial contraction)    3. PVC (premature ventricular contraction)    4. Essential hypertension    5.  Dyslipidemia        Plan:  -Cont toprol, crestor, HCTZ, benazepril and asa  -Cont lasix and KLC PRN   -Follow up in 6 months

## 2019-09-03 ENCOUNTER — TELEPHONE (OUTPATIENT)
Dept: FAMILY MEDICINE CLINIC | Age: 54
End: 2019-09-03

## 2019-09-03 ENCOUNTER — OFFICE VISIT (OUTPATIENT)
Dept: FAMILY MEDICINE CLINIC | Age: 54
End: 2019-09-03
Payer: OTHER GOVERNMENT

## 2019-09-03 VITALS
OXYGEN SATURATION: 99 % | SYSTOLIC BLOOD PRESSURE: 124 MMHG | BODY MASS INDEX: 39.91 KG/M2 | TEMPERATURE: 98.1 F | DIASTOLIC BLOOD PRESSURE: 74 MMHG | WEIGHT: 211.2 LBS | RESPIRATION RATE: 15 BRPM | HEART RATE: 78 BPM

## 2019-09-03 DIAGNOSIS — E10.9 TYPE 1 DIABETES MELLITUS ON INSULIN THERAPY (HCC): ICD-10-CM

## 2019-09-03 DIAGNOSIS — Z01.818 PRE-OP EXAM: Primary | ICD-10-CM

## 2019-09-03 PROCEDURE — 99214 OFFICE O/P EST MOD 30 MIN: CPT | Performed by: FAMILY MEDICINE

## 2019-09-03 PROCEDURE — 93000 ELECTROCARDIOGRAM COMPLETE: CPT | Performed by: FAMILY MEDICINE

## 2019-09-03 RX ORDER — HYDROCHLOROTHIAZIDE 12.5 MG/1
12.5 CAPSULE, GELATIN COATED ORAL DAILY
Qty: 90 CAPSULE | Refills: 1 | Status: SHIPPED | OUTPATIENT
Start: 2019-09-03 | End: 2020-02-04 | Stop reason: ALTCHOICE

## 2019-09-03 NOTE — PROGRESS NOTES
Coordination and gait normal.   Skin: Skin is warm and dry. No rash noted. No erythema. Psychiatric: She has a normal mood and affect. Her behavior is normal.     EKG Interpretation: stable and same as September 2018 EKG. Assessment:       49 y.o. patient with planned surgery as above. Known risk factors for perioperative complications: Diabetes mellitus  Current medications which may produce withdrawal symptoms if withheld perioperatively: none      Plan:     1. Preoperative workup as follows: none, already completed the preadmisstion bloodwork testing. 2. Change in medication regimen before surgery: follow orthopedic instructions    3. Prophylaxis for cardiac events with perioperative beta-blockers: metoprolol  added to regimen  ACC/AHA indications for pre-operative beta-blocker use:    · Vascular surgery with history of postitive stress test  · Intermediate or high risk surgery with history of CAD   · Intermediate or high risk surgery with multiple clinical predictors of CAD- 2 of the following: history of compensated or prior heart failure, history of cerebrovascular disease, DM, or renal insufficiency    Routine administration of higher-dose, long-acting metoprolol in beta-blocker-naïve patients on the day of surgery, and in the absence of dose titration is associated with an overall increase in mortality. Beta-blockers should be started days to weeks prior to surgery and titrated to pulse < 70.  4. Deep vein thrombosis prophylaxis: regimen to be chosen by surgical team  5. No contraindications to planned surgery. 6. Paperwork to be faxed.

## 2019-09-04 NOTE — TELEPHONE ENCOUNTER
Patient aware of results of  referral just needs to know about stopping Aspirin  When should she stop her aspirin prior to surgery they told her to ask her PCP.  Surgery is 9/12

## 2019-09-11 ENCOUNTER — TELEPHONE (OUTPATIENT)
Dept: FAMILY MEDICINE CLINIC | Age: 54
End: 2019-09-11

## 2019-09-12 ENCOUNTER — TELEPHONE (OUTPATIENT)
Dept: FAMILY MEDICINE CLINIC | Age: 54
End: 2019-09-12

## 2019-10-14 ENCOUNTER — PATIENT MESSAGE (OUTPATIENT)
Dept: FAMILY MEDICINE CLINIC | Age: 54
End: 2019-10-14

## 2019-10-15 RX ORDER — FLUCONAZOLE 150 MG/1
150 TABLET ORAL
Qty: 4 TABLET | Refills: 0 | Status: SHIPPED | OUTPATIENT
Start: 2019-10-15 | End: 2020-09-17

## 2020-01-02 RX ORDER — BENAZEPRIL HYDROCHLORIDE 40 MG/1
40 TABLET, FILM COATED ORAL DAILY
Qty: 90 TABLET | Refills: 3 | Status: SHIPPED | OUTPATIENT
Start: 2020-01-02 | End: 2020-02-04 | Stop reason: ALTCHOICE

## 2020-01-02 RX ORDER — INSULIN GLARGINE 100 [IU]/ML
INJECTION, SOLUTION SUBCUTANEOUS
Qty: 5 VIAL | Refills: 2 | Status: SHIPPED | OUTPATIENT
Start: 2020-01-02 | End: 2020-11-16

## 2020-01-02 NOTE — TELEPHONE ENCOUNTER
Last OV 9/3/19  Last labs 8/5/19  Please advise  Thank you       Ref.  Range 8/5/2019 09:40   Glucose Latest Ref Range: 70 - 99 mg/dL 209 (H)   Hemoglobin A1C Latest Ref Range: See comment % 7.6   eAG (mg/dL) Latest Units: mg/dL 171.4

## 2020-02-04 ENCOUNTER — OFFICE VISIT (OUTPATIENT)
Dept: FAMILY MEDICINE CLINIC | Age: 55
End: 2020-02-04
Payer: OTHER GOVERNMENT

## 2020-02-04 ENCOUNTER — TELEPHONE (OUTPATIENT)
Dept: FAMILY MEDICINE CLINIC | Age: 55
End: 2020-02-04

## 2020-02-04 VITALS
DIASTOLIC BLOOD PRESSURE: 55 MMHG | SYSTOLIC BLOOD PRESSURE: 98 MMHG | HEART RATE: 77 BPM | RESPIRATION RATE: 12 BRPM | WEIGHT: 215 LBS | OXYGEN SATURATION: 97 % | BODY MASS INDEX: 40.62 KG/M2

## 2020-02-04 DIAGNOSIS — E10.9 TYPE 1 DIABETES MELLITUS ON INSULIN THERAPY (HCC): ICD-10-CM

## 2020-02-04 DIAGNOSIS — R53.83 OTHER FATIGUE: ICD-10-CM

## 2020-02-04 DIAGNOSIS — N91.2 AMENORRHEA: ICD-10-CM

## 2020-02-04 DIAGNOSIS — L65.9 HAIR LOSS: ICD-10-CM

## 2020-02-04 LAB
A/G RATIO: 1.8 (ref 1.1–2.2)
ALBUMIN SERPL-MCNC: 4.3 G/DL (ref 3.4–5)
ALP BLD-CCNC: 92 U/L (ref 40–129)
ALT SERPL-CCNC: 16 U/L (ref 10–40)
ANION GAP SERPL CALCULATED.3IONS-SCNC: 13 MMOL/L (ref 3–16)
AST SERPL-CCNC: 16 U/L (ref 15–37)
BILIRUB SERPL-MCNC: 0.3 MG/DL (ref 0–1)
BUN BLDV-MCNC: 15 MG/DL (ref 7–20)
CALCIUM SERPL-MCNC: 9.3 MG/DL (ref 8.3–10.6)
CHLORIDE BLD-SCNC: 100 MMOL/L (ref 99–110)
CO2: 27 MMOL/L (ref 21–32)
CREAT SERPL-MCNC: 0.7 MG/DL (ref 0.6–1.1)
CREATININE URINE: 120.6 MG/DL (ref 28–259)
FOLLICLE STIMULATING HORMONE: 14.9 MIU/ML
GFR AFRICAN AMERICAN: >60
GFR NON-AFRICAN AMERICAN: >60
GLOBULIN: 2.4 G/DL
GLUCOSE BLD-MCNC: 137 MG/DL (ref 70–99)
HCT VFR BLD CALC: 38.3 % (ref 36–48)
HEMOGLOBIN: 12.9 G/DL (ref 12–16)
MCH RBC QN AUTO: 29.9 PG (ref 26–34)
MCHC RBC AUTO-ENTMCNC: 33.8 G/DL (ref 31–36)
MCV RBC AUTO: 88.7 FL (ref 80–100)
MICROALBUMIN UR-MCNC: <1.2 MG/DL
MICROALBUMIN/CREAT UR-RTO: NORMAL MG/G (ref 0–30)
PDW BLD-RTO: 14.4 % (ref 12.4–15.4)
PLATELET # BLD: 157 K/UL (ref 135–450)
PMV BLD AUTO: 10.8 FL (ref 5–10.5)
POTASSIUM SERPL-SCNC: 4.4 MMOL/L (ref 3.5–5.1)
RBC # BLD: 4.32 M/UL (ref 4–5.2)
SODIUM BLD-SCNC: 140 MMOL/L (ref 136–145)
T4 FREE: 1 NG/DL (ref 0.9–1.8)
TOTAL PROTEIN: 6.7 G/DL (ref 6.4–8.2)
TSH SERPL DL<=0.05 MIU/L-ACNC: 1.7 UIU/ML (ref 0.27–4.2)
WBC # BLD: 5.1 K/UL (ref 4–11)

## 2020-02-04 PROCEDURE — 99214 OFFICE O/P EST MOD 30 MIN: CPT | Performed by: FAMILY MEDICINE

## 2020-02-04 RX ORDER — FAMOTIDINE 20 MG/1
20-40 TABLET, FILM COATED ORAL 2 TIMES DAILY
Qty: 60 TABLET | Refills: 5 | Status: SHIPPED | OUTPATIENT
Start: 2020-02-04 | End: 2021-02-28 | Stop reason: SDUPTHER

## 2020-02-04 RX ORDER — BENAZEPRIL HYDROCHLORIDE 20 MG/1
TABLET ORAL
Qty: 90 TABLET | Refills: 3 | Status: SHIPPED | OUTPATIENT
Start: 2020-02-04 | End: 2021-01-12

## 2020-02-04 ASSESSMENT — PATIENT HEALTH QUESTIONNAIRE - PHQ9
1. LITTLE INTEREST OR PLEASURE IN DOING THINGS: 0
2. FEELING DOWN, DEPRESSED OR HOPELESS: 0
SUM OF ALL RESPONSES TO PHQ QUESTIONS 1-9: 0
SUM OF ALL RESPONSES TO PHQ QUESTIONS 1-9: 0
SUM OF ALL RESPONSES TO PHQ9 QUESTIONS 1 & 2: 0

## 2020-02-04 NOTE — PATIENT INSTRUCTIONS
1) Levels of Biotin which could improve hair thickness and hair regeneration are anywhere from 5000 to 28207 mcg of Biotin. 2) Discard HCTZ. 3) While awaiting mail order for the benazepril 20 mg, continue 1/2 tab of 40 mg benazepril. 4) Once you get approval from [facility] for Dr. Yuni Leggett and Dr. Shahab De La Cruz, make appointment for the palpitations.

## 2020-02-04 NOTE — PROGRESS NOTES
as directed by physician Yes Tayla Aguila DO   metoprolol succinate (TOPROL XL) 25 MG extended release tablet Take 1 tablet by mouth daily Yes ADAN Whitlock CNP   rosuvastatin (CRESTOR) 20 MG tablet Take 1 tablet by mouth nightly Yes ADAN Whitlock CNP   Insulin Syringe-Needle U-100 (B-D INS SYRINGE 0.5CC/30GX1/2\") 30G X 1/2\" 0.5 ML MISC USE TO INJECT UNDER THE SKIN 6 TIMES DAILY Yes Leda Ramirez MD   furosemide (LASIX) 20 MG tablet Take 1-2 tablets by mouth daily as needed (leg edema) Yes Tayla Aguila DO   celecoxib (CELEBREX) 200 MG capsule Take 1 capsule daily. On days of severe pain, may take 1 capsule BID. Yes Tayla Aguila DO   Handicap Placard MISC by Does not apply route Dx of bilateral knee osteoarthritis. Effective Feb 25, 2019 until Feb 25, 2025. Yes Tayla Aguila DO   Continuous Blood Gluc  (FREESTYLE GREG 14 DAY READER) PHILLIP 1 each by Does not apply route 4 times daily (before meals and nightly) Yes Tayla Aguila DO   Continuous Blood Gluc Sensor (FREESTYLE GREG 14 DAY SENSOR) MISC 1 each by Does not apply route 4 times daily (before meals and nightly) Yes Tayla Aguila DO   aspirin 81 MG EC tablet Take 81 mg by mouth daily.  Yes Historical Provider, MD   fluconazole (DIFLUCAN) 150 MG tablet Take 1 tablet by mouth every 72 hours as needed (yeast infection)  Tayla Aguila DO          Vitals:    02/04/20 0845   BP: (!) 98/55   Pulse: 77   Resp: 12   SpO2: 97%   Weight: 215 lb (97.5 kg)      Wt Readings from Last 3 Encounters:   02/04/20 215 lb (97.5 kg)   09/03/19 211 lb 3.2 oz (95.8 kg)   08/06/19 209 lb 9.6 oz (95.1 kg)     BP Readings from Last 3 Encounters:   02/04/20 (!) 98/55   09/03/19 124/74   08/06/19 112/66       Patient Active Problem List   Diagnosis    History of carpal tunnel syndrome    HYPERLIPIDEMIA    Type 1 diabetes mellitus ([facility] [address] -- diagnosed as 7year-old    Lumbar disc disease  Palpitations    Arthritis, hip    Shoulder pain, bilateral    Murmur    Edema    Vertigo    BMI 40.0-44.9, adult (HCC)    Trigger middle finger of left hand    Type 1 diabetes mellitus on insulin therapy ([facility] [address]    Dyslipidemia    Diastolic dysfunction without heart failure    Multiple thyroid nodules       Immunization History   Administered Date(s) Administered    Hepatitis A 2013    Influenza 10/31/2011, 2013    Influenza Vaccine, unspecified formulation 10/31/2011, 2013    Influenza Virus Vaccine 2009, 2016    Influenza, Quadv, IM, PF (6 mo and older Fluzone, Flulaval, Fluarix, and 3 yrs and older Afluria) 2016       Past Medical History:   Diagnosis Date    Acute upper respiratory infections of unspecified site 2010    Anxiety state, unspecified 2010    Arthritis     Carpal tunnel syndrome 2010    Ingrowing nail 2010    Multiple thyroid nodules 2019    Other and unspecified hyperlipidemia 2010    Pain in joint, pelvic region and thigh 2010    PONV (postoperative nausea and vomiting)     Screening 2010    Type I (juvenile type) diabetes mellitus without mention of complication, not stated as uncontrolled 2010    Unspecified arthropathy, ankle and foot 2010    Unspecified essential hypertension 2010    Urinary tract infection, site not specified 2010    Wrist fracture     left     Past Surgical History:   Procedure Laterality Date    CARPAL TUNNEL RELEASE Bilateral      SECTION      FINGER TRIGGER RELEASE      3 of them    KNEE SURGERY      SHOULDER ARTHROSCOPY Right 2018    RIGHT SHOULDER ARTHROSCOPY, DEBRIDEMENT, DECOMPRESSION WITH    TUBAL LIGATION      WRIST SURGERY  3/08     wrist fracture     Family History   Problem Relation Age of Onset    Asthma Mother     Other Mother         history of blood clots    Cancer Paternal Grandmother         throat

## 2020-02-05 LAB
ESTIMATED AVERAGE GLUCOSE: 185.8 MG/DL
HBA1C MFR BLD: 8.1 %

## 2020-02-12 LAB — VITAMIN B7: 1438.5 PG/ML (ref 221–3004)

## 2020-02-18 ENCOUNTER — TELEPHONE (OUTPATIENT)
Dept: FAMILY MEDICINE CLINIC | Age: 55
End: 2020-02-18

## 2020-02-18 NOTE — TELEPHONE ENCOUNTER
Can you resend the mammogram order. The old order is for augmented , pt does not have implants. . New order for screening  Only  Fax to  [phone]

## 2020-04-22 ENCOUNTER — PATIENT MESSAGE (OUTPATIENT)
Dept: FAMILY MEDICINE CLINIC | Age: 55
End: 2020-04-22

## 2020-05-14 ENCOUNTER — TELEPHONE (OUTPATIENT)
Dept: FAMILY MEDICINE CLINIC | Age: 55
End: 2020-05-14

## 2020-05-14 ENCOUNTER — VIRTUAL VISIT (OUTPATIENT)
Dept: FAMILY MEDICINE CLINIC | Age: 55
End: 2020-05-14
Payer: OTHER GOVERNMENT

## 2020-05-14 PROCEDURE — 99213 OFFICE O/P EST LOW 20 MIN: CPT | Performed by: FAMILY MEDICINE

## 2020-05-14 RX ORDER — PHENAZOPYRIDINE HYDROCHLORIDE 200 MG/1
200 TABLET, FILM COATED ORAL 3 TIMES DAILY PRN
Qty: 9 TABLET | Refills: 0 | Status: SHIPPED | OUTPATIENT
Start: 2020-05-14 | End: 2020-05-17

## 2020-05-14 RX ORDER — CIPROFLOXACIN 500 MG/1
500 TABLET, FILM COATED ORAL 2 TIMES DAILY
Qty: 10 TABLET | Refills: 0 | Status: SHIPPED | OUTPATIENT
Start: 2020-05-14 | End: 2020-05-15 | Stop reason: SDUPTHER

## 2020-05-14 RX ORDER — FLUCONAZOLE 150 MG/1
150 TABLET ORAL
Qty: 2 TABLET | Refills: 0 | Status: SHIPPED | OUTPATIENT
Start: 2020-05-14 | End: 2020-05-20

## 2020-05-14 NOTE — PROGRESS NOTES
every 72 hours as needed (yeast infection) Yes Jimena Kimball DO   blood glucose test strips (PRECISION XTRA TEST STRIPS) strip TEST BLOOD SUGAR 7-8 TIMES A DAY or as directed by physician Yes Bravo Aguila DO   metoprolol succinate (TOPROL XL) 25 MG extended release tablet Take 1 tablet by mouth daily Yes Derrek Holter, APRN - CNP   rosuvastatin (CRESTOR) 20 MG tablet Take 1 tablet by mouth nightly Yes Derrek Holter, APRN - CNP   Insulin Syringe-Needle U-100 (B-D INS SYRINGE 0.5CC/30GX1/2\") 30G X 1/2\" 0.5 ML MISC USE TO INJECT UNDER THE SKIN 6 TIMES DAILY Yes Viry House MD   furosemide (LASIX) 20 MG tablet Take 1-2 tablets by mouth daily as needed (leg edema) Yes Bravo Aguila DO   celecoxib (CELEBREX) 200 MG capsule Take 1 capsule daily. On days of severe pain, may take 1 capsule BID. Yes Bravo Aguila DO   Handicap Placard MISC by Does not apply route Dx of bilateral knee osteoarthritis. Effective Feb 25, 2019 until Feb 25, 2025. Yes Bravo Aguila DO   Continuous Blood Gluc  (FREESTYLE GREG 14 DAY READER) PHILLIP 1 each by Does not apply route 4 times daily (before meals and nightly) Yes Bravo Aguila DO   Continuous Blood Gluc Sensor (FREESTYLE GREG 14 DAY SENSOR) MISC 1 each by Does not apply route 4 times daily (before meals and nightly) Yes Bravo Aguila DO   aspirin 81 MG EC tablet Take 81 mg by mouth daily. Yes Historical Provider, MD   famotidine (PEPCID) 20 MG tablet Take 1-2 tablets by mouth 2 times daily  Jimena Kimball DO          There were no vitals filed for this visit.    Wt Readings from Last 3 Encounters:   02/04/20 215 lb (97.5 kg)   09/03/19 211 lb 3.2 oz (95.8 kg)   08/06/19 209 lb 9.6 oz (95.1 kg)     BP Readings from Last 3 Encounters:   02/04/20 (!) 98/55   09/03/19 124/74   08/06/19 112/66       Patient Active Problem List   Diagnosis    History of carpal tunnel syndrome    HYPERLIPIDEMIA    Alert and awake  [x] Oriented to person/place/time [x]Able to follow commands      Eyes:  EOM    [x]  Normal  [] Abnormal-  Sclera  [x]  Normal  [] Abnormal -         Discharge [x]  None visible  [] Abnormal -    HENT:   [x] Normocephalic, atraumatic. [] Abnormal   [] Mouth/Throat: Mucous membranes are moist.     External Ears [x] Normal  [] Abnormal-     Neck: [x] No visualized mass     Pulmonary/Chest: [x] Respiratory effort normal.  [x] No visualized signs of difficulty breathing or respiratory distress        [] Abnormal-      Musculoskeletal:   [] Normal gait with no signs of ataxia         [x] Normal range of motion of neck        [] Abnormal-       Neurological:        [x] No Facial Asymmetry (Cranial nerve 7 motor function) (limited exam to video visit)          [x] No gaze palsy        [] Abnormal-         Skin:        [x] No significant exanthematous lesions or discoloration noted on facial skin         [] Abnormal-            Psychiatric:       [x] Normal Affect [] No Hallucinations        [] Abnormal-     Other pertinent observable physical exam findings-     Due to this being a TeleHealth encounter, evaluation of the following organ systems is limited: Vitals/Constitutional/EENT/Resp/CV/GI//MS/Neuro/Skin/Heme-Lymph-Imm. ASSESSMENT   Diagnosis Orders   1. Urinary tract infection without hematuria, site unspecified  Culture, Urine    URINALYSIS    ciprofloxacin (CIPRO) 500 MG tablet    fluconazole (DIFLUCAN) 150 MG tablet    phenazopyridine (PYRIDIUM) 200 MG tablet   The risks, benefits, potential side effects and barriers to medication use were addressed today. Understanding was acknowledged. Patient asked to follow-up if condition(s) do not improve as anticipated. No improvement by 48h, give UC/UCx at [address] lab      PLAN          If applicable, see additional patient information and instructions under \"Patient Instructions. \"    No follow-ups on file.   There are no Patient Instructions on file for this visit. TOTAL TIME (in minutes) SPENT ON CONFERENCING:  15    Please note a portion of this chart was generated using dragon dictation software. Although every effort was made to ensure the accuracy of this automated transcription, some errors in transcription may have occurred. Pursuant to the emergency declaration under the [address] [place] [address], [facility] waiver authority and the [employer] Act, this Virtual  Visit was conducted, with patient's consent, to reduce the patient's risk of exposure to COVID-19 and provide continuity of care for an established patient. Services were provided through a video synchronous discussion virtually to substitute for in-person clinic visit. Patient was instructed that the AVS is available on My Chart or was emailed to the patient if not on My Chart. Lab orders were emailed to patient if they do not use a [facility] lab. Any work notes were sent to patient through My Chart or email.

## 2020-05-15 ENCOUNTER — TELEPHONE (OUTPATIENT)
Dept: FAMILY MEDICINE CLINIC | Age: 55
End: 2020-05-15

## 2020-05-15 RX ORDER — CIPROFLOXACIN 500 MG/1
500 TABLET, FILM COATED ORAL 2 TIMES DAILY
Qty: 10 TABLET | Refills: 0 | Status: SHIPPED | OUTPATIENT
Start: 2020-05-15 | End: 2020-05-20

## 2020-05-15 NOTE — TELEPHONE ENCOUNTER
Jasmin Mcmahon called requesting direction clarification. Please advise.  Thanks      ciprofloxacin (CIPRO) 500 MG tablet [[id number]]     Order Details   Dose: 500 mg Route: Oral Frequency: 2 TIMES DAILY  Dispense Quantity: 10 tablet Refills: 0 Fills remaining: --         Sig: Take 1 tablet by mouth 2 times daily for 10 days        Written Date: 05/14/20 Expiration Date: 05/14/21    Start Date: 05/14/20 End Date: 05/24/20 after 20 doses    Ordering Provider:  -- Authorizing Provider: Virgilio Owens DO Ordering User:  Virgilio Owens DO        Diagnosis Association: Urinary tract infection without hematuria, site unspecified (N39.0)    Pharmacy:  [facility] [address], [address] [facility]     Pharmacy Comments: --        Fill quantity remaining: -- Fill quantity

## 2020-07-21 NOTE — TELEPHONE ENCOUNTER
Last VV 5/14/20  Please advise  Thank you   Ref.  Range 2/4/2020 10:02   Glucose Latest Ref Range: 70 - 99 mg/dL 137 (H)   Hemoglobin A1C Latest Ref Range: See comment % 8.1   eAG (mg/dL) Latest Units: mg/dL 185.8

## 2020-07-22 RX ORDER — BLOOD SUGAR DIAGNOSTIC
STRIP MISCELLANEOUS
Qty: 800 STRIP | Refills: 3 | Status: SHIPPED | OUTPATIENT
Start: 2020-07-22 | End: 2020-09-17 | Stop reason: ALTCHOICE

## 2020-07-28 ENCOUNTER — TELEPHONE (OUTPATIENT)
Dept: FAMILY MEDICINE CLINIC | Age: 55
End: 2020-07-28

## 2020-07-28 RX ORDER — LANCETS 28 GAUGE
1 EACH MISCELLANEOUS
Qty: 300 EACH | Refills: 5 | Status: SHIPPED | OUTPATIENT
Start: 2020-07-28

## 2020-07-28 NOTE — TELEPHONE ENCOUNTER
E-scribed. Diagnosis Orders   1. Type 1 diabetes mellitus on insulin therapy ([facility] [address]  FreeStyle Lancets MISC     Let her know by ph or mychart.

## 2020-07-28 NOTE — TELEPHONE ENCOUNTER
[facility] called, stating that pt has received a new glucometer, the Freestyle Lite, and has received testing strips, but has no script for lancets. They are asking to have e-script for the lancets sent to [employer].     Pharmacy: [facility] [place] , [address] [facility] [address] [facility] [phone]

## 2020-08-04 ENCOUNTER — OFFICE VISIT (OUTPATIENT)
Dept: PRIMARY CARE CLINIC | Age: 55
End: 2020-08-04
Payer: OTHER GOVERNMENT

## 2020-08-04 PROCEDURE — 99211 OFF/OP EST MAY X REQ PHY/QHP: CPT | Performed by: NURSE PRACTITIONER

## 2020-08-04 NOTE — PROGRESS NOTES
[place] received a viral test for COVID-19. They were educated on isolation and quarantine as appropriate. For any symptoms, they were directed to seek care from their PCP, given contact information to establish with a doctor, directed to an urgent care or the emergency room.

## 2020-08-05 LAB — SARS-COV-2, NAA: NOT DETECTED

## 2020-08-07 ENCOUNTER — PATIENT MESSAGE (OUTPATIENT)
Dept: FAMILY MEDICINE CLINIC | Age: 55
End: 2020-08-07

## 2020-08-07 NOTE — TELEPHONE ENCOUNTER
From: Ricki Zaragoza  To: Thiago DO Johan  Sent: 8/7/2020  8:56 AM EDT  Subject: Non-Urgent Medical Question    Good Morning  I was wondering if I could please get a referral to see dermatology? I have 2 moles I have become concerned with. I would like to see Ann Marie Austin if possible, her info is  Ann Marie Austin  [facility] [address]   [address] [phone]    Please let me know if you can summit this referral.  [facility] your doing well. Thank you for your time. If you need to contact me I can be reached at [phone].     Usman Tolbert

## 2020-08-13 NOTE — TELEPHONE ENCOUNTER
Please fax referral to the derm clinic. Also fax referral along with the [facility] form (in [address]) to [facility]. Let Wil Alvarado know once done. She should then wait for approval from [employer] before seeing the dermatology clinic.

## 2020-08-18 ENCOUNTER — PATIENT MESSAGE (OUTPATIENT)
Dept: FAMILY MEDICINE CLINIC | Age: 55
End: 2020-08-18

## 2020-08-19 NOTE — TELEPHONE ENCOUNTER
Requested Prescriptions     Pending Prescriptions Disp Refills    celecoxib (CELEBREX) 200 MG capsule [Pharmacy Med Name: CELECOXIB CAPS 200MG] 180 capsule 3     Sig: TAKE 1 CAPSULE DAILY,   ON DAYS OF SEVERE PAIN, MAY TAKE 1 CAPSULE TWICE A DAY     Ryley Eden

## 2020-08-19 NOTE — TELEPHONE ENCOUNTER
From: Keturah Toribio  To: Nina Kemp DO  Sent: 8/18/2020 12:24 PM EDT  Subject: Prescription Question    Dr. Alexx Ambrosio wanted me to change blood testing machine to a FreeStyle Lite or FreeStyle Freedom Lite. I received a coupon from [place] for a free machine but in [place] the pharmacy needs a prescription from the Doctor. Can you please send a prescription to my pharmacy on record as soon as possible. [employer] has already send me new testing strips. Thank you for your time please contact me at [phone] if you have any questions.     Thanks    Aston Phna

## 2020-08-20 RX ORDER — CELECOXIB 200 MG/1
CAPSULE ORAL
Qty: 180 CAPSULE | Refills: 3 | Status: SHIPPED | OUTPATIENT
Start: 2020-08-20 | End: 2021-09-07 | Stop reason: SDUPTHER

## 2020-08-22 RX ORDER — BLOOD-GLUCOSE METER
1 KIT MISCELLANEOUS
Qty: 1 KIT | Refills: 0 | Status: SHIPPED | OUTPATIENT
Start: 2020-08-22 | End: 2020-08-22 | Stop reason: SDUPTHER

## 2020-08-22 RX ORDER — BLOOD-GLUCOSE METER
1 KIT MISCELLANEOUS
Qty: 1 KIT | Refills: 0 | Status: SHIPPED | OUTPATIENT
Start: 2020-08-22

## 2020-08-25 LAB — DIABETIC RETINOPATHY: NEGATIVE

## 2020-08-26 RX ORDER — LANCETS 30 GAUGE
EACH MISCELLANEOUS
Qty: 800 EACH | Refills: 3 | Status: SHIPPED | OUTPATIENT
Start: 2020-08-26 | End: 2020-09-17

## 2020-08-26 RX ORDER — GLUCOSAMINE HCL/CHONDROITIN SU 500-400 MG
CAPSULE ORAL
Qty: 800 STRIP | Refills: 3 | Status: SHIPPED | OUTPATIENT
Start: 2020-08-26 | End: 2021-05-12 | Stop reason: SDUPTHER

## 2020-08-28 ENCOUNTER — TELEPHONE (OUTPATIENT)
Dept: FAMILY MEDICINE CLINIC | Age: 55
End: 2020-08-28

## 2020-09-17 ENCOUNTER — OFFICE VISIT (OUTPATIENT)
Dept: FAMILY MEDICINE CLINIC | Age: 55
End: 2020-09-17
Payer: OTHER GOVERNMENT

## 2020-09-17 ENCOUNTER — TELEPHONE (OUTPATIENT)
Dept: FAMILY MEDICINE CLINIC | Age: 55
End: 2020-09-17

## 2020-09-17 VITALS
RESPIRATION RATE: 16 BRPM | BODY MASS INDEX: 42.02 KG/M2 | TEMPERATURE: 97.7 F | WEIGHT: 222.4 LBS | DIASTOLIC BLOOD PRESSURE: 84 MMHG | HEART RATE: 86 BPM | SYSTOLIC BLOOD PRESSURE: 142 MMHG | OXYGEN SATURATION: 97 %

## 2020-09-17 PROCEDURE — 99214 OFFICE O/P EST MOD 30 MIN: CPT | Performed by: FAMILY MEDICINE

## 2020-09-17 PROCEDURE — 3051F HG A1C>EQUAL 7.0%<8.0%: CPT | Performed by: FAMILY MEDICINE

## 2020-09-17 RX ORDER — FLASH GLUCOSE SENSOR
1 KIT MISCELLANEOUS
Qty: 6 EACH | Refills: 3 | Status: SHIPPED | OUTPATIENT
Start: 2020-09-17 | End: 2021-04-28 | Stop reason: ALTCHOICE

## 2020-09-17 RX ORDER — DIAZEPAM 5 MG/1
TABLET ORAL
Qty: 30 TABLET | Refills: 0 | Status: SHIPPED | OUTPATIENT
Start: 2020-09-17 | End: 2020-10-17

## 2020-09-17 NOTE — PROGRESS NOTES
[facility]  Progress Note  Magy Elizabeth DO          Agustin Lorenzo  1965 09/27/20    Chief Complaint:   Agustin Lorenzo is a 48 y.o. female who is here for For diabetes follow-up        HPI:   Otherwise doing well. Continues to experience bilateral knee pain. Sees the orthopedic doctor for this. Denies any hypoglycemic moments. And experiencing ongoing GERD. Experiencing uncontrolled heartburn. Patient as if she could have hiatal hernia contributing. She also requests refill of diazepam which he takes as needed for anxiety    ROS negative for headache, visionchanges, chest pain, shortness of breath, abdominal pain, urinary sx, bowel changes. Past medical, surgical, and social history reviewed. and allergies reviewed. Allergies   Allergen Reactions    Lipitor [Atorvastatin] Other (See Comments)     Joint pain     Prior to Visit Medications    Medication Sig Taking? Authorizing Provider   diazePAM (VALIUM) 5 MG tablet Take 1/2 to 1 tab po BID prn anxious thoughts. Yes Debi Aguila, DO   Continuous Blood Gluc Sensor (FREESTYLE GREG 14 DAY SENSOR) MISC 1 each by Does not apply route 4 times daily (before meals and nightly) Yes Yanique Rivero, DO   blood glucose monitor strips Test 7-8 times a day & as needed for symptoms of irregular blood glucose. Dispense sufficient amount for indicated testing frequency plus additional to accommodate PRN testing needs.  Yes Debi Aguila DO   Blood Glucose Monitoring Suppl (FREESTYLE FREEDOM LITE) w/Device KIT 1 kit by Does not apply route 4 times daily (before meals and nightly) Yes Debi Aguila DO   celecoxib (CELEBREX) 200 MG capsule TAKE 1 CAPSULE DAILY,   ON DAYS OF SEVERE PAIN, MAY TAKE 1 CAPSULE TWICE A DAY Yes Debi Aguila DO   FreeStyle Lancets MISC 1 each by Does not apply route 8 times daily Yes Debi Aguila DO   insulin lispro (HUMALOG) 100 UNIT/ML injection vial Sliding scale Yes Jimenez Aguila DO   benazepril (LOTENSIN) 20 MG tablet Take 1 tab daily. If SBP is below 110, then take 1/2 tab daily. If DBP is below 60, then take 1/2 tab daily. Yes Jimenez Aguila DO   insulin glargine (LANTUS) 100 UNIT/ML injection vial INJECT 43 UNITS INTO THE SKIN NIGHTLY  Patient taking differently: INJECT 35 UNITS INTO THE SKIN NIGHTLY Yes Jimenez Aguila DO   metoprolol succinate (TOPROL XL) 25 MG extended release tablet Take 1 tablet by mouth daily Yes ADAN Fox CNP   rosuvastatin (CRESTOR) 20 MG tablet Take 1 tablet by mouth nightly Yes ADAN Fox CNP   Insulin Syringe-Needle U-100 (B-D INS SYRINGE 0.5CC/30GX1/2\") 30G X 1/2\" 0.5 ML MISC USE TO INJECT UNDER THE SKIN 6 TIMES DAILY Yes Randall Ceja MD   Handicap Placard MISC by Does not apply route Dx of bilateral knee osteoarthritis. Effective Feb 25, 2019 until Feb 25, 2025. Yes Jimenez Aguila DO   aspirin 81 MG EC tablet Take 81 mg by mouth daily.  Yes Historical Provider, MD   famotidine (PEPCID) 20 MG tablet Take 1-2 tablets by mouth 2 times daily  Bhakti Walker DO          Vitals:    09/17/20 1351 09/17/20 1427   BP: (!) 164/68 (!) 142/84   Pulse: 86    Resp: 16    Temp: 97.7 °F (36.5 °C)    TempSrc: Oral    SpO2: 97%    Weight: 222 lb 6.4 oz (100.9 kg)       Wt Readings from Last 3 Encounters:   09/17/20 222 lb 6.4 oz (100.9 kg)   02/04/20 215 lb (97.5 kg)   09/03/19 211 lb 3.2 oz (95.8 kg)     BP Readings from Last 3 Encounters:   09/17/20 (!) 142/84   02/04/20 (!) 98/55   09/03/19 124/74       Patient Active Problem List   Diagnosis    History of carpal tunnel syndrome    HYPERLIPIDEMIA    [facility] -- diagnosed as 7year-old    Lumbar disc disease    Palpitations    Arthritis, hip    Shoulder pain, bilateral    Murmur    Edema    Vertigo    BMI 40.0-44.9, adult (HCC)    Trigger middle finger of left hand    Type 1 diabetes mellitus on insulin therapy ([facility]    Dyslipidemia    Diastolic dysfunction without heart failure    Multiple thyroid nodules       Immunization History   Administered Date(s) Administered    Hepatitis A 2013    Influenza 10/31/2011, 2013    Influenza Vaccine, unspecified formulation 10/31/2011, 2013    Influenza Virus Vaccine 2009, 2016    Influenza, Quadv, IM, PF (6 mo and older Fluzone, Flulaval, Fluarix, and 3 yrs and older Afluria) 2016       Past Medical History:   Diagnosis Date    Acute upper respiratory infections of unspecified site 2010    Anxiety state, unspecified 2010    Arthritis     Carpal tunnel syndrome 2010    Ingrowing nail 2010    Multiple thyroid nodules 2019    Other and unspecified hyperlipidemia 2010    Pain in joint, pelvic region and thigh 2010    PONV (postoperative nausea and vomiting)     Screening 2010    Type I (juvenile type) diabetes mellitus without mention of complication, not stated as uncontrolled 2010    Unspecified arthropathy, ankle and foot 2010    Unspecified essential hypertension 2010    Urinary tract infection, site not specified 2010    Wrist fracture     left     Past Surgical History:   Procedure Laterality Date    CARPAL TUNNEL RELEASE Bilateral      SECTION      FINGER TRIGGER RELEASE      3 of them    KNEE SURGERY      SHOULDER ARTHROSCOPY Right 2018    RIGHT SHOULDER ARTHROSCOPY, DEBRIDEMENT, DECOMPRESSION WITH    TUBAL LIGATION      WRIST SURGERY  3/08     wrist fracture     Family History   Problem Relation Age of Onset    Asthma Mother     Other Mother         history of blood clots    Cancer Paternal Grandmother         throat    Birth Defects Neg Hx     Depression Neg Hx     Early Death Neg Hx     High Blood Pressure Neg Hx      Social History     Socioeconomic History    Marital status:      Spouse name: Not on file    Number of children: 3    Years of education: Not on file    Highest education level: Not on file   Occupational History    Occupation: stay at home Mom   Social Needs    Financial resource strain: Not on file    Food insecurity     Worry: Not on file     Inability: Not on file    Transportation needs     Medical: Not on file     Non-medical: Not on file   Tobacco Use    Smoking status: Never Smoker    Smokeless tobacco: Never Used   Substance and Sexual Activity    Alcohol use: No    Drug use: No    Sexual activity: Not on file   Lifestyle    Physical activity     Days per week: Not on file     Minutes per session: Not on file    Stress: Not on file   Relationships    Social connections     Talks on phone: Not on file     Gets together: Not on file     Attends Orthodox service: Not on file     Active member of club or organization: Not on file     Attends meetings of clubs or organizations: Not on file     Relationship status: Not on file    Intimate partner violence     Fear of current or ex partner: Not on file     Emotionally abused: Not on file     Physically abused: Not on file     Forced sexual activity: Not on file   Other Topics Concern    Not on file   Social History Narrative    Not on file       O: BP (!) 142/84   Pulse 86   Temp 97.7 °F (36.5 °C) (Oral)   Resp 16   Wt 222 lb 6.4 oz (100.9 kg)   SpO2 97%   Breastfeeding No   BMI 42.02 kg/m²   Physical Exam  GEN: No acute distress,cooperative, well nourished, alert. HEENT: PEERLA, EOMI , normocephalic/atraumatic, nares and oropharynx clear. Mucus membranes normal, Tympanic membranes clear bilaterally. Neck: soft, supple, no thyromegaly,mass, no Lymphadenopathy  CV: Regular rate and rhythm, no murmur, rubs, gallops. No edema. Resp: Clear to auscultation bilaterally good air entry bilaterally  No crackles, wheeze. Breathing comfortably. Psych:normal affect.   Neuro: AOx3  See plan below.        ASSESSMENT   Diagnosis Orders   1. Epigastric pain  FL ESOPHAGRAM   2. Vertigo  diazePAM (VALIUM) 5 MG tablet   3. Type 1 diabetes mellitus without complication (HCC)  Continuous Blood Gluc Sensor (FREESTYLE GREG 14 DAY SENSOR) MISC    Hemoglobin A1C    COMPREHENSIVE METABOLIC PANEL    LIPID PANEL   4. Insulin dependent diabetes mellitus (HCC)  Continuous Blood Gluc Sensor (FREESTYLE GREG 14 DAY SENSOR) MISC   5. Other fatigue  T4, FREE   6. No periods  FOLLICLE STIMULATING HORMONE     #1:  Investigate into the epigastric discomfort. Question about hiatal hernia. Will investigate with Esophagram.  #2: Takes as needed for #2. Otherwise typically controlled. #4:   Lab Results   Component Value Date    LABA1C 7.8 09/24/2020     Lab Results   Component Value Date    .2 09/24/2020     The current medical regimen is effective;  continue present plan and medications. #5 and #6: review. PLAN          If applicable, see additional patient information and instructions under \"Patient Instructions. \"    No follow-ups on file. Patient Instructions   --Diazepam is renewed. --Call [phone] to schedule the barium swallow known as [facility]). --This clinic is confirming that the [facility] clinic can see you with your  insurance. --Call [employer] Once you confirm it is free or inexpensive, contact this clinic and they will order the test .  If you have any questions, Customer Care is available 24/7 at [phone]          Please note a portion of this chart was generated using dragon dictation software. Although every effort was made to ensure the accuracy of this automated transcription,some errors in transcription may have occurred.

## 2020-09-17 NOTE — TELEPHONE ENCOUNTER
1)   Confirm with Woodrow Min office that they take . If they do, how can their office coordinate with  as patient was given dermatologist in [place]. Please update [facility] and me what else needs done. for evaluation of suspicious moles (approx 2). [facility] [address]   [address] [phone]       2) Confirm with  that Esophgram is covered; do they need the add'l /[employer] form.

## 2020-09-24 ENCOUNTER — PATIENT MESSAGE (OUTPATIENT)
Dept: FAMILY MEDICINE CLINIC | Age: 55
End: 2020-09-24

## 2020-09-24 DIAGNOSIS — E10.9 TYPE 1 DIABETES MELLITUS WITHOUT COMPLICATION (HCC): ICD-10-CM

## 2020-09-24 DIAGNOSIS — N91.2 NO PERIODS: ICD-10-CM

## 2020-09-24 DIAGNOSIS — R53.83 OTHER FATIGUE: ICD-10-CM

## 2020-09-24 LAB
A/G RATIO: 2 (ref 1.1–2.2)
ALBUMIN SERPL-MCNC: 4.3 G/DL (ref 3.4–5)
ALP BLD-CCNC: 103 U/L (ref 40–129)
ALT SERPL-CCNC: 11 U/L (ref 10–40)
ANION GAP SERPL CALCULATED.3IONS-SCNC: 12 MMOL/L (ref 3–16)
AST SERPL-CCNC: 15 U/L (ref 15–37)
BILIRUB SERPL-MCNC: 0.3 MG/DL (ref 0–1)
BUN BLDV-MCNC: 11 MG/DL (ref 7–20)
CALCIUM SERPL-MCNC: 9.7 MG/DL (ref 8.3–10.6)
CHLORIDE BLD-SCNC: 105 MMOL/L (ref 99–110)
CHOLESTEROL, TOTAL: 164 MG/DL (ref 0–199)
CO2: 24 MMOL/L (ref 21–32)
CREAT SERPL-MCNC: 0.7 MG/DL (ref 0.6–1.1)
FOLLICLE STIMULATING HORMONE: 49.6 MIU/ML
GFR AFRICAN AMERICAN: >60
GFR NON-AFRICAN AMERICAN: >60
GLOBULIN: 2.1 G/DL
GLUCOSE BLD-MCNC: 83 MG/DL (ref 70–99)
HDLC SERPL-MCNC: 53 MG/DL (ref 40–60)
LDL CHOLESTEROL CALCULATED: 86 MG/DL
POTASSIUM SERPL-SCNC: 4.3 MMOL/L (ref 3.5–5.1)
SODIUM BLD-SCNC: 141 MMOL/L (ref 136–145)
T4 FREE: 1.1 NG/DL (ref 0.9–1.8)
TOTAL PROTEIN: 6.4 G/DL (ref 6.4–8.2)
TRIGL SERPL-MCNC: 124 MG/DL (ref 0–150)
VLDLC SERPL CALC-MCNC: 25 MG/DL

## 2020-09-25 LAB
ESTIMATED AVERAGE GLUCOSE: 177.2 MG/DL
HBA1C MFR BLD: 7.8 %

## 2020-09-27 RX ORDER — ROSUVASTATIN CALCIUM 20 MG/1
20 TABLET, COATED ORAL NIGHTLY
Qty: 90 TABLET | Refills: 3 | Status: SHIPPED | OUTPATIENT
Start: 2020-09-27 | End: 2022-02-17 | Stop reason: SDUPTHER

## 2020-11-10 ENCOUNTER — TELEPHONE (OUTPATIENT)
Dept: FAMILY MEDICINE CLINIC | Age: 55
End: 2020-11-10

## 2020-11-13 ENCOUNTER — OFFICE VISIT (OUTPATIENT)
Dept: PRIMARY CARE CLINIC | Age: 55
End: 2020-11-13
Payer: OTHER GOVERNMENT

## 2020-11-13 PROCEDURE — 99211 OFF/OP EST MAY X REQ PHY/QHP: CPT | Performed by: NURSE PRACTITIONER

## 2020-11-13 NOTE — PROGRESS NOTES
E[place] Bleser received a viral test for COVID-19. They were educated on isolation and quarantine as appropriate. For any symptoms, they were directed to seek care from their PCP, given contact information to establish with a doctor, directed to an urgent care or the emergency room.

## 2020-11-17 LAB — SARS-COV-2, NAA: NOT DETECTED

## 2020-11-18 RX ORDER — INSULIN GLARGINE 100 [IU]/ML
INJECTION, SOLUTION SUBCUTANEOUS
Qty: 50 ML | Refills: 3 | Status: SHIPPED | OUTPATIENT
Start: 2020-11-18 | End: 2022-01-18 | Stop reason: SDUPTHER

## 2020-12-14 ENCOUNTER — PATIENT MESSAGE (OUTPATIENT)
Dept: FAMILY MEDICINE CLINIC | Age: 55
End: 2020-12-14

## 2020-12-14 NOTE — TELEPHONE ENCOUNTER
From: Cici Crecassie  To: Sorayalaana maria Marte DO  Sent: 12/14/2020 12:25 PM EST  Subject: Non-Urgent Medical Question    Good Afternoon     I am having some pain in my wrist and elbow which I believe is coming from an old shoulder issue I have had. I would like to go see the Doctor who did my shoulder surgery but I need a referral for this because I have  for [employer]. I would like to see Dr. Irasema Grey. Could you please submit a request for a referral.  I have provided the following information for Dr. Debo Kelley. Dr. Irasema Grey  [address] [facility]  [phone]    If you have any questions you can reach me at [phone]. Thank you for your time and have a good day.     Thanks    Samm

## 2020-12-15 ENCOUNTER — PATIENT MESSAGE (OUTPATIENT)
Dept: FAMILY MEDICINE CLINIC | Age: 55
End: 2020-12-15

## 2020-12-16 NOTE — TELEPHONE ENCOUNTER
From: Ruthie Moreno  To: Zeenat Maciel DO  Sent: 12/15/2020  9:47 PM EST  Subject: Non-Urgent Medical Question    I prefer to see Dr. Leander Hunter. I spoke to his office and they confirmed they can see me for this issue. So if its okay with you I would prefer to see him. Thank you for your time. Please let me know if this will be an issue or if your okay to put in the referral.     Thank you    Shreyas Kelley      ----- Message -----       From:Yohannes Daly DO       Sent:12/14/2020  7:39 PM EST         To:Valerie L Bleser    Subject:RE: Non-Urgent Medical Question    Hi Mrs. Bleser. If it is for a wrist and/or elbow concern it is typically a different orthopedic doctor. Dr. Leander Hunter is an excellent shoulder orthopedic doctor but he focuses only on shoulders. Can we refer you to a different orthopedic doctor who specializes in wrist and elbow? Do you prefer to see someone in [facility], [address] instead of [facility]? ALB      ----- Message -----       From:Valerie L Bleser       Sent:12/14/2020 12:25 PM EST         To:Yohannes Daly DO    Subject:Non-Urgent Medical Question    Good Afternoon     I am having some pain in my wrist and elbow which I believe is coming from an old shoulder issue I have had. I would like to go see the Doctor who did my shoulder surgery but I need a referral for this because I have  for [employer]. I would like to see Dr. Marcia Aviles. Could you please submit a request for a referral.  I have provided the following information for Dr. Leander Hunter. Dr. Marcia Aviles  [address] [facility]  [phone]    If you have any questions you can reach me at [phone]. Thank you for your time and have a good day.     Thanks    Shreyas Kelley

## 2020-12-17 NOTE — TELEPHONE ENCOUNTER
Can office staff fill out [facility] form for the referral and fax it? It would not be urgent (greater than 72 hours is fine).

## 2021-01-12 RX ORDER — BENAZEPRIL HYDROCHLORIDE 20 MG/1
TABLET ORAL
Qty: 90 TABLET | Refills: 3 | Status: SHIPPED | OUTPATIENT
Start: 2021-01-12 | End: 2022-01-07

## 2021-02-04 ENCOUNTER — PATIENT MESSAGE (OUTPATIENT)
Dept: FAMILY MEDICINE CLINIC | Age: 56
End: 2021-02-04

## 2021-02-04 DIAGNOSIS — D22.9 ATYPICAL MOLE: Primary | ICD-10-CM

## 2021-02-04 NOTE — TELEPHONE ENCOUNTER
From: Zay Fontana  To: Woodrow Sosa DO  Sent: 2/4/2021  8:34 AM EST  Subject: Non-Urgent Medical Question    Dr. Arlene Harvey     I was wondering if your office submitted a referral for dermatology? I havent heard anything back.  The dermatologist I asked for a referral on was  [facility] [address]   [address] [place] [address]  [phone]    Could this be confirmed   If you have any questions you can reach me at [phone]     Thank you    Andrew Calabrese

## 2021-02-05 NOTE — TELEPHONE ENCOUNTER
Record review shows that our office successfully referred Jensen Pollock summer of 2020. We were faxed carbon copy of [employer]  confirmation for Jensen Pollock to see Dr. Queen Montalvo (see the scan from Aug 28, 2020). Did Jensen Pollock not receive this info? If we need to refresh referral, I just placed another referral in [facility]. Staff will need to also fax with the [facility] form tottravis as well. I believe Jensen Pollock originally wanted to see [facility].

## 2021-02-05 NOTE — TELEPHONE ENCOUNTER
[facility] [place] [facility] form filled out and faxed with referral to pt's requested dermatology  Fx [phone]  Spoke with pt. Needed a new referral faxed due to last one had the wrong place listed.

## 2021-02-08 ENCOUNTER — TELEPHONE (OUTPATIENT)
Dept: FAMILY MEDICINE CLINIC | Age: 56
End: 2021-02-08

## 2021-02-08 NOTE — TELEPHONE ENCOUNTER
Please inform pt the referral for [facility] was APPROVED last August and is good until 8/21/21. Document attached.

## 2021-02-22 ENCOUNTER — PATIENT MESSAGE (OUTPATIENT)
Dept: FAMILY MEDICINE CLINIC | Age: 56
End: 2021-02-22

## 2021-02-23 ENCOUNTER — TELEPHONE (OUTPATIENT)
Dept: FAMILY MEDICINE CLINIC | Age: 56
End: 2021-02-23

## 2021-02-23 NOTE — TELEPHONE ENCOUNTER
From: Orvil Stain  To: Erin Molina DO  Sent: 2/22/2021 1:30 PM EST  Subject: Test Results Question    Dr. John So  I have recently went to a dermatologist Marcus Rodríguze who will be forwarding you my results of some mole biospy's today. One of these has come back serious. I have a 2.7mm Melanoma on my back. She wants me to see Dr. Ferny Rocha immediately. Can you please send in a 24 hr turn around referral to [facility] so I can schedule this immediately. Please let me know this has been done so I can move forward with this. If you have any other suggestions or would like to see me please contact me at [phone].  Thank you for your time    Dr. Ferny Rocha  [facility] [address]    Thank you    Keke Robertson

## 2021-02-23 NOTE — TELEPHONE ENCOUNTER
Please see attached [facility] referral approval for Dr. Candelaria Balderas at the [facility] 2/17/2021-8/16/2021.

## 2021-02-28 DIAGNOSIS — K21.9 CHRONIC GASTROESOPHAGEAL REFLUX DISEASE: ICD-10-CM

## 2021-03-01 RX ORDER — FAMOTIDINE 20 MG/1
20-40 TABLET, FILM COATED ORAL 2 TIMES DAILY
Qty: 60 TABLET | Refills: 5 | Status: SHIPPED | OUTPATIENT
Start: 2021-03-01 | End: 2022-09-07

## 2021-03-12 ENCOUNTER — TELEPHONE (OUTPATIENT)
Dept: FAMILY MEDICINE CLINIC | Age: 56
End: 2021-03-12

## 2021-03-12 NOTE — TELEPHONE ENCOUNTER
[facility] called to check the previous message. No referral is needed, the insurance is actually needed called and diagnosis code updated with them. Any questions please call [facility]. [phone]. Please advise, thanks.

## 2021-03-12 NOTE — TELEPHONE ENCOUNTER
[facility] order to inform us that if the pt is wanting the surgery covered by her insurance () they need called.  needs called with the diagnosis codes in order for surgery to be covered. Please call  [facility] back once completed. Codes:    C43.59  C44.519    Please advise, thanks.

## 2021-03-12 NOTE — TELEPHONE ENCOUNTER
Approved Per Mariela @ [facility]  Approval # [id number]  3/9/21-9/4/21  Spoke with [facility]   Surgeons office needs to call [facility]  Referral listed for general surgery not onc/surgery  [facility] [phone]    Office of Dr Nolberto Doss needs to call [facility] and change type of service before procedure can be approved for Monday. Spoke with Judith @ the office of Dr. Nolberto Doss, relayed message as above.

## 2021-03-25 ENCOUNTER — TELEPHONE (OUTPATIENT)
Dept: FAMILY MEDICINE CLINIC | Age: 56
End: 2021-03-25

## 2021-03-25 DIAGNOSIS — C43.9 MALIGNANT MELANOMA, UNSPECIFIED SITE (HCC): Primary | ICD-10-CM

## 2021-03-25 NOTE — TELEPHONE ENCOUNTER
PT called in to state she needs a 24hr  referral for Oncology ( has melanoma) Dr. Dirk June.      Phone: [phone]

## 2021-03-25 NOTE — TELEPHONE ENCOUNTER
Due to the nature of this type of skin cancer I will ask that clinic staff     1) complete the [employer] 1 page form. Status should be \"urgent\"    2) also with that form faxed the accompanying epic order to [facility] oncologist.    3) once done let patient know. She should still verify that her [employer] plan is helping with coverage of this visit before seeing her.

## 2021-04-20 ENCOUNTER — IMMUNIZATION (OUTPATIENT)
Dept: PRIMARY CARE CLINIC | Age: 56
End: 2021-04-20
Payer: OTHER GOVERNMENT

## 2021-04-20 PROCEDURE — 0011A COVID-19, MODERNA VACCINE 100MCG/0.5ML DOSE: CPT | Performed by: FAMILY MEDICINE

## 2021-04-20 PROCEDURE — 91301 COVID-19, MODERNA VACCINE 100MCG/0.5ML DOSE: CPT | Performed by: FAMILY MEDICINE

## 2021-04-28 ENCOUNTER — OFFICE VISIT (OUTPATIENT)
Dept: FAMILY MEDICINE CLINIC | Age: 56
End: 2021-04-28
Payer: OTHER GOVERNMENT

## 2021-04-28 VITALS
TEMPERATURE: 97.7 F | WEIGHT: 226 LBS | OXYGEN SATURATION: 96 % | RESPIRATION RATE: 18 BRPM | HEART RATE: 83 BPM | BODY MASS INDEX: 42.67 KG/M2 | HEIGHT: 61 IN | SYSTOLIC BLOOD PRESSURE: 136 MMHG | DIASTOLIC BLOOD PRESSURE: 68 MMHG

## 2021-04-28 DIAGNOSIS — E10.9 TYPE 1 DIABETES MELLITUS ON INSULIN THERAPY (HCC): ICD-10-CM

## 2021-04-28 DIAGNOSIS — E10.9 TYPE 1 DIABETES MELLITUS ON INSULIN THERAPY (HCC): Primary | ICD-10-CM

## 2021-04-28 PROCEDURE — 99213 OFFICE O/P EST LOW 20 MIN: CPT | Performed by: FAMILY MEDICINE

## 2021-04-28 SDOH — ECONOMIC STABILITY: TRANSPORTATION INSECURITY
IN THE PAST 12 MONTHS, HAS LACK OF TRANSPORTATION KEPT YOU FROM MEETINGS, WORK, OR FROM GETTING THINGS NEEDED FOR DAILY LIVING?: NO

## 2021-04-28 SDOH — ECONOMIC STABILITY: FOOD INSECURITY: WITHIN THE PAST 12 MONTHS, THE FOOD YOU BOUGHT JUST DIDN'T LAST AND YOU DIDN'T HAVE MONEY TO GET MORE.: NEVER TRUE

## 2021-04-28 SDOH — ECONOMIC STABILITY: INCOME INSECURITY: HOW HARD IS IT FOR YOU TO PAY FOR THE VERY BASICS LIKE FOOD, HOUSING, MEDICAL CARE, AND HEATING?: NOT HARD AT ALL

## 2021-04-28 SDOH — ECONOMIC STABILITY: TRANSPORTATION INSECURITY
IN THE PAST 12 MONTHS, HAS THE LACK OF TRANSPORTATION KEPT YOU FROM MEDICAL APPOINTMENTS OR FROM GETTING MEDICATIONS?: NO

## 2021-04-28 ASSESSMENT — PATIENT HEALTH QUESTIONNAIRE - PHQ9
SUM OF ALL RESPONSES TO PHQ9 QUESTIONS 1 & 2: 0
1. LITTLE INTEREST OR PLEASURE IN DOING THINGS: 0
SUM OF ALL RESPONSES TO PHQ QUESTIONS 1-9: 0

## 2021-04-28 NOTE — PATIENT INSTRUCTIONS
CONTINUE PARTNERING WITH [facility] FOR THE MELANOMA SURVEILLANCE. YOUR PCP AGREES WITH YOU THAT THE RISK FOR THE NECESSARY STEROIDS FOR ONE OF THE IMMUNOTHERAPY TREATMENTS WOULD BE TOO RISKY. If you feel the blood pressure is consistently higher, let your PCP know if you want an 10 mg benazepril added to the 20 mg. GET LABWORK DONE. FASTING NOT REQUIRED.

## 2021-04-28 NOTE — PROGRESS NOTES
[Atorvastatin] Other (See Comments)     Joint pain     Prior to Visit Medications    Medication Sig Taking? Authorizing Provider   famotidine (PEPCID) 20 MG tablet Take 1-2 tablets by mouth 2 times daily Yes Lokesh Aguila DO   benazepril (LOTENSIN) 20 MG tablet TAKE AS INSTRUCTED BY YOUR PRESCRIBER Yes Lokesh Aguila DO   insulin glargine (LANTUS) 100 UNIT/ML injection vial INJECT 35 UNITS INTO THE SKIN NIGHTLY Yes Lokesh Aguila DO   Insulin Syringe-Needle U-100 (B-D INS SYRINGE 0.5CC/30GX1/2\") 30G X 1/2\" 0.5 ML MISC USE TO INJECT UNDER THE SKIN 6 TIMES DAILY Yes Lokesh Aguila DO   rosuvastatin (CRESTOR) 20 MG tablet Take 1 tablet by mouth nightly Yes Ryan Flores, DO   blood glucose monitor strips Test 7-8 times a day & as needed for symptoms of irregular blood glucose. Dispense sufficient amount for indicated testing frequency plus additional to accommodate PRN testing needs. Yes Lokesh Aguila, DO   Blood Glucose Monitoring Suppl (FREESTYLE FREEDOM LITE) w/Device KIT 1 kit by Does not apply route 4 times daily (before meals and nightly) Yes Lokesh Aguila DO   celecoxib (CELEBREX) 200 MG capsule TAKE 1 CAPSULE DAILY,   ON DAYS OF SEVERE PAIN, MAY TAKE 1 CAPSULE TWICE A DAY Yes Lokesh Aguila DO   FreeStyle Lancets MISC 1 each by Does not apply route 8 times daily Yes Ryan Flores, DO   insulin lispro (HUMALOG) 100 UNIT/ML injection vial Sliding scale Yes Lokesh Aguila DO   metoprolol succinate (TOPROL XL) 25 MG extended release tablet Take 1 tablet by mouth daily Yes ADAN Ochoa - CNP   Handicap Placard MISC by Does not apply route Dx of bilateral knee osteoarthritis. Effective Feb 25, 2019 until Feb 25, 2025. Yes Lokesh Aguila DO   aspirin 81 MG EC tablet Take 81 mg by mouth daily.  Yes Historical Provider, MD          Vitals:    04/28/21 1121 04/28/21 1146   BP: (!) 158/72 136/68   Pulse: 83 Resp: 18    Temp: 97.7 °F (36.5 °C)    TempSrc: Temporal    SpO2: 96%    Weight: 226 lb (102.5 kg)    Height: 5' 1\" (1.549 m)       Wt Readings from Last 3 Encounters:   04/28/21 226 lb (102.5 kg)   09/17/20 222 lb 6.4 oz (100.9 kg)   02/04/20 215 lb (97.5 kg)     BP Readings from Last 3 Encounters:   04/28/21 136/68   09/17/20 (!) 142/84   02/04/20 (!) 98/55       Patient Active Problem List   Diagnosis    History of carpal tunnel syndrome    HYPERLIPIDEMIA    Type 1 diabetes mellitus ([facility] [address] -- diagnosed as 7year-old    Lumbar disc disease    Palpitations    Arthritis, hip    Shoulder pain, bilateral    Murmur    Edema    Vertigo    BMI 40.0-44.9, adult (HCC)    Trigger middle finger of left hand    Type 1 diabetes mellitus on insulin therapy ([facility] [facility]    Dyslipidemia    Diastolic dysfunction without heart failure    Multiple thyroid nodules       Immunization History   Administered Date(s) Administered    COVID-19, Moderna, PF, 100mcg/0.5mL 04/20/2021    Hepatitis A 09/26/2013    Influenza 10/31/2011, 09/26/2013    Influenza Vaccine, unspecified formulation 10/31/2011, 09/26/2013    Influenza Virus Vaccine 09/23/2009, 03/01/2016    Influenza, Quadv, IM, PF (6 mo and older Fluzone, Flulaval, Fluarix, and 3 yrs and older Afluria) 11/09/2016       Past Medical History:   Diagnosis Date    Acute upper respiratory infections of unspecified site 5/11/2010    Anxiety state, unspecified 5/11/2010    Arthritis     Carpal tunnel syndrome 5/11/2010    Ingrowing nail 5/11/2010    Multiple thyroid nodules 1/23/2019    Other and unspecified hyperlipidemia 5/11/2010    Pain in joint, pelvic region and thigh 5/11/2010    PONV (postoperative nausea and vomiting)     Screening 5/13/2010    Type I (juvenile type) diabetes mellitus without mention of complication, not stated as uncontrolled 5/11/2010    Unspecified arthropathy, ankle and foot 5/11/2010    Unspecified essential hypertension 5/11/2010    Urinary tract infection, site not specified 2010    Wrist fracture     left     Past Surgical History:   Procedure Laterality Date    CARPAL TUNNEL RELEASE Bilateral      SECTION      FINGER TRIGGER RELEASE      3 of them    KNEE SURGERY      SHOULDER ARTHROSCOPY Right 2018    RIGHT SHOULDER ARTHROSCOPY, DEBRIDEMENT, DECOMPRESSION WITH    TUBAL LIGATION      WRIST SURGERY  3/08     wrist fracture     Family History   Problem Relation Age of Onset    Asthma Mother     Other Mother         history of blood clots    Cancer Paternal Grandmother         throat    Birth Defects Neg Hx     Depression Neg Hx     Early Death Neg Hx     High Blood Pressure Neg Hx      Social History     Socioeconomic History    Marital status:      Spouse name: Not on file    Number of children: 3    Years of education: Not on file    Highest education level: Not on file   Occupational History    Occupation: stay at home Mom   Social Needs    Financial resource strain: Not hard at all   Zignals insecurity     Worry: Never true     Inability: Never true    Transportation needs     Medical: No     Non-medical: No   Tobacco Use    Smoking status: Never Smoker    Smokeless tobacco: Never Used   Substance and Sexual Activity    Alcohol use: No    Drug use: No    Sexual activity: Not on file   Lifestyle    Physical activity     Days per week: Not on file     Minutes per session: Not on file    Stress: Not on file   Relationships    Social connections     Talks on phone: Not on file     Gets together: Not on file     Attends Denominational service: Not on file     Active member of club or organization: Not on file     Attends meetings of clubs or organizations: Not on file     Relationship status: Not on file    Intimate partner violence     Fear of current or ex partner: Not on file     Emotionally abused: Not on file     Physically abused: Not on file     Forced sexual activity: Not on file   Other Topics Concern    Not on file   Social History Narrative    Not on file       O: /68   Pulse 83   Temp 97.7 °F (36.5 °C) (Temporal)   Resp 18   Ht 5' 1\" (1.549 m)   Wt 226 lb (102.5 kg)   SpO2 96%   BMI 42.70 kg/m²   Physical Exam  GEN: No acute distress,cooperative, well nourished, alert. HEENT: PEERLA, EOMI , normocephalic/atraumatic, external nose appears normal.  External ear is normal.    Neck: soft, supple, no appreciable thyromegaly,mass  CV: No upper extremity edema. Resp:  Breathing comfortably. Psych:normal affect. Neuro: AOx3  Other Pertinent Physical Exam findings: n/a      ASSESSMENT   Diagnosis Orders   1. Type 1 diabetes mellitus on insulin therapy (HCC)  Hemoglobin A1C    COMPREHENSIVE METABOLIC PANEL       Orders in system. She will plan to get the lab work done. Think she is grieving appropriately from her father's passing. We discussed her cancer diagnosis. She will get back to me if she needs anything else. Plan to follow-up in approximately 6 months unless needed sooner. PLAN          Time spent on encounter (to include any same day medical record review): 28minutes  Established E/M: 10-19 (87943), 20-29 (51162), 30-39 (01919), 40-54 (77919)   New E/M: 15-29 (93568), 30-44 (76600), 45-59 (70626), 60-74 (84199)  Telephone E/M: 5-10 (45447), 11-20 (05432), 21-30 (89209)    If applicable, see additional patient information and instructions under \"Patient Instructions. \"    Return in about 6 months (around 10/28/2021) for Diabetes. Patient Instructions   CONTINUE PARTNERING WITH [facility] FOR THE MELANOMA SURVEILLANCE. YOUR PCP AGREES WITH YOU THAT THE RISK FOR THE NECESSARY STEROIDS FOR ONE OF THE IMMUNOTHERAPY TREATMENTS WOULD BE TOO RISKY. If you feel the blood pressure is consistently higher, let your PCP know if you want an 10 mg benazepril added to the 20 mg. GET LABWORK DONE. FASTING NOT REQUIRED.         Please note a portion of this chart was generated using dragon dictation software. Although every effort was made to ensure the accuracy of this automated transcription,some errors in transcription may have occurred.

## 2021-04-29 LAB
A/G RATIO: 2 (ref 1.1–2.2)
ALBUMIN SERPL-MCNC: 4.3 G/DL (ref 3.4–5)
ALP BLD-CCNC: 91 U/L (ref 40–129)
ALT SERPL-CCNC: 11 U/L (ref 10–40)
ANION GAP SERPL CALCULATED.3IONS-SCNC: 9 MMOL/L (ref 3–16)
AST SERPL-CCNC: 14 U/L (ref 15–37)
BILIRUB SERPL-MCNC: 0.4 MG/DL (ref 0–1)
BUN BLDV-MCNC: 13 MG/DL (ref 7–20)
CALCIUM SERPL-MCNC: 9.2 MG/DL (ref 8.3–10.6)
CHLORIDE BLD-SCNC: 104 MMOL/L (ref 99–110)
CO2: 28 MMOL/L (ref 21–32)
CREAT SERPL-MCNC: 0.8 MG/DL (ref 0.6–1.1)
ESTIMATED AVERAGE GLUCOSE: 182.9 MG/DL
GFR AFRICAN AMERICAN: >60
GFR NON-AFRICAN AMERICAN: >60
GLOBULIN: 2.2 G/DL
GLUCOSE BLD-MCNC: 185 MG/DL (ref 70–99)
HBA1C MFR BLD: 8 %
POTASSIUM SERPL-SCNC: 4.4 MMOL/L (ref 3.5–5.1)
SODIUM BLD-SCNC: 141 MMOL/L (ref 136–145)
TOTAL PROTEIN: 6.5 G/DL (ref 6.4–8.2)

## 2021-05-04 DIAGNOSIS — E10.9 TYPE 1 DIABETES MELLITUS ON INSULIN THERAPY (HCC): ICD-10-CM

## 2021-05-07 ENCOUNTER — TELEPHONE (OUTPATIENT)
Dept: FAMILY MEDICINE CLINIC | Age: 56
End: 2021-05-07

## 2021-05-07 NOTE — TELEPHONE ENCOUNTER
Received letter via mail from [employer] that they are denying the request for authorization of the [employer] MI Profiling. According to the letter this is not appealable since the Department of Defense has no authority to cost-share non-FDA covered medical devices. Attached letter from [employer] to this telephone encounter.

## 2021-05-11 DIAGNOSIS — E10.9 TYPE 1 DIABETES MELLITUS ON INSULIN THERAPY (HCC): ICD-10-CM

## 2021-05-11 NOTE — TELEPHONE ENCOUNTER
[facility] called requesting clarification on Patito's prescription for insulin lispro (Humalog) 100 unit/mL. The directions just state \"USE PER SLIDING SCALE. \"  They need to know the max units per day in order to dispense the correct amount for the pt. Can these directions be clarified? Please advise. Thanks.         [facility] [phone]  Ref # [id number]

## 2021-05-11 NOTE — TELEPHONE ENCOUNTER
Can clinic staff kindly reach out to patient and explain that we do need a maximum amount of units for every 24 hours. With the maximum that she has needed recently in a 24-hour window? I will likely add 10 more units to that amount. Please route back to me when she has this info so I can resend the prescription.

## 2021-05-11 NOTE — TELEPHONE ENCOUNTER
Pt states she takes anywhere between 6-10 units per 3 meals in 24 hours so 30 units would be a max dose per day. Pt also needs free style test strips filled.    Test strips pending  Thank you

## 2021-05-12 RX ORDER — GLUCOSAMINE HCL/CHONDROITIN SU 500-400 MG
CAPSULE ORAL
Qty: 800 STRIP | Refills: 3 | Status: SHIPPED | OUTPATIENT
Start: 2021-05-12

## 2021-05-12 NOTE — TELEPHONE ENCOUNTER
1) can clinic staff call [facility] to let them know I resent  Humalog rx with clarification of max units of 40 units/24h? 2) Let Alyce Holder know by phone or "[employer] Simple[employer] Fast [employer] E[employer] Tablet Sales App"hart that rx for Humalog and test strips sent [facility].

## 2021-05-18 ENCOUNTER — IMMUNIZATION (OUTPATIENT)
Dept: PRIMARY CARE CLINIC | Age: 56
End: 2021-05-18
Payer: OTHER GOVERNMENT

## 2021-05-18 PROCEDURE — 0012A COVID-19, MODERNA VACCINE 100MCG/0.5ML DOSE: CPT

## 2021-05-18 PROCEDURE — 91301 COVID-19, MODERNA VACCINE 100MCG/0.5ML DOSE: CPT

## 2021-06-08 ENCOUNTER — PATIENT MESSAGE (OUTPATIENT)
Dept: FAMILY MEDICINE CLINIC | Age: 56
End: 2021-06-08

## 2021-06-08 DIAGNOSIS — E10.9 TYPE 1 DIABETES MELLITUS ON INSULIN THERAPY (HCC): Primary | ICD-10-CM

## 2021-06-08 NOTE — TELEPHONE ENCOUNTER
From: Eliz Brenner  To: Darlenemariola Sow DO  Sent: 6/8/2021 9:45 AM EDT  Subject: Non-Urgent Medical Question    Good Morning  My Oncologist Dr. Adonay Gay and I have decided to move forward with some preventive meds to help prevent my cancer from returning. These meds should not interfere to much with the diabetes. I will be taking Mekinist and [place]. I have done a lot of pre-testing for base lines to keep a close eye on all the base lines like blood, ekg, and echo. But they also want me to have eye exam from Dr. Saint Sports and I need a referral for this one. I am schedule to do this Monday, June 14, so if possible can you send in a 24 hour referral for this?   Dr. Saint Sports  [address] [address] [phone]  If you have any questions I can be reached at [phone]    Thanks    [facility]

## 2021-06-09 ENCOUNTER — PATIENT MESSAGE (OUTPATIENT)
Dept: FAMILY MEDICINE CLINIC | Age: 56
End: 2021-06-09

## 2021-06-09 NOTE — TELEPHONE ENCOUNTER
Please fax the referral from EMR. Also with her insurance plan she needs a [employer] form completed and faxed. Due to June 14 appointment coming up please check the urgent 24-hour or less request.    Once done then let patient know by phone or [address] Let her know that she needs to receive confirmation from her insurance plan about coverage before actually seeing the doctor.

## 2021-06-09 NOTE — TELEPHONE ENCOUNTER
From: Joe Meredith  To: Severino Rebolledo DO  Sent: 6/9/2021 9:25 AM EDT  Subject: Non-Urgent Medical Question    Good Morning  I believe you put in the referral for SAV Curran but I gave you the wrong location. He has 2 locations and they do not bill the same. I need the referral to be for the following   Dr. Yanely Curran  [address] [place], [place] [phone]  I need this done as a 24 hr referral because my appointment is on Monday June 14. It is for a baseline exam because I am starting treatment for my Melanoma. I am very sorry for the confusion on my end.     Thank you    Nancy Stewart [phone]

## 2021-06-10 NOTE — TELEPHONE ENCOUNTER
The corrected address for Dr Taylor Ledezma ([address] [place] [address]) has been APPROVED 6/8/21 to 6/8/22. Please inform pt as her appt is on Monday June 14. Document attached.

## 2021-07-07 ENCOUNTER — TELEPHONE (OUTPATIENT)
Dept: FAMILY MEDICINE CLINIC | Age: 56
End: 2021-07-07

## 2021-07-07 NOTE — TELEPHONE ENCOUNTER
Please update Jeremi Cristobal about health insurance denial coverage. Would she want us to route to her dermatologist? Please confirm who that is.

## 2021-07-07 NOTE — TELEPHONE ENCOUNTER
Upheld DENIAL 6/15/21 - [id number] CDx Targeted Genomic Sequence Analysis Panel for Malignant Melanoma - [employer]     Please inform pt of this decision. Document attached.

## 2021-08-12 ENCOUNTER — TELEPHONE (OUTPATIENT)
Dept: FAMILY MEDICINE CLINIC | Age: 56
End: 2021-08-12

## 2021-08-12 NOTE — TELEPHONE ENCOUNTER
APPROVED Bronchoscopy w/Dr Natarajan @ [facility] 7/31/21 to 1/26/22 - [facility]    Please call pt and inform her of the approval.    Document attached.

## 2021-09-07 ENCOUNTER — OFFICE VISIT (OUTPATIENT)
Dept: FAMILY MEDICINE CLINIC | Age: 56
End: 2021-09-07
Payer: OTHER GOVERNMENT

## 2021-09-07 ENCOUNTER — PATIENT MESSAGE (OUTPATIENT)
Dept: FAMILY MEDICINE CLINIC | Age: 56
End: 2021-09-07

## 2021-09-07 ENCOUNTER — TELEPHONE (OUTPATIENT)
Dept: FAMILY MEDICINE CLINIC | Age: 56
End: 2021-09-07

## 2021-09-07 VITALS
WEIGHT: 229.2 LBS | RESPIRATION RATE: 16 BRPM | TEMPERATURE: 96.6 F | DIASTOLIC BLOOD PRESSURE: 78 MMHG | HEART RATE: 79 BPM | SYSTOLIC BLOOD PRESSURE: 130 MMHG | OXYGEN SATURATION: 95 % | BODY MASS INDEX: 43.31 KG/M2

## 2021-09-07 DIAGNOSIS — E10.9 TYPE 1 DIABETES MELLITUS ON INSULIN THERAPY (HCC): ICD-10-CM

## 2021-09-07 DIAGNOSIS — M25.552 LEFT HIP PAIN: Primary | ICD-10-CM

## 2021-09-07 PROCEDURE — 99214 OFFICE O/P EST MOD 30 MIN: CPT | Performed by: FAMILY MEDICINE

## 2021-09-07 PROCEDURE — 3052F HG A1C>EQUAL 8.0%<EQUAL 9.0%: CPT | Performed by: FAMILY MEDICINE

## 2021-09-07 RX ORDER — METOPROLOL SUCCINATE 25 MG/1
25 TABLET, EXTENDED RELEASE ORAL DAILY
Qty: 90 TABLET | Refills: 3 | Status: SHIPPED | OUTPATIENT
Start: 2021-09-07

## 2021-09-07 RX ORDER — LIDOCAINE 50 MG/G
1 PATCH TOPICAL DAILY
Qty: 30 PATCH | Refills: 0 | Status: SHIPPED | OUTPATIENT
Start: 2021-09-07 | End: 2021-10-07

## 2021-09-07 RX ORDER — CELECOXIB 200 MG/1
CAPSULE ORAL
Qty: 180 CAPSULE | Refills: 3 | Status: SHIPPED | OUTPATIENT
Start: 2021-09-07 | End: 2021-11-09

## 2021-09-07 NOTE — PATIENT INSTRUCTIONS
INQUIRE WITH YOUR ONCOLOGIST IF A STEROID INJECTION WOULD BE SAFE WITH YOUR CURRENT MEDICATION REGIMEN. KEEP APPT WITH DR. Elke Gordon UNLESS [facility] DOES NOT APPROVE.

## 2021-09-07 NOTE — PROGRESS NOTES
[facility]  Progress Note  Cami Hatch, DO Ksenia Jones  1965 09/07/21    Chief Complaint:   Ksenia Jones is a 55 y.o. female who is here for diabetes and left hip pain        HPI:   Has been checking her blood sugar more than 10 times a day to keep her blood sugar control. During her treatment to prevent future melanoma she was expecting her blood sugars please. She is pleased that her blood sugars have been staying under 200 on preprandial readings. She is try to get approval for the freestyle angelica 2. Insurance plan needs approval forms from me. Newer his left thigh/hip pain for the last 10 days or so. Ibuprofen is helpful at times. Celebrex is taken as well. She does not have any specific injury to the left hip. Her walk is affected. Limping. Lab Results   Component Value Date    LABA1C 8.0 04/28/2021     Lab Results   Component Value Date    .9 04/28/2021       ROS negative for headache, visionchanges, chest pain, shortness of breath, abdominal pain, urinary sx, bowel changes. Past medical, surgical, and social history reviewed. and allergies reviewed. Allergies   Allergen Reactions    Lipitor [Atorvastatin] Other (See Comments)     Joint pain     Prior to Visit Medications    Medication Sig Taking? Authorizing Provider   dabrafenib Mesylate (TAFINLAR) 50 MG CAPS capsule Take 150 mg by mouth every 12 hours (3 tablets 2x daily) Yes Historical Provider, MD   Trametinib Dimethyl Sulfoxide 0.5 MG TABS Take 2 mg by mouth daily (4 tablets daily) Yes Historical Provider, MD   celecoxib (CELEBREX) 200 MG capsule TAKE 1 CAPSULE DAILY,   ON DAYS OF SEVERE PAIN, MAY TAKE 1 CAPSULE TWICE A DAY Yes Yohannes Aguila, DO   metoprolol succinate (TOPROL XL) 25 MG extended release tablet Take 1 tablet by mouth daily Yes Alexx Aguila, DO   lidocaine (LIDODERM) 5 % Place 1 patch onto the skin daily for 30 doses 12 hours on, 12 hours off.  Yes Yariel Hyatt, DO   blood glucose monitor strips Test 7-8 times a day & as needed for symptoms of irregular blood glucose. Dispense sufficient amount for indicated testing frequency plus additional to accommodate PRN testing needs. Yes Darius Aguila DO   insulin lispro (HUMALOG) 100 UNIT/ML injection vial USE PER SLIDING SCALE. Do not exceed 40 units per 24 hours. Yes Darius Aguila DO   benazepril (LOTENSIN) 20 MG tablet TAKE AS INSTRUCTED BY YOUR PRESCRIBER Yes Darius Aguila DO   insulin glargine (LANTUS) 100 UNIT/ML injection vial INJECT 35 UNITS INTO THE SKIN NIGHTLY Yes Darius Aguila DO   Insulin Syringe-Needle U-100 (B-D INS SYRINGE 0.5CC/30GX1/2\") 30G X 1/2\" 0.5 ML MISC USE TO INJECT UNDER THE SKIN 6 TIMES DAILY Yes Darius Aguila DO   rosuvastatin (CRESTOR) 20 MG tablet Take 1 tablet by mouth nightly Yes Yariel Hyatt DO   Blood Glucose Monitoring Suppl (FREESTYLE FREEDOM LITE) w/Device KIT 1 kit by Does not apply route 4 times daily (before meals and nightly) Yes Darius Aguila DO   FreeStyle Lancets MISC 1 each by Does not apply route 8 times daily Yes Darius Aguila DO   Handicap Placard MISC by Does not apply route Dx of bilateral knee osteoarthritis. Effective Feb 25, 2019 until Feb 25, 2025. Yes Darius Aguila DO   aspirin 81 MG EC tablet Take 81 mg by mouth daily.  Yes Historical Provider, MD   famotidine (PEPCID) 20 MG tablet Take 1-2 tablets by mouth 2 times daily  Darius Aguila DO          Vitals:    09/07/21 0914   BP: 130/78   Pulse: 79   Resp: 16   Temp: 96.6 °F (35.9 °C)   TempSrc: Tympanic   SpO2: 95%   Weight: 229 lb 3.2 oz (104 kg)      Wt Readings from Last 3 Encounters:   09/07/21 229 lb 3.2 oz (104 kg)   04/28/21 226 lb (102.5 kg)   09/17/20 222 lb 6.4 oz (100.9 kg)     BP Readings from Last 3 Encounters:   09/07/21 130/78   04/28/21 136/68   09/17/20 (!) 142/84       Patient Active Problem List   Diagnosis    History of carpal tunnel syndrome    HYPERLIPIDEMIA    Type 1 diabetes mellitus ([facility] [address] -- diagnosed as 7year-old    Lumbar disc disease    Palpitations    Arthritis, hip    Shoulder pain, bilateral    Murmur    Edema    Vertigo    BMI 40.0-44.9, adult ([facility] [address] [facility]    Trigger middle finger of left hand    Type 1 diabetes mellitus on insulin therapy ([facility] [address] [facility]    Dyslipidemia    Diastolic dysfunction without heart failure    Multiple thyroid nodules       Immunization History   Administered Date(s) Administered    COVID-19, Moderna, PF, 100mcg/0.5mL 2021, 2021    Hepatitis A 2013    Influenza 10/31/2011, 2013    Influenza Vaccine, unspecified formulation 10/31/2011, 2013    Influenza Virus Vaccine 2009, 2016    Influenza, Quadv, IM, PF (6 mo and older Fluzone, Flulaval, Fluarix, and 3 yrs and older Afluria) 2016       Past Medical History:   Diagnosis Date    Acute upper respiratory infections of unspecified site 2010    Anxiety state, unspecified 2010    Arthritis     Carpal tunnel syndrome 2010    Ingrowing nail 2010    Multiple thyroid nodules 2019    Other and unspecified hyperlipidemia 2010    Pain in joint, pelvic region and thigh 2010    PONV (postoperative nausea and vomiting)     Screening 2010    Type I (juvenile type) diabetes mellitus without mention of complication, not stated as uncontrolled 2010    Unspecified arthropathy, ankle and foot 2010    Unspecified essential hypertension 2010    Urinary tract infection, site not specified 2010    Wrist fracture     left     Past Surgical History:   Procedure Laterality Date    CARPAL TUNNEL RELEASE Bilateral      SECTION      FINGER TRIGGER RELEASE      3 of them    KNEE SURGERY      SHOULDER ARTHROSCOPY Right 2018    RIGHT SHOULDER ARTHROSCOPY, DEBRIDEMENT, DECOMPRESSION WITH    TUBAL LIGATION  1990    WRIST SURGERY  3/08     wrist fracture     Family History   Problem Relation Age of Onset   Connye Sole Asthma Mother     Other Mother         history of blood clots    Cancer Paternal Grandmother         throat    Birth Defects Neg Hx     Depression Neg Hx     Early Death Neg Hx     High Blood Pressure Neg Hx      Social History     Socioeconomic History    Marital status:      Spouse name: Not on file    Number of children: 3    Years of education: Not on file    Highest education level: Not on file   Occupational History    Occupation: stay at home Mom   Tobacco Use    Smoking status: Never Smoker    Smokeless tobacco: Never Used   Substance and Sexual Activity    Alcohol use: No    Drug use: No    Sexual activity: Not on file   Other Topics Concern    Not on file   Social History Narrative    Not on file     Social Determinants of Health     Financial Resource Strain: Low Risk     Difficulty of Paying Living Expenses: Not hard at all   Food Insecurity: No Food Insecurity    Worried About Running Out of Food in the Last Year: Never true    Jasson of Food in the Last Year: Never true   Transportation Needs: No Transportation Needs    Lack of Transportation (Medical): No    Lack of Transportation (Non-Medical):  No   Physical Activity:     Days of Exercise per Week:     Minutes of Exercise per Session:    Stress:     Feeling of Stress :    Social Connections:     Frequency of Communication with Friends and Family:     Frequency of Social Gatherings with Friends and Family:     Attends Roman [employer] Services:     Active Member of Clubs or Organizations:     Attends Club or Organization Meetings:     Marital Status:    Intimate Partner Violence:     Fear of Current or Ex-Partner:     Emotionally Abused:     Physically Abused:     Sexually Abused:        O: /78   Pulse 79   Temp 96.6 °F (35.9 °C) (Tympanic)   Resp 16   Wt 229 lb 3.2 oz (104 kg)   SpO2 95%   Breastfeeding No   BMI 43.31 kg/m²   Physical Exam  GEN: No acute distress,cooperative, well nourished, alert. HEENT: PEERLA, EOMI , normocephalic/atraumatic, external nose appears normal.  External ear is normal.    Neck: soft, supple, no appreciable thyromegaly,mass  CV: No upper extremity edema. Resp:  Breathing comfortably. Psych:normal affect. Neuro: AOx3  Other Pertinent Physical Exam findings:   Positive Geronimo Mermentau on the left. Some mild tenderness to the left hip bursa. Gait is antalgic favoring the right. ASSESSMENT   Diagnosis Orders   1. Left hip pain  celecoxib (CELEBREX) 200 MG capsule    External Referral To Orthopedic Surgery    lidocaine (LIDODERM) 5 %   2. Type 1 diabetes mellitus on insulin therapy ([facility])  metoprolol succinate (TOPROL XL) 25 MG extended release tablet     #1:I offered corticosteroid injection. She is established with Ortho since the orthopedist which she would like to see. Will fax referral to the [address] [place] [address]. Appointment tentatively scheduled with orthopedics for Monday, September 13. Prescription renewed for a lidocaine patch. #2: The current medical regimen is effective;  continue present plan and medications. PLAN          Time spent on encounter (including any number of the following: review of labs, imaging, provider notes, outside hospital records; performing examination/evaluation; counseling patient and family; ordering medications/tests; placing referrals and communication with referring physicians; coordination of care, and documentation in the EHR): 26 minutes  Established E/M: 10-19 (10033), 20-29 (98333), 30-39 (24570), 40-54 (96911)   New E/M: 15-29 (75211), 30-44 (62731), 45-59 (21822), 60-74 (19044)  Telephone E/M: 5-10 ([email or web address]), 11-20 (27885), 21-30 (98728)    If applicable, see additional patient information and instructions under \"Patient Instructions. \"    Return in about 3 months (around 12/7/2021) for Diabetes. Patient Instructions   INQUIRE WITH YOUR ONCOLOGIST IF A STEROID INJECTION WOULD BE SAFE WITH YOUR CURRENT MEDICATION REGIMEN. KEEP APPT WITH DR. Samantha Blanchard UNLESS  DOES NOT APPROVE. Please note a portion of this chart was generated using dragon dictation software. Although every effort was made to ensure the accuracy of this automated transcription,some errors in transcription may have occurred.

## 2021-09-07 NOTE — TELEPHONE ENCOUNTER
I will asked my clinic team to reach out to [facility] for 2 reasons:    1) please fax the [facility] referral form for orthopedics. 2) I am working with patient to get approval for the freestyle angelica 2. I need to know specifically what the insurance plan needs from our clinic team to get the [place]a [place] to approve for Ms. Bleser. On her current regimen she needs to check her blood sugars more often. She has a medical reason to check her blood sugars more often. She is checking her blood sugar at least 12 times a day.

## 2021-09-08 NOTE — TELEPHONE ENCOUNTER
APPROVED Ortho/Dr Nito Centeno - [facility] 9/7/21 to 9/7/22 [facility]     Please inform pt of the approval.    Document attached.

## 2021-09-15 ENCOUNTER — PATIENT MESSAGE (OUTPATIENT)
Dept: FAMILY MEDICINE CLINIC | Age: 56
End: 2021-09-15

## 2021-09-15 DIAGNOSIS — E10.9 TYPE 1 DIABETES MELLITUS ON INSULIN THERAPY (HCC): Primary | ICD-10-CM

## 2021-09-15 NOTE — TELEPHONE ENCOUNTER
Please advise    Good Morning    On my visit last week we talked about getting approval for the [employer] 2. My insurance company said that I would qualify for this if Dr. Jose De Jesus Pierre sent in a request stating that I have had to increase my testing of sugar daily because of my cancer meds. Could you please let me know if you have submit this request to [facility]? If you have any other questions you can contact me at [phone]. Thank you very much for your time.     Jodi Brambila

## 2021-09-16 NOTE — TELEPHONE ENCOUNTER
This likely will require some investigation by our medical staff. I will ask one of our team members to contact Rip and inquire how I can get approval for the freestyle angelica for my patient Dileep Alonso. As she mentioned in the message due to the medication she is on for the melanoma she has to check her sugar more frequently through traditional glucometer fingersticks and this is causing problems with her fingers. The Johnie Paris would help her manage her insulin. What forms or documentation is needed?

## 2021-09-16 NOTE — TELEPHONE ENCOUNTER
Taisha/Efrain states that we need to send a referral for the DME and Clinical backing for why she needs it.  Please advise  Thank you

## 2021-09-17 NOTE — TELEPHONE ENCOUNTER
Completed the 'patient referral authorization form' for . Attached form, prescription for the [facility] and last office note.     Scanned into media

## 2021-09-17 NOTE — TELEPHONE ENCOUNTER
Please see \"other tab\" and fax this DME order to [employer]. Then let Cornelio Parents know we submitted add'l documentation asking for approval due to the medical necessity for the [facility] [place]. She can call them and check status for approval sometime around Sept 22 onwards.

## 2021-09-21 NOTE — TELEPHONE ENCOUNTER
New order for [facility] 2 faxed to [address] [place] [address].

## 2021-09-21 NOTE — TELEPHONE ENCOUNTER
Pt requesting Freestyle Law 2     I noticed you said on form for [facility]. I requested to get a Law 2. there is a big difference in machines. The 2 can be set for alarming, the other can not.       Please advise  Thank you

## 2021-09-21 NOTE — TELEPHONE ENCOUNTER
In the event that the insurance plan needs that DME order to specifically state Seda Carvalho \"2\" then I revised the DME order as of today September 21. Please see DME prescription order under the others tab and fax back to same [facility] fax number. Once done let patient know. So she can check with them. I apologize about the back-and-forth.

## 2021-09-23 ENCOUNTER — TELEPHONE (OUTPATIENT)
Dept: FAMILY MEDICINE CLINIC | Age: 56
End: 2021-09-23

## 2021-09-23 DIAGNOSIS — E10.9 TYPE 1 DIABETES MELLITUS ON INSULIN THERAPY (HCC): Primary | ICD-10-CM

## 2021-09-23 RX ORDER — FLASH GLUCOSE SENSOR
1 KIT MISCELLANEOUS CONTINUOUS PRN
Qty: 2 EACH | Refills: 5 | Status: SHIPPED | OUTPATIENT
Start: 2021-09-23 | End: 2022-02-17 | Stop reason: SDUPTHER

## 2021-09-23 RX ORDER — FLASH GLUCOSE SCANNING READER
1 EACH MISCELLANEOUS CONTINUOUS PRN
Qty: 1 EACH | Refills: 0 | Status: SHIPPED | OUTPATIENT
Start: 2021-09-23 | End: 2021-09-23 | Stop reason: ALTCHOICE

## 2021-09-23 NOTE — TELEPHONE ENCOUNTER
Pt advised. Pt is wanting to know if there needs to be a rx sent and if there is can she get the [facility] [employer] 2 device. Please advise.    Thank you

## 2021-09-23 NOTE — TELEPHONE ENCOUNTER
Pt states the rx needs to be sent to [facility], she states that it has to be the sensor and transmitor for the [employer] 2 device. She doesn't need the reader.  Please advise  Thank you

## 2021-09-23 NOTE — TELEPHONE ENCOUNTER
Please let patient know by phone or MyChart that we received the approval from her [facility] [place] plan about the approval of the continuous glucose meter.

## 2021-09-24 NOTE — TELEPHONE ENCOUNTER
I will ask staff assistance. I think what Avtar Watson is talking about needs to be reflected on the referral forms (we have copies in the office in [facility]):    [email or web address] pdf

## 2021-09-24 NOTE — TELEPHONE ENCOUNTER
Baltazar Kurtz called stating she spoke with Taisha and they will only cover her Freestyle angelica 2 device at [facility] if our office does a referral for \"necessity out of network facility/pharmacy to be used because of distance and availability. \"  If Johannyus Kurtz does not get this referral, she will have to go to a pharmacy over an hour away. Please advise. Thanks.         Baltazar Kurtz [phone] (home)

## 2021-10-06 ENCOUNTER — TELEPHONE (OUTPATIENT)
Dept: FAMILY MEDICINE CLINIC | Age: 56
End: 2021-10-06

## 2021-10-06 NOTE — LETTER
[address]  Phone: [phone]  Fax: [phone]    Daisy Courtney,         October 7, 2021      To whom this may concern,  My patient Mary Mcginnis needs her [facility] 2 sensor sent to [facility] [address], [address] [facility] [phone] due to her residing in [place]. She is a type 1 diabetic and needs the continuous glucose monitor to frequently change the amount of Humalog that she will need. If you have any questions please give out office a call at [phone].     Sincerely,        Daisy Courtney, DO

## 2021-10-06 NOTE — TELEPHONE ENCOUNTER
I will need to call the insurance tomorrow and see what needs to happen for her to receive this at the [facility] of her chose.  Marking new

## 2021-10-06 NOTE — LETTER
[address]  Phone: [phone]  Fax: [phone]    Monroe Matthews,         October 7, 2021      To whom this may concern,  My patient Michelle Sigala needs her [facility] 2 sensor sent to [facility] [address], [address] [facility] [phone] due to her residing in [place]. She is a type 1 diabetic and needs the continuous glucose monitor to frequently change the amount of Humalog that she will need. If you have any questions please give out office a call at [phone].       Sincerely,        Monroe Matthews, DO

## 2021-10-06 NOTE — TELEPHONE ENCOUNTER
APPROVED Reciever Dedicated for Use w/Therapeutic Supply Allow for [address] [place] SPL + 1 U OF 9/23/21 - [address]    Approved for [address], [place]    Please inform pt of this change. Document attached.

## 2021-10-06 NOTE — TELEPHONE ENCOUNTER
Once the insurance has given notification about the approval,  then all the clinic staff needs to do is to notify the patient about the approval.    It is fine to give me an FYI, but my permission is not needed to notify the patient about the approval.

## 2021-10-07 NOTE — TELEPHONE ENCOUNTER
Please add in the letter that she needs the continuous glucose monitor due to the need to frequently adjust the amount of humalog needed as a Type 1 diabetic.

## 2021-10-07 NOTE — TELEPHONE ENCOUNTER
I was informed by the insurance that due to [facility] not being within their network, they will need a letter from Dr. Marisa Burgess explaining why she needs the [facility] 2 sensor sent to the [facility] [address], [address] [place] Heena [address] 1996 [address]   [address] [place] [address]   Phone:  [phone]  Fax:  [phone]. The letter has to state that she does not live in [facility], she lives in [place] and has to have it sent there so she can have access to the sensor. This letter has to be signed and dated.  Please advise  Thank you

## 2021-10-11 ENCOUNTER — TELEPHONE (OUTPATIENT)
Dept: FAMILY MEDICINE CLINIC | Age: 56
End: 2021-10-11

## 2021-10-11 NOTE — TELEPHONE ENCOUNTER
Tee Bell called requesting to speak with [facility] regarding her  referral.  Please call Tee Bell back at [phone]. Thanks.

## 2021-10-22 ENCOUNTER — TELEPHONE (OUTPATIENT)
Dept: FAMILY MEDICINE CLINIC | Age: 56
End: 2021-10-22

## 2021-10-22 NOTE — TELEPHONE ENCOUNTER
Pt called stating that her Dermatologist office called her and hour before her appt to tell her that the referral that was put into the system was . I got permission from Dr. Gustavo Rees to start a verbal referral. That was completed through [address] [place] [address]. Pt was advised.

## 2021-10-28 ENCOUNTER — TELEPHONE (OUTPATIENT)
Dept: FAMILY MEDICINE CLINIC | Age: 56
End: 2021-10-28

## 2021-10-28 NOTE — TELEPHONE ENCOUNTER
----- Message from Serenity Rinaldi sent at 10/28/2021  8:30 AM EDT -----  Subject: Appointment Request    Reason for Call: Urgent (Patient Request) No Script    QUESTIONS  Type of Appointment? Established Patient  Reason for appointment request? No appointments available during search  Additional Information for Provider? Patient is wanting to be seen for   sweating and chill for about 8 days, one of her doctors took her off   medication since that was side effect but it still happening and wants to   know what she needs to do.  ---------------------------------------------------------------------------  --------------  [address]  What is the best way for the office to contact you? OK to leave message on   voicemail  Preferred Call Back Phone Number? [phone]  ---------------------------------------------------------------------------  --------------  SCRIPT ANSWERS  Relationship to Patient? Self  (Is the patient requesting to see the provider for a procedure?)? No  (Is the patient requesting to see the provider urgently  today or   tomorrow. )? Yes  Have you been diagnosed with, awaiting test results for, or told that you   are suspected of having COVID-19 (Coronavirus)? (If patient has tested   negative or was tested as a requirement for work, school, or travel and   not based on symptoms, answer no)? No  Within the past two weeks have you developed any of the following symptoms   (answer no if symptoms have been present longer than 2 weeks or began   more than 2 weeks ago)? Fever or Chills, Cough, Shortness of breath or   difficulty breathing, Loss of taste or smell, Sore throat, Nasal   congestion, Sneezing or runny nose, Fatigue or generalized body aches   (answer no if pain is specific to a body part e.g. back pain), Diarrhea,   Headache?  Yes

## 2021-10-28 NOTE — TELEPHONE ENCOUNTER
Due to the sweating and chills would you like her to do a VV or an inperson?  Please advise  Thank you

## 2021-10-29 ENCOUNTER — VIRTUAL VISIT (OUTPATIENT)
Dept: FAMILY MEDICINE CLINIC | Age: 56
End: 2021-10-29
Payer: OTHER GOVERNMENT

## 2021-10-29 DIAGNOSIS — E10.9 TYPE 1 DIABETES MELLITUS ON INSULIN THERAPY (HCC): ICD-10-CM

## 2021-10-29 DIAGNOSIS — C43.9 MALIGNANT MELANOMA, UNSPECIFIED SITE (HCC): ICD-10-CM

## 2021-10-29 DIAGNOSIS — E78.5 DYSLIPIDEMIA: ICD-10-CM

## 2021-10-29 DIAGNOSIS — R68.83 CHILLS: Primary | ICD-10-CM

## 2021-10-29 DIAGNOSIS — R53.83 FATIGUE, UNSPECIFIED TYPE: ICD-10-CM

## 2021-10-29 PROCEDURE — 3052F HG A1C>EQUAL 8.0%<EQUAL 9.0%: CPT | Performed by: FAMILY MEDICINE

## 2021-10-29 PROCEDURE — 99214 OFFICE O/P EST MOD 30 MIN: CPT | Performed by: FAMILY MEDICINE

## 2021-10-29 NOTE — PROGRESS NOTES
10/29/2021    TELEHEALTH EVALUATION -- Audio/Visual (During JTKHN-[id number] public health emergency)    Due to COVID 19 outbreak, patient's office visit was converted to a virtual visit. Patient was contacted and agreed to proceed with a virtual visit via Doxy. me  The risks and benefits of converting to a virtual visit were discussed in light of the current infectious disease epidemic. Patient also understood that insurance coverage and co-pays are up to their individual insurance plans. HPI:    Yandy Drummond (:  1965) has requested an audio/video evaluation for the following concern(s):    Fever off and on since 10/20/21, but mostly chills. Last fever was on Wednesday Tm = 99.5. She is on oral chemo for Melanoma ,which spread to a lymph node. She was to be on Zhaopin and [employer] Integrien for 1 year. She takes Tafinlar bid and Mekinst once per day . She was taken off of both due to chills and fever. She has had side effects similar in the past, but usually within 1-2 days and she restarts the medications. She has been on both for 5 months. Oncologist is Dr. Fuad Bush . Mild headache . No cough or nasal congestion or sore throat . She had a negative COVID test on Monday at [facility] and results on Tuesday. No urinary frequency or dysuria. Slight cloudy urine , but no odor or hematuria. Denies abdominal pain. She continues to have chills despite not having a fever. Slight nausea. Appetite is decreased somewhat. No vomiting, diarrhea . Her bowel movements are q 2 days and normal consistency. Her Blood sugars have been fluctuating 240 -70. Last ZitO3d=0.0 in . She is now using [address] for Blood sugar monitoring. Review of Systems    Prior to Visit Medications    Medication Sig Taking?  Authorizing Provider   Continuous Blood Gluc Sensor (FREESTYLE GREG 2 SENSOR) MISC 1 each by Does not apply route continuous prn (blood sugar monitoring.) Yes Toñasa Rein Bingcang, DO   dabrafenib Mesylate (TAFINLAR) 50 MG CAPS capsule Take 150 mg by mouth every 12 hours (3 tablets 2x daily) Yes Historical Provider, MD   Trametinib Dimethyl Sulfoxide 0.5 MG TABS Take 2 mg by mouth daily (4 tablets daily) Yes Historical Provider, MD   celecoxib (CELEBREX) 200 MG capsule TAKE 1 CAPSULE DAILY,   ON DAYS OF SEVERE PAIN, MAY TAKE 1 CAPSULE TWICE A DAY Yes Yohannes Aguila, DO   metoprolol succinate (TOPROL XL) 25 MG extended release tablet Take 1 tablet by mouth daily Yes Steven Eason, DO   blood glucose monitor strips Test 7-8 times a day & as needed for symptoms of irregular blood glucose. Dispense sufficient amount for indicated testing frequency plus additional to accommodate PRN testing needs. Yes Ian Aguila, DO   insulin lispro (HUMALOG) 100 UNIT/ML injection vial USE PER SLIDING SCALE. Do not exceed 40 units per 24 hours. Yes Ian Aguila, DO   benazepril (LOTENSIN) 20 MG tablet TAKE AS INSTRUCTED BY YOUR PRESCRIBER Yes Ian Aguila, DO   insulin glargine (LANTUS) 100 UNIT/ML injection vial INJECT 35 UNITS INTO THE SKIN NIGHTLY Yes Ian Aguila DO   Insulin Syringe-Needle U-100 (B-D INS SYRINGE 0.5CC/30GX1/2\") 30G X 1/2\" 0.5 ML MISC USE TO INJECT UNDER THE SKIN 6 TIMES DAILY Yes Ian Aguila DO   rosuvastatin (CRESTOR) 20 MG tablet Take 1 tablet by mouth nightly Yes Steven Eason, DO   Blood Glucose Monitoring Suppl (FREESTYLE FREEDOM LITE) w/Device KIT 1 kit by Does not apply route 4 times daily (before meals and nightly) Yes Ian Aguila DO   FreeStyle Lancets MISC 1 each by Does not apply route 8 times daily Yes Ian Aguila DO   Handicap Placard MISC by Does not apply route Dx of bilateral knee osteoarthritis. Effective Feb 25, 2019 until Feb 25, 2025. Yes Ian Aguila DO   aspirin 81 MG EC tablet Take 81 mg by mouth daily.  Yes Historical Provider, MD   famotidine (PEPCID) 20 MG tablet Take 1-2 tablets by mouth 2 times daily  Reena Pedlar, DO       Allergies   Allergen Reactions    Lipitor [Atorvastatin] Other (See Comments)     Joint pain       Social History     Tobacco Use    Smoking status: Never Smoker    Smokeless tobacco: Never Used   Substance Use Topics    Alcohol use: No    Drug use: No        Past Medical History:   Diagnosis Date    Acute upper respiratory infections of unspecified site 2010    Anxiety state, unspecified 2010    Arthritis     Carpal tunnel syndrome 2010    Ingrowing nail 2010    Multiple thyroid nodules 2019    Other and unspecified hyperlipidemia 2010    Pain in joint, pelvic region and thigh 2010    PONV (postoperative nausea and vomiting)     Screening 2010    Type I (juvenile type) diabetes mellitus without mention of complication, not stated as uncontrolled 2010    Unspecified arthropathy, ankle and foot 2010    Unspecified essential hypertension 2010    Urinary tract infection, site not specified 2010    Wrist fracture     left       Past Surgical History:   Procedure Laterality Date    CARPAL TUNNEL RELEASE Bilateral      SECTION      FINGER TRIGGER RELEASE      3 of them    KNEE SURGERY      SHOULDER ARTHROSCOPY Right 2018    RIGHT SHOULDER ARTHROSCOPY, DEBRIDEMENT, DECOMPRESSION WITH    TUBAL LIGATION      WRIST SURGERY  3/08     wrist fracture       Health Maintenance   Topic Date Due    Hepatitis C screen  Never done    Pneumococcal 0-64 years Vaccine (1 of 2 - PPSV23) Never done    Hepatitis B vaccine (1 of 3 - Risk 3-dose series) Never done    DTaP/Tdap/Td vaccine (1 - Tdap) Never done    Cervical cancer screen  Never done    Colon cancer screen colonoscopy  Never done    Shingles Vaccine (1 of 2) Never done    Diabetic foot exam  2017    Diabetic microalbuminuria test  2021    Flu vaccine (1) 2021    Lipid screen  2021    Breast cancer screen  02/25/2022    A1C test (Diabetic or Prediabetic)  04/28/2022    Potassium monitoring  04/28/2022    Creatinine monitoring  04/28/2022    Diabetic retinal exam  08/25/2022    COVID-19 Vaccine  Completed    Hepatitis A vaccine  Aged Out    Hib vaccine  Aged Out    Meningococcal (ACWY) vaccine  Aged Out    HIV screen  Discontinued       Family History   Problem Relation Age of Onset    Asthma Mother     Other Mother         history of blood clots    Cancer Paternal Grandmother         throat    Birth Defects Neg Hx     Depression Neg Hx     Early Death Neg Hx     High Blood Pressure Neg Hx        PHYSICAL EXAMINATION:    Vital Signs: (As obtained by patient/caregiver or practitioner observation)     Patient-Reported Vitals 10/29/2021   Patient-Reported Weight 224   Patient-Reported Systolic 547   Patient-Reported Diastolic 59       Heart rate= 80  Respiratory rate= 14 Temperature= 98         Constitutional:  Appears well-developed and well-nourished. No apparent distress                              Mental status:  Alert and awake. Oriented to person/place/time. Able to follow commands       Eyes: EOM intact. Sclera-normal. No erythema of conjunctiva. No eye discharge. HENT: Normocephalic, atraumatic. Mouth/Throat: normal. Mucous membranes are moist.      External Ears: Normal       Neck: No visualized mass      Pulmonary/Chest:  Respiratory effort normal.  No visualized signs of difficulty breathing or respiratory distress         Musculoskeletal:   Normal gait with no signs of ataxia. Normal range of motion of neck. Neurological:         No Facial Asymmetry (Cranial nerve 7 motor function) (limited exam to video visit) . No gaze palsy              Skin:                     No significant exanthematous lesions or discoloration noted on facial skin                                        Psychiatric:          Normal Affect.  No Hallucinations           Other pertinent observable physical exam findings:       ASSESSMENT/PLAN:  1. Chills  - Push fluids - water. Monitor temperature. - URINALYSIS WITH MICROSCOPIC  - Culture, Urine  - Comprehensive Metabolic Panel; Future  - Sedimentation Rate; Future  - CBC Auto Differential; Future    2. Fatigue, unspecified type  - Push fluids - water. - URINALYSIS WITH MICROSCOPIC  - Culture, Urine  - Comprehensive Metabolic Panel; Future  - Sedimentation Rate; Future  - CBC Auto Differential; Future    3. Malignant melanoma, unspecified site ([facility] [address]  - Followed by Oncologist, who recently had the patient hold medication - Tafinlar and Mekinist  - Comprehensive Metabolic Panel; Future  - Sedimentation Rate; Future  - CBC Auto Differential; Future    4. Type 1 diabetes mellitus on insulin therapy (HCC)  - Continue Lantus and SSI and dietary/ lifestyle modifications. - Hemoglobin A1C; Future  - Microalbumin / Creatinine Urine Ratio; Future    5. Dyslipidemia  - Follow a low cholesterol diet, including cardiovascular exercise > 150 minutes/ week. - Lipid Panel; Future    F/u if no improvement 7-10d/ prn increased symptoms. An  electronic signature was used to authenticate this note. --Eros Allen MD on 10/29/2021 at 4:50 PM    Pursuant to the emergency declaration under the [facility], [facility] waiver authority and the [employer] Act, this Virtual  Visit was conducted, with patient's consent, to reduce the patient's risk of exposure to COVID-19 and provide continuity of care for an established patient. Services were provided through a video synchronous discussion virtually to substitute for in-person clinic visit.

## 2021-10-30 LAB
BACTERIA: ABNORMAL /HPF
BILIRUBIN URINE: NEGATIVE
BLOOD, URINE: NEGATIVE
CLARITY: CLEAR
COLOR: YELLOW
EPITHELIAL CELLS, UA: 5 /HPF (ref 0–5)
GLUCOSE URINE: 100 MG/DL
HYALINE CASTS: 4 /LPF (ref 0–8)
KETONES, URINE: NEGATIVE MG/DL
LEUKOCYTE ESTERASE, URINE: NEGATIVE
MICROSCOPIC EXAMINATION: YES
NITRITE, URINE: NEGATIVE
PH UA: 7 (ref 5–8)
PROTEIN UA: ABNORMAL MG/DL
RBC UA: 2 /HPF (ref 0–4)
SPECIFIC GRAVITY UA: 1.01 (ref 1–1.03)
URINE TYPE: ABNORMAL
UROBILINOGEN, URINE: 1 E.U./DL
WBC UA: 1 /HPF (ref 0–5)

## 2021-10-31 DIAGNOSIS — R79.89 ELEVATED LFTS: Primary | ICD-10-CM

## 2021-10-31 LAB — URINE CULTURE, ROUTINE: NORMAL

## 2021-11-01 ENCOUNTER — TELEPHONE (OUTPATIENT)
Dept: FAMILY MEDICINE CLINIC | Age: 56
End: 2021-11-01

## 2021-11-01 DIAGNOSIS — R79.89 ELEVATED LFTS: ICD-10-CM

## 2021-11-01 DIAGNOSIS — R74.8 ELEVATED LIVER ENZYMES: Primary | ICD-10-CM

## 2021-11-01 LAB
FERRITIN: 440.1 NG/ML (ref 15–150)
HAV IGM SER IA-ACNC: NORMAL
HBV SURFACE AB TITR SER: <3.5 MIU/ML
HEPATITIS B SURFACE ANTIGEN INTERPRETATION: NORMAL
HEPATITIS C ANTIBODY INTERPRETATION: NORMAL

## 2021-11-01 NOTE — TELEPHONE ENCOUNTER
pls order liver u/s     Pt. Informed of results. She is also in contact with her oncologist and will MyChart us what their plan is. Pt. Will decrease TYL. To 500mg since she gets fevers on the chemo medications. Pt. Is ok to do liver u/s and will call tomorrow to schedule. Spoke to [facility] at [facility] lab and will add on testing and if can not add on everything she will let us know.

## 2021-11-02 LAB — CERULOPLASMIN: 44 MG/DL (ref 16–45)

## 2021-11-03 LAB — HEPATITIS B CORE TOTAL ANTIBODY: NEGATIVE

## 2021-11-04 LAB — F-ACTIN AB IGG: 13 UNITS (ref 0–19)

## 2021-11-05 ENCOUNTER — TELEPHONE (OUTPATIENT)
Dept: FAMILY MEDICINE CLINIC | Age: 56
End: 2021-11-05

## 2021-11-05 NOTE — TELEPHONE ENCOUNTER
Not knowing recent occurrences, I will ask our clinic staff to coordinate next steps with patient and Dr. Migue Lawson.

## 2021-11-05 NOTE — TELEPHONE ENCOUNTER
Pt advised. Pt was seen by dr Stacy Ambrosio yesterday and she was told that she needed an ultrasound for the stomach but UC has already done one.  Please advise  Thank you

## 2021-11-05 NOTE — TELEPHONE ENCOUNTER
Patient saw  Gastroenterologist regarding liver function tests today . Gastroenterologist A&P is per below per Sharon and she apparently had a liver ultrasound there today. She does not need to do liver ultrasound I ordered since this was already done. It does appear a PET scan was ordered and approved likely by her oncologist.     Assessment and Plan:  55 Y female with no prior h/o liver disease, diagnosed with melanoma in March 2021 and started on oral chemotherapy in Jusne 2021 and has been having intermittent elevation of liver enzymes that have interrupted her chemo. Most ikely drug induced liver injury since Ultrasound did not show any biliary obstruction. Also no evidence of liver mets. However I would rule out other etiologies of elevated liver enzymes. Discussed with the patient that majority of drug induced liver injuries respond well to steroids or immune suppression. She is not a good candidate for prednisone due to brittle type I diabetes. Cellcept is an option but not ideal given her metastatic melanoma diagnosis. Other option is Budesonide, a synthetic steroid which acts on intestine and liver but has extensive first pass hepatic metabolism and active drug has very minimal systemic exposure resulting in very minimal steroid related side effects. Patient is agreeable to try this drug and see if it improves her liver numbers. Will start on Budesonide 9 mg daily. Repeat LFTs in 1 week (standing orders placed in Epic)    Liver follow up in 3 months.       Xavier Alvarado MD

## 2021-11-05 NOTE — TELEPHONE ENCOUNTER
APPROVED PET w/CT Skull Thigh 11/2/21 to 11/2/22 @ [facility]    Please inform pt of the APPROVAL for the PET/CT Scan. Document attached.

## 2021-11-09 ENCOUNTER — OFFICE VISIT (OUTPATIENT)
Dept: FAMILY MEDICINE CLINIC | Age: 56
End: 2021-11-09
Payer: OTHER GOVERNMENT

## 2021-11-09 ENCOUNTER — TELEPHONE (OUTPATIENT)
Dept: FAMILY MEDICINE CLINIC | Age: 56
End: 2021-11-09

## 2021-11-09 VITALS
HEART RATE: 85 BPM | RESPIRATION RATE: 12 BRPM | DIASTOLIC BLOOD PRESSURE: 78 MMHG | SYSTOLIC BLOOD PRESSURE: 124 MMHG | OXYGEN SATURATION: 98 % | BODY MASS INDEX: 42.14 KG/M2 | TEMPERATURE: 97.1 F | WEIGHT: 223 LBS

## 2021-11-09 DIAGNOSIS — E10.9 TYPE 1 DIABETES MELLITUS ON INSULIN THERAPY (HCC): Primary | ICD-10-CM

## 2021-11-09 PROCEDURE — 3052F HG A1C>EQUAL 8.0%<EQUAL 9.0%: CPT | Performed by: FAMILY MEDICINE

## 2021-11-09 PROCEDURE — 99213 OFFICE O/P EST LOW 20 MIN: CPT | Performed by: FAMILY MEDICINE

## 2021-11-09 RX ORDER — BUDESONIDE 3 MG/1
6 CAPSULE, COATED PELLETS ORAL EVERY MORNING
COMMUNITY

## 2021-11-09 RX ORDER — DICLOFENAC SODIUM 75 MG/1
75 TABLET, DELAYED RELEASE ORAL 2 TIMES DAILY
COMMUNITY

## 2021-11-09 NOTE — PATIENT INSTRUCTIONS
Increase the Lantus every evening by approx 3 units. Shift 10 pm dosing to 7 pm.  Continue tracking blood sugar. Call or send [employer] message either this Friday or early next week with blood sugar readings and how many units.

## 2021-11-09 NOTE — PROGRESS NOTES
[facility]  Progress Note  Carmina BrunoDO Geo palacio  1965    11/10/21    Chief Complaint:   Geo Gasca is a 55 y.o. female who is here for elevated blood sugars        HPI:     Off chemotherapy medications since Mid-October. Taking 31 units of lantus every 24 hours. Administers Lantus approximately 10 PM every night. Has been seeing spikes of blood sugar in the middle of the night. Insulin regular total of 5 shots ( 5 to 12 units). She otherwise denies any jaundice nor abdominal pain or nausea. Progress report from [facility] gastroenterologist reviewed with patient today:    Discussed with the patient that majority of drug induced liver injuries respond well to steroids or immune suppression. She is not a good candidate for prednisone due to brittle type I diabetes. Cellcept is an option but not ideal given her metastatic melanoma diagnosis. Other option is Budesonide, a synthetic steroid which acts on intestine and liver but has extensive first pass hepatic metabolism and active drug has very minimal systemic exposure resulting in very minimal steroid related side effects. Patient is agreeable to try this drug and see if it improves her liver numbers. Will start on Budesonide 9 mg daily. Repeat LFTs in 1 week (standing orders placed in Epic)      Sharma Gilford, MD - 11/05/2021 10:20 AM EDT    Formatting of this note might be different from the original.  - Blood work today  - Start Budesonide 9 mg daily  - Blood work in 1 week  - Avoid aspirin, Ibuprofen, Aleeve, Motrin since these medications can increase the risk of bleeding and can cause kidney damage.  - Can take Tylenol up to 2000 mg in a 24 hour period. - Complete abstinence from alcohol. ROS negative for headache, visionchanges, chest pain, shortness of breath, abdominal pain, urinary sx, bowel changes. Past medical, surgical, and social history reviewed. and allergies reviewed.     Allergies   Allergen Reactions    Lipitor [Atorvastatin] Other (See Comments)     Joint pain     Prior to Visit Medications    Medication Sig Taking? Authorizing Provider   diclofenac (VOLTAREN) 75 MG EC tablet Take 75 mg by mouth 2 times daily Yes Historical Provider, MD   budesonide (ENTOCORT EC) 3 MG extended release capsule Take 6 mg by mouth every morning Yes Historical Provider, MD   Continuous Blood Gluc Sensor (FREESTYLE GREG 2 SENSOR) MISC 1 each by Does not apply route continuous prn (blood sugar monitoring.) Yes Skinny Aguila, DO   metoprolol succinate (TOPROL XL) 25 MG extended release tablet Take 1 tablet by mouth daily Yes Garima Palmer, DO   blood glucose monitor strips Test 7-8 times a day & as needed for symptoms of irregular blood glucose. Dispense sufficient amount for indicated testing frequency plus additional to accommodate PRN testing needs. Yes Skinny Aguila, DO   insulin lispro (HUMALOG) 100 UNIT/ML injection vial USE PER SLIDING SCALE. Do not exceed 40 units per 24 hours. Yes Skinny Aguila, DO   benazepril (LOTENSIN) 20 MG tablet TAKE AS INSTRUCTED BY YOUR PRESCRIBER Yes Skinny Aguila DO   insulin glargine (LANTUS) 100 UNIT/ML injection vial INJECT 35 UNITS INTO THE SKIN NIGHTLY Yes Skinny Aguila, DO   Insulin Syringe-Needle U-100 (B-D INS SYRINGE 0.5CC/30GX1/2\") 30G X 1/2\" 0.5 ML MISC USE TO INJECT UNDER THE SKIN 6 TIMES DAILY Yes Skinny Aguila DO   rosuvastatin (CRESTOR) 20 MG tablet Take 1 tablet by mouth nightly Yes Garima Palmer, DO   Blood Glucose Monitoring Suppl (FREESTYLE FREEDOM LITE) w/Device KIT 1 kit by Does not apply route 4 times daily (before meals and nightly) Yes Skinny Aguila DO   FreeStyle Lancets MISC 1 each by Does not apply route 8 times daily Yes Skinny Aguila DO   Handicap Placard MISC by Does not apply route Dx of bilateral knee osteoarthritis. Effective Feb 25, 2019 until Feb 25, 2025.  Yes Yohannes Aguila DO   aspirin 81 MG EC tablet Take 81 mg by mouth daily.  Yes Historical Provider, MD   famotidine (PEPCID) 20 MG tablet Take 1-2 tablets by mouth 2 times daily  Maximo Schirmer, DO          Vitals:    11/09/21 1406   BP: 124/78   Pulse: 85   Resp: 12   Temp: 97.1 °F (36.2 °C)   TempSrc: Temporal   SpO2: 98%   Weight: 223 lb (101.2 kg)      Wt Readings from Last 3 Encounters:   11/09/21 223 lb (101.2 kg)   09/07/21 229 lb 3.2 oz (104 kg)   04/28/21 226 lb (102.5 kg)     BP Readings from Last 3 Encounters:   11/09/21 124/78   09/07/21 130/78   04/28/21 136/68       Patient Active Problem List   Diagnosis    History of carpal tunnel syndrome    HYPERLIPIDEMIA    Type 1 diabetes mellitus ([address] -- diagnosed as 7year-old    Lumbar disc disease    Palpitations    Arthritis, hip    Shoulder pain, bilateral    Murmur    Edema    Vertigo    BMI 40.0-44.9, adult ([address]    Trigger middle finger of left hand    Type 1 diabetes mellitus on insulin therapy ([address]    Dyslipidemia    Diastolic dysfunction without heart failure    Multiple thyroid nodules       Immunization History   Administered Date(s) Administered    GIANID-Kisha, Capri [place], Primary or Immunocompromised, PF, 100mcg/0.5mL 04/20/2021, 05/18/2021    Hepatitis A 09/26/2013    Influenza 10/31/2011, 09/26/2013    Influenza Vaccine, unspecified formulation 10/31/2011, 09/26/2013    Influenza Virus Vaccine 09/23/2009, 03/01/2016    Influenza, Quadv, IM, PF (6 mo and older Fluzone, Flulaval, Fluarix, and 3 yrs and older Afluria) 11/09/2016       Past Medical History:   Diagnosis Date    Acute upper respiratory infections of unspecified site 5/11/2010    Anxiety state, unspecified 5/11/2010    Arthritis     Carpal tunnel syndrome 5/11/2010    Ingrowing nail 5/11/2010    Multiple thyroid nodules 1/23/2019    Other and unspecified hyperlipidemia 5/11/2010    Pain in joint, pelvic region and thigh 5/11/2010    PONV (postoperative nausea and vomiting)     Screening 2010    Type I (juvenile type) diabetes mellitus without mention of complication, not stated as uncontrolled 2010    Unspecified arthropathy, ankle and foot 2010    Unspecified essential hypertension 2010    Urinary tract infection, site not specified 2010    Wrist fracture     left     Past Surgical History:   Procedure Laterality Date    CARPAL TUNNEL RELEASE Bilateral      SECTION      FINGER TRIGGER RELEASE      3 of them    KNEE SURGERY      SHOULDER ARTHROSCOPY Right 2018    RIGHT SHOULDER ARTHROSCOPY, DEBRIDEMENT, DECOMPRESSION WITH    TUBAL LIGATION      WRIST SURGERY  3/08     wrist fracture     Family History   Problem Relation Age of Onset    Asthma Mother     Other Mother         history of blood clots    Cancer Paternal Grandmother         throat    Birth Defects Neg Hx     Depression Neg Hx     Early Death Neg Hx     High Blood Pressure Neg Hx      Social History     Socioeconomic History    Marital status:      Spouse name: Not on file    Number of children: 3    Years of education: Not on file    Highest education level: Not on file   Occupational History    Occupation: stay at home Mom   Tobacco Use    Smoking status: Never Smoker    Smokeless tobacco: Never Used   Substance and Sexual Activity    Alcohol use: No    Drug use: No    Sexual activity: Not on file   Other Topics Concern    Not on file   Social History Narrative    Not on file     Social Determinants of Health     Financial Resource Strain: Low Risk     Difficulty of Paying Living Expenses: Not hard at all   Food Insecurity: No Food Insecurity    Worried About [address] in the Last Year: Never true    Jasson of Food in the Last Year: Never true   Transportation Needs: No Transportation Needs    Lack of Transportation (Medical): No    Lack of Transportation (Non-Medical):  No   Physical Activity:     Days of Exercise per Week: Not on file    Minutes of Exercise per Session: Not on file   Stress:     Feeling of Stress : Not on file   Social Connections:     Frequency of Communication with Friends and Family: Not on file    Frequency of Social Gatherings with Friends and Family: Not on file    Attends Mu-ism Services: Not on file    Active Member of [address] [place] [address] or Organizations: Not on file    Attends Club or Organization Meetings: Not on file    Marital Status: Not on file   Intimate Partner Violence:     Fear of Current or Ex-Partner: Not on file    Emotionally Abused: Not on file    Physically Abused: Not on file    Sexually Abused: Not on file   Housing Stability:     Unable to Pay for Housing in the Last Year: Not on file    Number of [place] in the Last Year: Not on file    Unstable Housing in the Last Year: Not on file       O: /78   Pulse 85   Temp 97.1 °F (36.2 °C) (Temporal)   Resp 12   Wt 223 lb (101.2 kg)   SpO2 98%   BMI 42.14 kg/m²   Physical Exam  GEN: No acute distress,cooperative, well nourished, alert. HEENT: PEERLA, EOMI , normocephalic/atraumatic, external nose appears normal.  External ear is normal.    Neck: soft, supple, no appreciable thyromegaly,mass  CV: No upper extremity edema. Resp:  Breathing comfortably. Psych:normal affect. Neuro: AOx3  Other Pertinent Physical Exam findings: n/a      ASSESSMENT   Diagnosis Orders   1. Type 1 diabetes mellitus on insulin therapy [facility])  Patric Barthel, MD, Endocrinology, [facility]     Spent time on blood sugar management. At patient's request referred to endocrinologist for longer-term blood sugar management. Patient seems agreeable to give my clinic team an update next week that her blood sugars.       PLAN          Time spent on encounter (including any number of the following: review of labs, imaging, provider notes, outside hospital records; performing examination/evaluation; counseling patient and family; ordering medications/tests; placing referrals and communication with referring physicians; coordination of care, and documentation in the EHR): 24 minutes  Established E/M: 10-19 (18623), 20-29 (15198), 30-39 (41709), 40-54 (04958)   New E/M: 15-29 (13455), 30-44 (16866), 45-59 (43462), 60-74 (85681)  Telephone E/M: 5-10 ([phone]), 11-20 (12570), 21-30 (00519)    If applicable, see additional patient information and instructions under \"Patient Instructions. \"    No follow-ups on file. Patient Instructions   Increase the Lantus every evening by approx 3 units. Shift 10 pm dosing to 7 pm.  Continue tracking blood sugar. Call or send [employer] message either this Friday or early next week with blood sugar readings and how many units. Please note a portion of this chart was generated using dragon dictation software. Although every effort was made to ensure the accuracy of this automated transcription,some errors in transcription may have occurred.

## 2021-11-09 NOTE — TELEPHONE ENCOUNTER
Please fax the referral for Dr. Estefani Rosado along with the /[employer] form for urgent 72 hour for management for hyperglycemia.

## 2021-11-10 ENCOUNTER — TELEPHONE (OUTPATIENT)
Dept: FAMILY MEDICINE CLINIC | Age: 56
End: 2021-11-10

## 2021-11-10 NOTE — TELEPHONE ENCOUNTER
Received [employer] approval for Dr. Ronald Cates MD  At [place] [address]. Office consult new or established patient: 11/4/21-5/3/22. Office/Outpatient established minimal Pr: 11/4/21 - 11/4/22. Letter attached.

## 2021-11-29 LAB — DIABETIC RETINOPATHY: POSITIVE

## 2022-01-07 RX ORDER — BENAZEPRIL HYDROCHLORIDE 20 MG/1
TABLET ORAL
Qty: 90 TABLET | Refills: 3 | Status: SHIPPED | OUTPATIENT
Start: 2022-01-07 | End: 2022-05-18 | Stop reason: SDUPTHER

## 2022-01-07 NOTE — TELEPHONE ENCOUNTER
Requested Prescriptions     Pending Prescriptions Disp Refills    benazepril (LOTENSIN) 20 MG tablet [Pharmacy Med Name: BENAZEPRIL HCL TABS 20MG] 90 tablet 3     Sig: TAKE AS INSTRUCTED BY YOUR PRESCRIBER     Last ov 11/9/21  Last lab 10/30/21

## 2022-01-12 DIAGNOSIS — E10.9 TYPE 1 DIABETES MELLITUS ON INSULIN THERAPY (HCC): ICD-10-CM

## 2022-01-13 RX ORDER — PEN NEEDLE, DIABETIC 29 G X1/2"
NEEDLE, DISPOSABLE MISCELLANEOUS
Qty: 540 EACH | Refills: 3 | Status: SHIPPED | OUTPATIENT
Start: 2022-01-13

## 2022-01-18 DIAGNOSIS — E10.9 TYPE 1 DIABETES MELLITUS ON INSULIN THERAPY (HCC): ICD-10-CM

## 2022-01-18 NOTE — TELEPHONE ENCOUNTER
Requested Prescriptions     Pending Prescriptions Disp Refills    insulin glargine (LANTUS) 100 UNIT/ML injection vial 50 mL 3     Sig: INJECT 35 UNITS INTO THE SKIN NIGHTLY       LOV 11/9/2021  NO F/U  LABS 10/30/21

## 2022-01-19 RX ORDER — INSULIN GLARGINE 100 [IU]/ML
INJECTION, SOLUTION SUBCUTANEOUS
Qty: 50 ML | Refills: 3 | Status: SHIPPED | OUTPATIENT
Start: 2022-01-19 | End: 2022-03-16 | Stop reason: SDUPTHER

## 2022-02-03 ENCOUNTER — PATIENT MESSAGE (OUTPATIENT)
Dept: FAMILY MEDICINE CLINIC | Age: 57
End: 2022-02-03

## 2022-02-03 DIAGNOSIS — Z12.31 BREAST CANCER SCREENING BY MAMMOGRAM: ICD-10-CM

## 2022-02-03 DIAGNOSIS — Z01.419 WELL WOMAN EXAM: ICD-10-CM

## 2022-02-03 DIAGNOSIS — C43.59 MELANOMA OF BACK (HCC): Primary | ICD-10-CM

## 2022-02-03 NOTE — TELEPHONE ENCOUNTER
From: Tonie Bundy  To: Dr. Juan German: 2/3/2022 2:59 PM EST  Subject: Referrals    Dr. Tobar Pulling I know you will be moving on in you journey and I am sorry to be losing you as my primary care physician. But before you leave, I need 3 referrals please ASAP. I need to get my Dermatology referral extended that would be the following information    Dermatology Specialist of [address]  [facility]  [phone]    I also need a Referral for Gynecology  For [address]  [phone]    And last for a mammogram  [address]  [phone]    Thank you very much and [facility]

## 2022-02-03 NOTE — TELEPHONE ENCOUNTER
Dr. Rosie Gross I know you will be moving on in you journey and I am sorry to be losing you as my primary care physician. Lucina Jones before you leave, I need 3 referrals please ASAP.  I need to get my Dermatology referral extended that would be the following information    Dermatology Specialist of [address]  [facility]  [phone]    I also need a Referral for Gynecology  For [address]  [phone]    And last for a mammogram  Tamela Kayser  [address]  [phone]    Thank you very much and [facility] of [facility]    Please advise  Thank you

## 2022-02-17 DIAGNOSIS — E10.9 TYPE 1 DIABETES MELLITUS ON INSULIN THERAPY (HCC): ICD-10-CM

## 2022-02-17 RX ORDER — FLASH GLUCOSE SENSOR
1 KIT MISCELLANEOUS CONTINUOUS PRN
Qty: 2 EACH | Refills: 5 | Status: SHIPPED | OUTPATIENT
Start: 2022-02-17 | End: 2022-08-31 | Stop reason: SDUPTHER

## 2022-02-17 RX ORDER — ROSUVASTATIN CALCIUM 20 MG/1
20 TABLET, COATED ORAL NIGHTLY
Qty: 90 TABLET | Refills: 3 | Status: SHIPPED | OUTPATIENT
Start: 2022-02-17

## 2022-02-17 NOTE — TELEPHONE ENCOUNTER
Dr. Ailyn Beyer     I have not seen anything on the previous referrals I need. I have waited the time in which you asked me to. I can make an appt for the referrals that's not a problem, but I need to get the dermatology referral extended ASAP if possible. I have a lot of skin issues from the oral chemo and have used up all my previous appts on my last referral.  Can you please submit for more visits on this one as soon as possible please.     Dermatology Specialist of [address] [place] [address]  [phone]     If you have any questions, please let me know you can reach me at [phone]     Thank you     Velia Oconnell      Pt is okay with getting scheduled for the other 2 referrals but she has run out of allotted visits for the original Dermatology referral and is needing more put on it so she can see them at her appt time next week.     appt made for the other 2

## 2022-02-17 NOTE — TELEPHONE ENCOUNTER
Requested Prescriptions     Pending Prescriptions Disp Refills    rosuvastatin (CRESTOR) 20 MG tablet 90 tablet 3     Sig: Take 1 tablet by mouth nightly    Continuous Blood Gluc Sensor (FREESTYLE GREG 2 SENSOR) MISC 2 each 5     Si each by Does not apply route continuous prn (blood sugar monitoring.)     Last ov 21  Last lab 10/30/21  Next ov 22

## 2022-02-23 NOTE — TELEPHONE ENCOUNTER
I have printed out the 2 referrals and one order for mammogram.  They are with [facility] for her to assist in completing the [facility] form.

## 2022-02-24 ENCOUNTER — TELEPHONE (OUTPATIENT)
Dept: FAMILY MEDICINE CLINIC | Age: 57
End: 2022-02-24

## 2022-02-25 NOTE — TELEPHONE ENCOUNTER
Forms have been sent    Dermatology fax [phone]  [facility] fax [phone] fax [phone]\    Message sent through [facility]

## 2022-02-28 ENCOUNTER — TELEPHONE (OUTPATIENT)
Dept: FAMILY MEDICINE CLINIC | Age: 57
End: 2022-02-28

## 2022-02-28 NOTE — TELEPHONE ENCOUNTER
[employer]  Referrals:    Dermatology APPROVED 2/23/22 to 2/23/23 w/[facility] of [address] 2/23/22 to 2/23/23 w/[facility]    Please inform pt of these approvals. Document attached.

## 2022-03-06 NOTE — TELEPHONE ENCOUNTER
Can clinic staff help patient and me clarify. I thought approval I for [facility] of [address]. Isn't this a different clinic than [facility]?

## 2022-03-07 NOTE — TELEPHONE ENCOUNTER
I called to confirm that this number [phone] was the correct fax and it was. Message sent through [email or web address]  Asking that the pt call her insurance to get some names of other offices.

## 2022-03-15 PROBLEM — R79.89 ELEVATED LIVER FUNCTION TESTS: Status: ACTIVE | Noted: 2022-03-15

## 2022-03-15 PROBLEM — E66.813 CLASS 3 SEVERE OBESITY WITH BODY MASS INDEX (BMI) OF 40.0 TO 44.9 IN ADULT: Status: ACTIVE | Noted: 2022-03-15

## 2022-03-15 PROBLEM — E78.2 MIXED HYPERLIPIDEMIA: Status: ACTIVE | Noted: 2022-03-15

## 2022-03-15 PROBLEM — E66.01 CLASS 3 SEVERE OBESITY WITH BODY MASS INDEX (BMI) OF 40.0 TO 44.9 IN ADULT (HCC): Status: ACTIVE | Noted: 2022-03-15

## 2022-03-16 ENCOUNTER — OFFICE VISIT (OUTPATIENT)
Dept: ENDOCRINOLOGY | Age: 57
End: 2022-03-16
Payer: OTHER GOVERNMENT

## 2022-03-16 VITALS
HEART RATE: 84 BPM | BODY MASS INDEX: 43.05 KG/M2 | DIASTOLIC BLOOD PRESSURE: 70 MMHG | WEIGHT: 228 LBS | HEIGHT: 61 IN | RESPIRATION RATE: 14 BRPM | OXYGEN SATURATION: 98 % | SYSTOLIC BLOOD PRESSURE: 144 MMHG | TEMPERATURE: 98 F

## 2022-03-16 DIAGNOSIS — I10 PRIMARY HYPERTENSION: ICD-10-CM

## 2022-03-16 DIAGNOSIS — E78.2 MIXED HYPERLIPIDEMIA: ICD-10-CM

## 2022-03-16 DIAGNOSIS — E04.2 MULTINODULAR GOITER: ICD-10-CM

## 2022-03-16 DIAGNOSIS — E66.01 CLASS 3 SEVERE OBESITY WITH SERIOUS COMORBIDITY AND BODY MASS INDEX (BMI) OF 40.0 TO 44.9 IN ADULT, UNSPECIFIED OBESITY TYPE (HCC): ICD-10-CM

## 2022-03-16 PROCEDURE — 99204 OFFICE O/P NEW MOD 45 MIN: CPT | Performed by: INTERNAL MEDICINE

## 2022-03-16 PROCEDURE — 95251 CONT GLUC MNTR ANALYSIS I&R: CPT | Performed by: INTERNAL MEDICINE

## 2022-03-16 RX ORDER — GLUCAGON 3 MG/1
POWDER NASAL
Qty: 2 EACH | Refills: 2 | Status: SHIPPED | OUTPATIENT
Start: 2022-03-16 | End: 2022-05-18

## 2022-03-16 RX ORDER — ACETAMINOPHEN 500 MG
500 TABLET ORAL EVERY 6 HOURS PRN
COMMUNITY

## 2022-03-16 RX ORDER — INSULIN GLARGINE 100 [IU]/ML
INJECTION, SOLUTION SUBCUTANEOUS
Qty: 60 ML | Refills: 1 | Status: SHIPPED | OUTPATIENT
Start: 2022-03-16 | End: 2022-05-18

## 2022-03-16 NOTE — PROGRESS NOTES
Soco Lei is a 65 y.o. female who is being evaluated for Type 1 diabetes mellitus. Current symptoms/problems include none and hyperglycemia and are worsening. Type 1 diabetes, uncontrolled, with neuropathy (HCC) [E10.40, E10.65]    Diagnosed with Type 1 diabetes mellitus in 1974. Comorbid conditions: hyperlipidemia, HTN, Neuropathy and Nephropathy    Current diabetic medications include: Lantus 42 units at night, Humalog 5-15 units daily    Intolerance to diabetes medications: No  Had melanoma, needs steroids at times    Weight trend: stable  Prior visit with dietician: yes  Current diet: on average, 3 meals per day  Current exercise: has back and hip pain, walks      Current monitoring regimen: home blood tests - 4 times daily  Has brought blood glucose log/meter:  Yes  Home blood sugar records: fasting range: 180-220 and postprandial range: 180-250   Any episodes of hypoglycemia? Yes  Hypoglycemia frequency and time(s):  rare  Does patient have Glucagon emergency kit? No  Does patient have rapid acting carbohydrate? Yes  Does patient wear a medic alert bracelet or necklace? No    2. 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 Mixed hyperlipidemia  No muscle pain    4. Class 3 severe obesity with serious comorbidity and body mass index (BMI) of 40.0 to 44.9 in adult, unspecified obesity type ([address]  Eats healthy    5. Multinodular goiter  No difficulty swallowing, voice change  No family history of thyroid cancer  No radiation the neck  Right 1.4, 1.2, 1.3 cm nodules    6. Hypertension  No headaches    Narrative   Thyroid ultrasound       HISTORY: Thyroid nodule       No prior studies for review       Right thyroid lobe 2 x 2 0.1 x 6.2 cm       Left lobe 2 x 1.8 x 5.4 cm       Thyroid isthmus 6 mm       3 discrete nodules are present within the right thyroid lobe.  At the superior pole, hypoechoic nodule measures 1.2 x 1.0 x 0.7 cm. At the midpole, isoechoic nodule measures 1.4 x 1 x 0.9 cm with thin hypoechoic rim or halo. An adjacent nodule at the    midpole is minimally hypoechoic measuring 1.3 x 1.2 x 0.7 cm       Parenchymal echotexture of the left lobe and isthmus is uniform.       Located immediately cephalad to the right thyroid lobe is a lymph node measuring 0.6 x 2.0 cm with central fatty hilum. This presumably represents the palpable abnormality in question.           Impression       Multinodular thyroid gland with 3 discrete lesion seen within the right lobe       Morphologically normal-appearing lymph node located cephalad to the right thyroid lobe, with normal cortical thickness and normal central fatty hilum.          Past Medical History:   Diagnosis Date    Acute upper respiratory infections of unspecified site 5/11/2010    Anxiety state, unspecified 5/11/2010    Arthritis     Carpal tunnel syndrome 5/11/2010    Ingrowing nail 5/11/2010    Multiple thyroid nodules 1/23/2019    Other and unspecified hyperlipidemia 5/11/2010    Pain in joint, pelvic region and thigh 5/11/2010    PONV (postoperative nausea and vomiting)     Screening 5/13/2010    Type I (juvenile type) diabetes mellitus without mention of complication, not stated as uncontrolled 5/11/2010    Unspecified arthropathy, ankle and foot 5/11/2010    Unspecified essential hypertension 5/11/2010    Urinary tract infection, site not specified 5/11/2010    Wrist fracture     left      Patient Active Problem List   Diagnosis    History of carpal tunnel syndrome    HYPERLIPIDEMIA    Type 1 diabetes mellitus ([address] -- diagnosed as 7year-old    Lumbar disc disease    Palpitations    Arthritis, hip    Shoulder pain, bilateral    Murmur    Edema    Vertigo    BMI 40.0-44.9, adult ([address]    Trigger middle finger of left hand    Type 1 diabetes mellitus on insulin therapy ([address]    Dyslipidemia    Diastolic dysfunction without heart failure    Multiple thyroid nodules    Type 1 diabetes, uncontrolled, with neuropathy (HCC)    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, with long-term current use of insulin (HCC)    Mixed hyperlipidemia    Class 3 severe obesity with body mass index (BMI) of 40.0 to 44.9 in adult (HCC)    Elevated liver function tests    Multinodular goiter     Past Surgical History:   Procedure Laterality Date    CARPAL TUNNEL RELEASE Bilateral      SECTION      FINGER TRIGGER RELEASE      3 of them    KNEE SURGERY      SHOULDER ARTHROSCOPY Right 2018    RIGHT SHOULDER ARTHROSCOPY, DEBRIDEMENT, DECOMPRESSION WITH    TUBAL LIGATION      WRIST SURGERY  3/08     wrist fracture     Social History     Socioeconomic History    Marital status:      Spouse name: Not on file    Number of children: 3    Years of education: Not on file    Highest education level: Not on file   Occupational History    Occupation: stay at home Mom   Tobacco Use    Smoking status: Never Smoker    Smokeless tobacco: Never Used   Substance and Sexual Activity    Alcohol use: No    Drug use: No    Sexual activity: Not on file   Other Topics Concern    Not on file   Social History Narrative    Not on file     Social Determinants of Health     Financial Resource Strain: Low Risk     Difficulty of Paying Living Expenses: Not hard at all   Food Insecurity: No Food Insecurity    Worried About Running Out of Food in the Last Year: Never true    Jasson of Food in the Last Year: Never true   Transportation Needs: No Transportation Needs    Lack of Transportation (Medical): No    Lack of Transportation (Non-Medical):  No   Physical Activity:     Days of Exercise per Week: Not on file    Minutes of Exercise per Session: Not on file   Stress:     Feeling of Stress : Not on file   Social Connections:     Frequency of Communication with Friends and Family: Not on file    Frequency of Social Gatherings with Friends and Family: Not on file    Attends Yazdanism Services: Not on file    Active Member of Clubs or Organizations: Not on file    Attends Club or Organization Meetings: Not on file    Marital Status: Not on file   Intimate Partner Violence:     Fear of Current or Ex-Partner: Not on file    Emotionally Abused: Not on file    Physically Abused: Not on file    Sexually Abused: Not on file   Housing Stability:     Unable to Pay for Housing in the Last Year: Not on file    Number of [place] in the Last Year: Not on file    Unstable Housing in the Last Year: Not on file     Family History   Problem Relation Age of Onset    Asthma Mother    Moraes Other Mother         history of blood clots    Cancer Paternal Grandmother         throat    Birth Defects Neg Hx     Depression Neg Hx     Early Death Neg Hx     High Blood Pressure Neg Hx      Current Outpatient Medications   Medication Sig Dispense Refill    TURMERIC PO Take by mouth 1 tab po qd      acetaminophen (TYLENOL) 500 MG tablet Take 500 mg by mouth every 6 hours as needed for Pain      insulin glargine (LANTUS) 100 UNIT/ML injection vial INJECT 45 UNITS INTO THE SKIN NIGHTLY 60 mL 1    insulin lispro (HUMALOG) 100 UNIT/ML injection vial 6-10 units before meals plus correction up to 9 units qac and qhs, total daily dose 40 units daily 50 mL 3    BAQSIMI TWO PACK 3 MG/DOSE POWD Spray for low blood glucose emergency, may repeat in 5-20 minutes 2 each 2    rosuvastatin (CRESTOR) 20 MG tablet Take 1 tablet by mouth nightly 90 tablet 3    Continuous Blood Gluc Sensor (FREESTYLE GREG 2 SENSOR) MISC 1 each by Does not apply route continuous prn (blood sugar monitoring.) 2 each 5    Insulin Syringe-Needle U-100 (BD INSULIN SYRINGE U/F) 30G X 1/2\" 0.5 ML MISC USE TO INJECT UNDER THE SKIN SIX TIMES DAILY 540 each 3    benazepril (LOTENSIN) 20 MG tablet TAKE AS INSTRUCTED BY YOUR PRESCRIBER 90 tablet 3    metoprolol succinate (TOPROL XL) 25 MG extended release tablet Take 1 tablet by mouth daily 90 tablet 3    blood glucose monitor strips Test 7-8 times a day & as needed for symptoms of irregular blood glucose. Dispense sufficient amount for indicated testing frequency plus additional to accommodate PRN testing needs. 800 strip 3    famotidine (PEPCID) 20 MG tablet Take 1-2 tablets by mouth 2 times daily 60 tablet 5    Blood Glucose Monitoring Suppl (FREESTYLE FREEDOM LITE) w/Device KIT 1 kit by Does not apply route 4 times daily (before meals and nightly) 1 kit 0    FreeStyle Lancets MISC 1 each by Does not apply route 8 times daily 300 each 5    Handicap Placard MISC by Does not apply route Dx of bilateral knee osteoarthritis. Effective 2019 until 2025. 1 each 0    aspirin 81 MG EC tablet Take 81 mg by mouth daily.  diclofenac (VOLTAREN) 75 MG EC tablet Take 75 mg by mouth 2 times daily (Patient not taking: Reported on 3/16/2022)      budesonide (ENTOCORT EC) 3 MG extended release capsule Take 6 mg by mouth every morning (Patient not taking: Reported on 3/16/2022)       No current facility-administered medications for this visit.      Allergies   Allergen Reactions    Lipitor [Atorvastatin] Other (See Comments)     Joint pain     Family Status   Relation Name Status    Mother      Father  Alive    Sister  Alive    Brother          drug overdose    Sister  Alive    PGM  (Not Specified)    Neg Hx  (Not Specified)       Lab Review:    Lab Results   Component Value Date    WBC 4.1 10/30/2021    HGB 12.7 10/30/2021    HCT 39.5 10/30/2021    MCV 92.0 10/30/2021     10/30/2021     Lab Results   Component Value Date     10/30/2021    K 4.7 10/30/2021    CL 99 10/30/2021    CO2 27 10/30/2021    BUN 4 10/30/2021    CREATININE 0.7 10/30/2021    GLUCOSE 185 10/30/2021    CALCIUM 9.4 10/30/2021    PROT 6.5 10/30/2021    PROT 7.0 2012    LABALBU 3.7 10/30/2021    BILITOT 0.4 10/30/2021    ALKPHOS 480 10/30/2021    AST 79 10/30/2021     10/30/2021    LABGLOM >60 10/30/2021    GFRAA >60 10/30/2021    GFRAA >60 05/17/2013    AGRATIO 1.3 10/30/2021    GLOB 2.2 04/28/2021     Lab Results   Component Value Date    TSH 1.70 02/04/2020     Lab Results   Component Value Date    LABA1C 8.0 10/30/2021     Lab Results   Component Value Date    .9 10/30/2021     Lab Results   Component Value Date    CHOL 167 10/30/2021     Lab Results   Component Value Date    TRIG 119 10/30/2021     Lab Results   Component Value Date    HDL 37 10/30/2021    HDL 50 04/24/2012     Lab Results   Component Value Date    LDLCALC 106 10/30/2021     Lab Results   Component Value Date    LABVLDL 24 10/30/2021     No results found for: [facility]  Lab Results   Component Value Date    LABMICR YES 10/30/2021    LABMICR 8.00 10/30/2021     Lab Results   Component Value Date    VITD25 45.0 04/24/2012        Review of Systems:  Constitutional: has fatigue, no fever, no recent weight gain, no recent weight loss, no changes in appetite  Eyes: no eye pain, no change in vision, no eye redness, no eye irritation, no double vision  Ears, nose, throat: no nasal congestion, no sore throat, no earache, no decrease in hearing, no hoarseness, no dry mouth, no sinus problems, no difficulty swallowing, no neck lumps, no dental problems, no mouth sores, no ringing in ears  Pulmonary: no shortness of breath, no wheezing, no dyspnea on exertion, no cough  Cardiovascular: no chest pain, no lower extremity edema, no orthopnea, no intermittent leg claudication, has palpitations  Gastrointestinal: no abdominal pain, no nausea, no vomiting, no diarrhea, no constipation, no dysphagia, no heartburn, no bloating  Genitourinary: no dysuria, no urinary incontinence, no urinary hesitancy, no urinary frequency, no feelings of urinary urgency, no nocturia  Musculoskeletal: no joint swelling, no joint stiffness, has joint pain, has muscle cramps, no muscle pain  Integument/Breast: has hair loss, no skin rashes, has skin lesions, no itching, no dry skin  Neurological: no numbness, no tingling, no weakness, no confusion, no headaches, has dizziness, no fainting, no tremors, no decrease in memory, no balance problems  Psychiatric: no anxiety, no depression, no insomnia  Hematologic/Lymphatic: no tendency for easy bleeding, no swollen lymph nodes, no tendency for easy bruising  Immunology: no seasonal allergies, no frequent infections, no frequent illnesses  Endocrine: no temperature intolerance    BP (!) 144/70   Pulse 84   Temp 98 °F (36.7 °C)   Resp 14   Ht 5' 1\" (1.549 m)   Wt 228 lb (103.4 kg)   SpO2 98%   BMI 43.08 kg/m²    Wt Readings from Last 3 Encounters:   03/16/22 228 lb (103.4 kg)   11/09/21 223 lb (101.2 kg)   09/07/21 229 lb 3.2 oz (104 kg)     Body mass index is 43.08 kg/m².       OBJECTIVE:  Constitutional: no acute distress, well appearing and well nourished  Psychiatric: oriented to person, place and time, judgement and insight and normal, recent and remote memory and intact and mood and affect are normal  Skin: skin and subcutaneous tissue is normal without mass, normal turgor  Head and Face: examination of head and face revealed no abnormalities  Eyes: no lid or conjunctival swelling, erythema or discharge, pupils are normal, equal, round, reactive to light  Ears/Nose: external inspection of ears and nose revealed no abnormalities, hearing is grossly normal  Oropharynx/Mouth/Face: lips, tongue and gums are normal with no lesions, the voice quality was normal  Neck: neck is supple and symmetric, with midline trachea and no masses, thyroid is enlarged  Lymphatics: normal cervical lymph nodes, normal supraclavicular nodes  Pulmonary: no increased work of breathing or signs of respiratory distress, lungs are clear to auscultation  Cardiovascular: normal heart rate and rhythm, normal S1 and S2, no murmurs and pedal pulses and 2+ bilaterally, 1+ edema right, no left  Abdomen: abdomen is soft, non-tender with no masses  Musculoskeletal: normal gait and station and exam of the digits and nails are normal  Neurological: normal coordination and normal general cortical function    Visual inspection:  Deformity/amputation: absent  Skin lesions/pre-ulcerative calluses: present - calluses  Edema: right- 1+, left- negative    Sensory exam:  Monofilament sensation: normal  (minimum of 5 random plantar locations tested, avoiding callused areas - > 1 area with absence of sensation is + for neuropathy)    Plus at least one of the following:  Pulses: normal  Proprioception: Intact  Vibration (128 Hz): Impaired    ASSESSMENT/PLAN:  1. Type 1 diabetes, uncontrolled, with neuropathy ([facility]  Send blood glucose records for insulin adjustments  Monitor glucose with CGM  Dietitian consultation  Counseled patient on insulin pump therapy  - insulin glargine (LANTUS) 100 UNIT/ML injection vial; INJECT 45 UNITS INTO THE SKIN NIGHTLY  Dispense: 60 mL; Refill: 1  - insulin lispro (HUMALOG) 100 UNIT/ML injection vial; 6-10 units before meals plus correction up to 9 units qac and qhs, total daily dose 40 units daily  Dispense: 50 mL; Refill: 3  - HM DIABETES FOOT EXAM  - Hemoglobin A1C; Future  - Comprehensive Metabolic Panel; Future  - Lipid Panel; Future  - Microalbumin / Creatinine Urine Ratio; Future  - C-Peptide; Future  - [facility] Individual Diabetes Education (Non Care Coord Patient), [place]  - BAQSIMI TWO PACK 3 MG/DOSE POWD; Spray for low blood glucose emergency, may repeat in 5-20 minutes  Dispense: 2 each; Refill: 2    2. 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 Mixed hyperlipidemia  Continue rosuvastatin 20 mg daily  - Comprehensive Metabolic Panel; Future  - Lipid Panel; Future    4. Class 3 severe obesity with serious comorbidity and body mass index (BMI) of 40.0 to 44.9 in adult, unspecified obesity type ([address] [facility] [address]  Diet and exercise  Dietitian consultation    5. Multinodular goiter  Obtain thyroid ultrasound, then reevaluate  - US HEAD NECK SOFT TISSUE THYROID; Future  - T3, Free; Future  - T4, Free; Future  - TSH; Future  - Thyroid Peroxidase Antibody; Future  - Anti-Thyroglobulin Antibody; Future    6. Hypertension  Continue metoprolol, benazepril  CMP    Reviewed and/or ordered clinical lab results Yes  Reviewed and/or ordered radiology tests Yes  Reviewed and/or ordered other diagnostic tests No  Discussed test results with performing physician No  Independently reviewed image, tracing, or specimen No  Made a decision to obtain old records No  Reviewed and summarized old records Yes   Microalbumin creatinine ratio 115.4      AST 79  Hemoglobin A1c 8.0  Obtained history from other than patient No    Bayron Shi was counseled regarding symptoms of current diagnosis, course and complications of disease if inadequately treated, side effects of medications, diagnosis, treatment options, and prognosis, risks, benefits, complications, and alternatives of treatment, labs, imaging and other studies and treatment targets and goals. She understands instructions and counseling. These diagnosis were discussed and reviewed with the patient including the advantages of drug therapy. She was counseled at this visit on the following: diabetes complication prevention and foot care.     Total time I spent for this encounter gathering history, performing physical exam, coordinating care, counseling, and documenting in the chart 45 minutes    CGMS Download Review and Recommendations  See scanned document for blood glucose tracing documentation  [employer] personal CGMS data downloaded and reviewed. This was separate service provided/CGM interpretation    Average glucose 211± 23.7 SD  Time in range: 26%  Time above 180: 74%  Time under 70: 0%     Basal pattern review: Basal hyperglycemia  Postprandial pattern review: Postprandial hyperglycemia  Hypoglycemia review: No hypoglycemia   Activity related review: Not recorded       Based on the data, I recommend:    1. Continue adjusting long-acting insulin. Significant basal hyperglycemia    2. Continue adjusting prandial insulin    3. Dietitian consultation    4. Do timely boluses, do not underestimate carbohydrates    5. Healthy eating, portion control    Total time I spent for this encounter excluding CGM interpretation time 45 minutes      Return in about 1 month (around 4/16/2022) for diabetes.     Electronically signed by Lamont Elise MD on 3/17/2022 at 1:18 AM

## 2022-03-17 PROBLEM — I10 PRIMARY HYPERTENSION: Status: ACTIVE | Noted: 2022-03-17

## 2022-03-23 DIAGNOSIS — E04.2 MULTINODULAR GOITER: ICD-10-CM

## 2022-03-23 DIAGNOSIS — E78.2 MIXED HYPERLIPIDEMIA: ICD-10-CM

## 2022-03-23 LAB
ANTI-THYROGLOB ABS: 338 IU/ML
CREATININE URINE: 34.1 MG/DL (ref 28–259)
MICROALBUMIN UR-MCNC: 1.4 MG/DL
MICROALBUMIN/CREAT UR-RTO: 41.1 MG/G (ref 0–30)
T3 FREE: 3.3 PG/ML (ref 2.3–4.2)
T4 FREE: 1 NG/DL (ref 0.9–1.8)
THYROID PEROXIDASE (TPO) ABS: 174 IU/ML
TSH SERPL DL<=0.05 MIU/L-ACNC: 1.57 UIU/ML (ref 0.27–4.2)

## 2022-03-24 ENCOUNTER — PATIENT MESSAGE (OUTPATIENT)
Dept: ENDOCRINOLOGY | Age: 57
End: 2022-03-24

## 2022-03-24 DIAGNOSIS — E04.2 MULTINODULAR GOITER: Primary | ICD-10-CM

## 2022-03-24 DIAGNOSIS — E10.9 TYPE 1 DIABETES MELLITUS WITHOUT COMPLICATION (HCC): ICD-10-CM

## 2022-03-24 LAB
A/G RATIO: 1.8 (ref 1.1–2.2)
ALBUMIN SERPL-MCNC: 4.4 G/DL (ref 3.4–5)
ALP BLD-CCNC: 112 U/L (ref 40–129)
ALT SERPL-CCNC: 18 U/L (ref 10–40)
ANION GAP SERPL CALCULATED.3IONS-SCNC: 13 MMOL/L (ref 3–16)
AST SERPL-CCNC: 16 U/L (ref 15–37)
BILIRUB SERPL-MCNC: 0.3 MG/DL (ref 0–1)
BUN BLDV-MCNC: 11 MG/DL (ref 7–20)
CALCIUM SERPL-MCNC: 9.8 MG/DL (ref 8.3–10.6)
CHLORIDE BLD-SCNC: 102 MMOL/L (ref 99–110)
CHOLESTEROL, TOTAL: 220 MG/DL (ref 0–199)
CO2: 25 MMOL/L (ref 21–32)
CREAT SERPL-MCNC: 0.7 MG/DL (ref 0.6–1.1)
ESTIMATED AVERAGE GLUCOSE: 177.2 MG/DL
GFR AFRICAN AMERICAN: >60
GFR NON-AFRICAN AMERICAN: >60
GLUCOSE BLD-MCNC: 136 MG/DL (ref 70–99)
HBA1C MFR BLD: 7.8 %
HDLC SERPL-MCNC: 58 MG/DL (ref 40–60)
LDL CHOLESTEROL CALCULATED: 137 MG/DL
POTASSIUM SERPL-SCNC: 4.6 MMOL/L (ref 3.5–5.1)
SODIUM BLD-SCNC: 140 MMOL/L (ref 136–145)
TOTAL PROTEIN: 6.8 G/DL (ref 6.4–8.2)
TRIGL SERPL-MCNC: 127 MG/DL (ref 0–150)
VLDLC SERPL CALC-MCNC: 25 MG/DL

## 2022-03-24 PROCEDURE — 3051F HG A1C>EQUAL 7.0%<8.0%: CPT | Performed by: INTERNAL MEDICINE

## 2022-03-24 NOTE — TELEPHONE ENCOUNTER
From: Demario Elias  To: Dr. Geni Severance: 3/24/2022 10:37 AM EDT  Subject: Test results    Good Morning     I seen that a few of my thyroid test results were on the high side and I am wondering if this is a concern or something we need to follow up on before my next appointment. You can contact me at [phone] or by my chart.     Thank you for your time    Percy Lee

## 2022-03-29 LAB — C-PEPTIDE: <0.1 NG/ML (ref 1.1–4.4)

## 2022-05-03 ENCOUNTER — HOSPITAL ENCOUNTER (OUTPATIENT)
Dept: ULTRASOUND IMAGING | Age: 57
Discharge: HOME | End: 2022-05-03
Payer: OTHER GOVERNMENT

## 2022-05-03 DIAGNOSIS — E04.2 MULTINODULAR GOITER: ICD-10-CM

## 2022-05-03 PROCEDURE — 76536 US EXAM OF HEAD AND NECK: CPT

## 2022-05-18 ENCOUNTER — OFFICE VISIT (OUTPATIENT)
Dept: ENDOCRINOLOGY | Age: 57
End: 2022-05-18
Payer: OTHER GOVERNMENT

## 2022-05-18 VITALS
OXYGEN SATURATION: 97 % | BODY MASS INDEX: 43.23 KG/M2 | TEMPERATURE: 98 F | HEART RATE: 72 BPM | SYSTOLIC BLOOD PRESSURE: 138 MMHG | DIASTOLIC BLOOD PRESSURE: 73 MMHG | WEIGHT: 229 LBS | HEIGHT: 61 IN | RESPIRATION RATE: 14 BRPM

## 2022-05-18 DIAGNOSIS — E04.2 MULTINODULAR GOITER: ICD-10-CM

## 2022-05-18 DIAGNOSIS — R79.89 ELEVATED LIVER FUNCTION TESTS: ICD-10-CM

## 2022-05-18 DIAGNOSIS — L65.9 ALOPECIA: ICD-10-CM

## 2022-05-18 DIAGNOSIS — E66.01 CLASS 3 SEVERE OBESITY WITH SERIOUS COMORBIDITY AND BODY MASS INDEX (BMI) OF 40.0 TO 44.9 IN ADULT, UNSPECIFIED OBESITY TYPE (HCC): ICD-10-CM

## 2022-05-18 DIAGNOSIS — E78.2 MIXED HYPERLIPIDEMIA: ICD-10-CM

## 2022-05-18 DIAGNOSIS — I10 PRIMARY HYPERTENSION: ICD-10-CM

## 2022-05-18 LAB
CHOLESTEROL, TOTAL: 201 MG/DL (ref 0–199)
HDLC SERPL-MCNC: 53 MG/DL (ref 40–60)
LDL CHOLESTEROL CALCULATED: 120 MG/DL
TRIGL SERPL-MCNC: 141 MG/DL (ref 0–150)
VLDLC SERPL CALC-MCNC: 28 MG/DL

## 2022-05-18 PROCEDURE — 95251 CONT GLUC MNTR ANALYSIS I&R: CPT | Performed by: INTERNAL MEDICINE

## 2022-05-18 PROCEDURE — 99214 OFFICE O/P EST MOD 30 MIN: CPT | Performed by: INTERNAL MEDICINE

## 2022-05-18 PROCEDURE — 3051F HG A1C>EQUAL 7.0%<8.0%: CPT | Performed by: INTERNAL MEDICINE

## 2022-05-18 RX ORDER — BENAZEPRIL HYDROCHLORIDE 40 MG/1
TABLET, FILM COATED ORAL
COMMUNITY
Start: 2022-04-21 | End: 2022-05-18

## 2022-05-18 RX ORDER — INSULIN GLARGINE 100 [IU]/ML
INJECTION, SOLUTION SUBCUTANEOUS
Qty: 60 ML | Refills: 1
Start: 2022-05-18 | End: 2022-10-13 | Stop reason: SDUPTHER

## 2022-05-18 RX ORDER — GLUCAGON 3 MG/1
POWDER NASAL
Qty: 2 EACH | Refills: 2 | Status: SHIPPED | OUTPATIENT
Start: 2022-05-18

## 2022-05-18 RX ORDER — BENAZEPRIL HYDROCHLORIDE 40 MG/1
40 TABLET, FILM COATED ORAL DAILY
Qty: 30 TABLET | Refills: 3
Start: 2022-05-18

## 2022-05-18 RX ORDER — DULAGLUTIDE 0.75 MG/.5ML
0.75 INJECTION, SOLUTION SUBCUTANEOUS
COMMUNITY
Start: 2022-04-21

## 2022-05-18 RX ORDER — INSULIN GLARGINE 100 [IU]/ML
INJECTION, SOLUTION SUBCUTANEOUS
Qty: 60 ML | Refills: 1
Start: 2022-05-18 | End: 2022-05-18

## 2022-05-18 RX ORDER — DIAZEPAM 5 MG/1
TABLET ORAL
COMMUNITY
Start: 2022-04-21

## 2022-05-18 NOTE — PROGRESS NOTES
Julius Mariano is a 65 y.o. female who is being evaluated for Type 1 diabetes mellitus. Current symptoms/problems include none and hyperglycemia and are worsening. Type 1 diabetes, uncontrolled, with neuropathy (HCC) [E10.40, E10.65]    Diagnosed with Type 1 diabetes mellitus in 1974. Comorbid conditions: hyperlipidemia, HTN, Neuropathy and Nephropathy    Current diabetic medications include: Lantus 43 units at night, Humalog 5-15 units daily    Intolerance to diabetes medications: No  Had melanoma, needs steroids at times    Weight trend: stable  Prior visit with dietician: yes  Current diet: on average, 3 meals per day  Current exercise: has back and hip pain, walks      Current monitoring regimen: home blood tests - 4 times daily  Has brought blood glucose log/meter:  Yes  Home blood sugar records: fasting range: 180-220 and postprandial range: 180-250   Any episodes of hypoglycemia? Yes  Hypoglycemia frequency and time(s):  rare  Does patient have Glucagon emergency kit? No  Does patient have rapid acting carbohydrate? Yes  Does patient wear a medic alert bracelet or necklace? No    2. 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, with long-term current use of insulin (HCC)  No urination problems    3. Mixed hyperlipidemia  No muscle pain    4. Class 3 severe obesity with serious comorbidity and body mass index (BMI) of 40.0 to 44.9 in adult, unspecified obesity type ([address]  Eats healthy    5. Multinodular goiter  No difficulty swallowing, voice change  No family history of thyroid cancer  No radiation the neck  Right 1.4, 1.2, 1.3 cm nodules    6. Hypertension  No headaches    7. Elevated liver function test  No abdominal pain, nausea, vomiting    8. Alopecia   Has severe hair loss  Started 4 weeks    9.  Hashimoto's thyroiditis  Has fatigue    5/3/2022  EXAM: US THYROID       INDICATION: Multinodular goiter       COMPARISON: 2019       FINDINGS:       RIGHT LOBE: Measures 5.4 x 1.8 x 2.9 cm       LEFT LOBE: Measures 5.8 x 1.8 x 1.8 cm       ISTHMUS: Measures 0.8 cm       NODULES:       RIGHT LOBE: 3 solid hypoechoic thyroid nodules are stable in size and appearance. The largest in the inferior right thyroid measures 1.7 x 1.7 x 1.6 cm. This is stable in size accounting for differences in measurement technique.       LEFT LOBE: No nodules       ISTHMUS: No nodules       OTHER FINDINGS: The thyroid is enlarged and multinodular and heterogeneous, similar to the prior study.           Impression       1.  Stable thyroid nodules.    2.  Unchanged enlarged and heterogeneous thyroid.                 Past Medical History:   Diagnosis Date    Acute upper respiratory infections of unspecified site 5/11/2010    Anxiety state, unspecified 5/11/2010    Arthritis     Carpal tunnel syndrome 5/11/2010    Ingrowing nail 5/11/2010    Multiple thyroid nodules 1/23/2019    Other and unspecified hyperlipidemia 5/11/2010    Pain in joint, pelvic region and thigh 5/11/2010    PONV (postoperative nausea and vomiting)     Screening 5/13/2010    Type I (juvenile type) diabetes mellitus without mention of complication, not stated as uncontrolled 5/11/2010    Unspecified arthropathy, ankle and foot 5/11/2010    Unspecified essential hypertension 5/11/2010    Urinary tract infection, site not specified 5/11/2010    Wrist fracture     left      Patient Active Problem List   Diagnosis    History of carpal tunnel syndrome    HYPERLIPIDEMIA    Type 1 diabetes mellitus ([address] -- diagnosed as 7year-old    Lumbar disc disease    Palpitations    Arthritis, hip    Shoulder pain, bilateral    Murmur    Edema    Vertigo    BMI 40.0-44.9, adult ([address]    Trigger middle finger of left hand    Type 1 diabetes mellitus on insulin therapy ([address]    Dyslipidemia    Diastolic dysfunction without heart failure    Multiple thyroid nodules    Type 1 diabetes, uncontrolled, with neuropathy ([address]    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, with long-term current use of insulin (HCC)    Mixed hyperlipidemia    Class 3 severe obesity with body mass index (BMI) of 40.0 to 44.9 in adult (HCC)    Elevated liver function tests    Multinodular goiter    Primary hypertension    Alopecia     Past Surgical History:   Procedure Laterality Date    CARPAL TUNNEL RELEASE Bilateral      SECTION      FINGER TRIGGER RELEASE      3 of them    KNEE SURGERY      SHOULDER ARTHROSCOPY Right 2018    RIGHT SHOULDER ARTHROSCOPY, DEBRIDEMENT, DECOMPRESSION WITH    TUBAL LIGATION      WRIST SURGERY  3/08     wrist fracture     Social History     Socioeconomic History    Marital status:      Spouse name: Not on file    Number of children: 3    Years of education: Not on file    Highest education level: Not on file   Occupational History    Occupation: stay at home Mom   Tobacco Use    Smoking status: Never Smoker    Smokeless tobacco: Never Used   Substance and Sexual Activity    Alcohol use: No    Drug use: No    Sexual activity: Not on file   Other Topics Concern    Not on file   Social History Narrative    Not on file     Social Determinants of Health     Financial Resource Strain:     Difficulty of Paying Living Expenses: Not on file   Food Insecurity:     Worried About Running Out of Food in the Last Year: Not on file    Jasson of Food in the Last Year: Not on file   Transportation Needs:     Lack of Transportation (Medical): Not on file    Lack of Transportation (Non-Medical):  Not on file   Physical Activity:     Days of Exercise per Week: Not on file    Minutes of Exercise per Session: Not on file   Stress:     Feeling of Stress : Not on file   Social Connections:     Frequency of Communication with Friends and Family: Not on file    Frequency of Social Gatherings with Friends and Family: Not on file    Attends [employer]ism Services: Not on file  Active Member of Clubs or Organizations: Not on file    Attends Club or Organization Meetings: Not on file    Marital Status: Not on file   Intimate Partner Violence:     Fear of Current or Ex-Partner: Not on file    Emotionally Abused: Not on file    Physically Abused: Not on file    Sexually Abused: Not on file   Housing Stability:     Unable to Pay for Housing in the Last Year: Not on file    Number of [place] in the Last Year: Not on file    Unstable Housing in the Last Year: Not on file     Family History   Problem Relation Age of Onset    Asthma Mother    [place] Other Mother         history of blood clots    Cancer Paternal Grandmother         throat    Birth Defects Neg Hx     Depression Neg Hx     Early Death Neg Hx     High Blood Pressure Neg Hx      Current Outpatient Medications   Medication Sig Dispense Refill    diazePAM (VALIUM) 5 MG tablet       benazepril (LOTENSIN) 40 MG tablet Take 1 tablet by mouth daily 30 tablet 3    insulin lispro (HUMALOG) 100 UNIT/ML injection vial 5-15 units before meals plus correction up to 9 units qac and qhs, total daily dose 40 units daily 50 mL 3    BAQSIMI TWO PACK 3 MG/DOSE POWD Spray for low blood glucose emergency, may repeat in 5-20 minutes 2 each 2    insulin glargine (LANTUS) 100 UNIT/ML injection vial INJECT 45 UNITS INTO THE SKIN NIGHTLY 60 mL 1    TURMERIC PO Take by mouth 1 tab po qd      acetaminophen (TYLENOL) 500 MG tablet Take 500 mg by mouth every 6 hours as needed for Pain      rosuvastatin (CRESTOR) 20 MG tablet Take 1 tablet by mouth nightly 90 tablet 3    Continuous Blood Gluc Sensor (FREESTYLE GREG 2 SENSOR) MISC 1 each by Does not apply route continuous prn (blood sugar monitoring.) 2 each 5    Insulin Syringe-Needle U-100 (BD INSULIN SYRINGE U/F) 30G X 1/2\" 0.5 ML MISC USE TO INJECT UNDER THE SKIN SIX TIMES DAILY 540 each 3    metoprolol succinate (TOPROL XL) 25 MG extended release tablet Take 1 tablet by mouth daily 90 tablet 3    blood glucose monitor strips Test 7-8 times a day & as needed for symptoms of irregular blood glucose. Dispense sufficient amount for indicated testing frequency plus additional to accommodate PRN testing needs. 800 strip 3    famotidine (PEPCID) 20 MG tablet Take 1-2 tablets by mouth 2 times daily 60 tablet 5    Blood Glucose Monitoring Suppl (FREESTYLE FREEDOM LITE) w/Device KIT 1 kit by Does not apply route 4 times daily (before meals and nightly) 1 kit 0    FreeStyle Lancets MISC 1 each by Does not apply route 8 times daily 300 each 5    Handicap Placard MISC by Does not apply route Dx of bilateral knee osteoarthritis. Effective 2019 until 2025. 1 each 0    aspirin 81 MG EC tablet Take 81 mg by mouth daily.  Dulaglutide (TRULICITY) 4.16 IK/2.2RX SOPN Inject 0.75 mg into the skin every 7 days (Patient not taking: Reported on 2022)      diclofenac (VOLTAREN) 75 MG EC tablet Take 75 mg by mouth 2 times daily (Patient not taking: Reported on 3/16/2022)      budesonide (ENTOCORT EC) 3 MG extended release capsule Take 6 mg by mouth every morning (Patient not taking: Reported on 3/16/2022)       No current facility-administered medications for this visit.      Allergies   Allergen Reactions    Lipitor [Atorvastatin] Other (See Comments)     Joint pain     Family Status   Relation Name Status    Mother      Father  Alive    Sister  Alive    Brother          drug overdose    Sister  Alive    PGM  (Not Specified)    Neg Hx  (Not Specified)       Lab Review:    Lab Results   Component Value Date    WBC 4.1 10/30/2021    HGB 12.7 10/30/2021    HCT 39.5 10/30/2021    MCV 92.0 10/30/2021     10/30/2021     Lab Results   Component Value Date     2022    K 4.6 2022     2022    CO2 25 2022    BUN 11 2022    CREATININE 0.7 2022    GLUCOSE 136 2022    CALCIUM 9.8 2022    PROT 6.8 03/23/2022    PROT 7.0 09/27/2012    LABALBU 4.4 03/23/2022    BILITOT 0.3 03/23/2022    ALKPHOS 112 03/23/2022    AST 16 03/23/2022    ALT 18 03/23/2022    LABGLOM >60 03/23/2022    GFRAA >60 03/23/2022    GFRAA >60 05/17/2013    AGRATIO 1.8 03/23/2022    GLOB 2.2 04/28/2021     Lab Results   Component Value Date    TSH 1.57 03/23/2022    FT3 3.3 03/23/2022     Lab Results   Component Value Date    LABA1C 7.8 03/23/2022     Lab Results   Component Value Date    .2 03/23/2022     Lab Results   Component Value Date    CHOL 220 03/23/2022     Lab Results   Component Value Date    TRIG 127 03/23/2022     Lab Results   Component Value Date    HDL 58 03/23/2022    HDL 50 04/24/2012     Lab Results   Component Value Date    LDLCALC 137 03/23/2022     Lab Results   Component Value Date    LABVLDL 25 03/23/2022     No results found for: [facility]  Lab Results   Component Value Date    LABMICR 1.40 03/23/2022    LABMICR YES 10/30/2021     Lab Results   Component Value Date    VITD25 45.0 04/24/2012        Review of Systems:  Constitutional: has fatigue, no fever, no recent weight gain, no recent weight loss, no changes in appetite  Eyes: no eye pain, no change in vision, no eye redness, no eye irritation, no double vision  Ears, nose, throat: no nasal congestion, no sore throat, no earache, no decrease in hearing, no hoarseness, no dry mouth, no sinus problems, no difficulty swallowing, no neck lumps, no dental problems, no mouth sores, no ringing in ears  Pulmonary: no shortness of breath, no wheezing, no dyspnea on exertion, no cough  Cardiovascular: no chest pain, no lower extremity edema, no orthopnea, no intermittent leg claudication, has palpitations  Gastrointestinal: no abdominal pain, no nausea, no vomiting, no diarrhea, no constipation, no dysphagia, no heartburn, no bloating  Genitourinary: no dysuria, no urinary incontinence, no urinary hesitancy, no urinary frequency, no feelings of urinary urgency, no nocturia  Musculoskeletal: no joint swelling, no joint stiffness, has joint pain, has muscle cramps, no muscle pain  Integument/Breast: has hair loss, no skin rashes, has skin lesions, no itching, no dry skin  Neurological: no numbness, no tingling, no weakness, no confusion, no headaches, has dizziness, no fainting, no tremors, no decrease in memory, no balance problems  Psychiatric: no anxiety, no depression, no insomnia  Hematologic/Lymphatic: no tendency for easy bleeding, no swollen lymph nodes, no tendency for easy bruising  Immunology: no seasonal allergies, no frequent infections, no frequent illnesses  Endocrine: no temperature intolerance    /73   Pulse 72   Temp 98 °F (36.7 °C)   Resp 14   Ht 5' 1\" (1.549 m)   Wt 229 lb (103.9 kg)   SpO2 97%   BMI 43.27 kg/m²    Wt Readings from Last 3 Encounters:   05/18/22 229 lb (103.9 kg)   03/16/22 228 lb (103.4 kg)   11/09/21 223 lb (101.2 kg)     Body mass index is 43.27 kg/m².       OBJECTIVE:  Constitutional: no acute distress, well appearing and well nourished  Psychiatric: oriented to person, place and time, judgement and insight and normal, recent and remote memory and intact and mood and affect are normal  Skin: skin and subcutaneous tissue is normal without mass, normal turgor  Head and Face: examination of head and face revealed no abnormalities  Eyes: no lid or conjunctival swelling, erythema or discharge, pupils are normal, equal, round, reactive to light  Ears/Nose: external inspection of ears and nose revealed no abnormalities, hearing is grossly normal  Oropharynx/Mouth/Face: lips, tongue and gums are normal with no lesions, the voice quality was normal  Neck: neck is supple and symmetric, with midline trachea and no masses, thyroid is enlarged  Lymphatics: normal cervical lymph nodes, normal supraclavicular nodes  Pulmonary: no increased work of breathing or signs of respiratory distress, lungs are clear to auscultation  Cardiovascular: normal heart rate and rhythm, normal S1 and S2, no murmurs and pedal pulses and 2+ bilaterally, 1+ edema right, no left  Abdomen: abdomen is soft, non-tender with no masses  Musculoskeletal: normal gait and station and exam of the digits and nails are normal  Neurological: normal coordination and normal general cortical function    Visual inspection:  Deformity/amputation: absent  Skin lesions/pre-ulcerative calluses: present - calluses  Edema: right- 1+, left- negative    Sensory exam:  Monofilament sensation: normal  (minimum of 5 random plantar locations tested, avoiding callused areas - > 1 area with absence of sensation is + for neuropathy)    Plus at least one of the following:  Pulses: normal  Proprioception: Intact  Vibration (128 Hz): Impaired    ASSESSMENT/PLAN:  1. Type 1 diabetes, uncontrolled, with neuropathy (HCC)  C-peptide <0.1  Hemoglobin A1c 7.8  Send blood glucose records for insulin adjustments  Monitor glucose with CGM  Dietitian consultation  Counseled patient on insulin pump therapy  - insulin glargine (LANTUS) 100 UNIT/ML injection vial; INJECT 45 UNITS INTO THE SKIN NIGHTLY  Dispense: 60 mL; Refill: 1  - insulin lispro (HUMALOG) 100 UNIT/ML injection vial; 6-10 units before meals plus correction up to 9 units qac and qhs, total daily dose 40 units daily  Dispense: 50 mL; Refill: 3  - [facility] DIABETES FOOT EXAM  - Hemoglobin A1C; Future  - Comprehensive Metabolic Panel; Future  - Lipid Panel; Future  - Microalbumin / Creatinine Urine Ratio; Future  - C-Peptide; Future  - [facility] Individual Diabetes Education (Non Care Coord Patient), [place]  - BAQSIMI TWO PACK 3 MG/DOSE POWD; Spray for low blood glucose emergency, may repeat in 5-20 minutes  Dispense: 2 each; Refill: 2    2.  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 Mixed hyperlipidemia    Uncontrolled  Take daily  Continue rosuvastatin 20 mg daily  - Comprehensive Metabolic Panel; Future  - Lipid Panel; Future    4. Class 3 severe obesity with serious comorbidity and body mass index (BMI) of 40.0 to 44.9 in adult, unspecified obesity type ([address]  Discussed [place]  Diet and exercise  Dietitian consultation    5. Multinodular goiter  TSH 1.57  5/3/2022 thyroid sonogram-3 solid nodules are stable, the largest right 1.7 cm nodule, stable  diagnosed in 2019  FNA of the right 1.7 cm nodule  - US HEAD NECK SOFT TISSUE THYROID; Future    6. Hypertension  Continue metoprolol, benazepril  -CMP    7. Elevated liver function test  -18  AST 79-16  Improved    8. Alopecia   Uses Rogaine  Obtain testosterone, DHEAS    9. Hashimoto's thyroiditis  Follow TSH, FT4  Discussed spironolactone    Reviewed and/or ordered clinical lab results Yes  Reviewed and/or ordered radiology tests Yes  Reviewed and/or ordered other diagnostic tests No  Discussed test results with performing physician No  Independently reviewed image, tracing, or specimen No  Made a decision to obtain old records No  Reviewed and summarized old records Yes   Microalbumin creatinine ratio 115.4      AST 79  Hemoglobin A1c 8.0  Obtained history from other than patient No    Selby Arabia Bleser was counseled regarding symptoms of daibetes, hair loss diagnosis, course and complications of disease if inadequately treated, side effects of medications, diagnosis, treatment options, and prognosis, risks, benefits, complications, and alternatives of treatment, labs, imaging and other studies and treatment targets and goals. She understands instructions and counseling. These diagnosis were discussed and reviewed with the patient including the advantages of drug therapy.  She was counseled at this visit on the following: diabetes complication prevention and foot care.    Total time I spent for this encounter gathering history, performing physical exam, coordinating care, counseling, and documenting in the chart -45 minutes    CGMS Download Review and Recommendations  See scanned document for blood glucose tracing documentation  [employer] personal CGMS data downloaded and reviewed. This was separate service provided/CGM interpretation    Average glucose 204± 25.9 SD  Time in range: 35%  Time above 180: 65%  Time under 70: 0%   GMI 8.2    Basal pattern review: Basal hyperglycemia  Postprandial pattern review: Postprandial hyperglycemia  Hypoglycemia review: No hypoglycemia   Activity related review: Not recorded       Based on the data, I recommend:    1. Continue adjusting long-acting insulin. Significant basal hyperglycemia    2. Continue adjusting prandial insulin    3. Dietitian consultation    4. Do timely boluses, do not underestimate carbohydrates    5. Healthy eating, portion control      Return in about 3 months (around 8/18/2022) for diabetes.     Electronically signed by Amberly Vaca MD on 5/18/2022 at 9:26 AM

## 2022-05-20 LAB
SEX HORMONE BINDING GLOBULIN: 42 NMOL/L (ref 30–135)
TESTOSTERONE FREE-NONMALE: 3.9 PG/ML (ref 0.6–3.8)
TESTOSTERONE TOTAL: 25 NG/DL (ref 20–70)

## 2022-05-21 ENCOUNTER — TELEPHONE (OUTPATIENT)
Dept: ENDOCRINOLOGY | Age: 57
End: 2022-05-21

## 2022-05-21 DIAGNOSIS — I51.89 DIASTOLIC DYSFUNCTION WITHOUT HEART FAILURE: ICD-10-CM

## 2022-05-23 LAB — DHEAS (DHEA SULFATE): 40.1 UG/DL (ref 26–200)

## 2022-05-23 RX ORDER — SPIRONOLACTONE 25 MG/1
25 TABLET ORAL 2 TIMES DAILY
Qty: 60 TABLET | Refills: 3 | Status: SHIPPED | OUTPATIENT
Start: 2022-05-23

## 2022-05-23 NOTE — TELEPHONE ENCOUNTER
[facility] in [facility] and informed pt, pt expressed understanding. Pt agreeable to low dose spironolactone.

## 2022-05-24 NOTE — TELEPHONE ENCOUNTER
I sent prescription to pharmacy 25 mg twice a day. Let me know if any problems. Ordered potassium in 2 weeks. Not fasting is okay.   Please call for results

## 2022-06-15 ENCOUNTER — HOSPITAL ENCOUNTER (OUTPATIENT)
Dept: ULTRASOUND IMAGING | Age: 57
Discharge: HOME | End: 2022-06-15
Payer: OTHER GOVERNMENT

## 2022-06-15 DIAGNOSIS — E04.2 MULTINODULAR GOITER: ICD-10-CM

## 2022-06-15 PROCEDURE — 88173 CYTOPATH EVAL FNA REPORT: CPT

## 2022-06-15 PROCEDURE — 88172 CYTP DX EVAL FNA 1ST EA SITE: CPT

## 2022-06-15 PROCEDURE — 88305 TISSUE EXAM BY PATHOLOGIST: CPT

## 2022-06-15 PROCEDURE — 10005 FNA BX W/US GDN 1ST LES: CPT

## 2022-06-16 ENCOUNTER — TELEPHONE (OUTPATIENT)
Dept: ENDOCRINOLOGY | Age: 57
End: 2022-06-16

## 2022-06-16 NOTE — TELEPHONE ENCOUNTER
Please inform patient that thyroid biopsy came as atypia. It has to be send to [facility] for genetic testing. Please inform [facility] to send the sample for [facility] testing.

## 2022-06-20 ENCOUNTER — TELEPHONE (OUTPATIENT)
Dept: ENDOCRINOLOGY | Age: 57
End: 2022-06-20

## 2022-06-20 NOTE — TELEPHONE ENCOUNTER
Pharm. Calling about lispro 100 units. Pharmacist needs clarification for the directions on lispro. Needs dosing and frequency. Please call [phone] Ref # [id number], anyone can help.

## 2022-06-21 ENCOUNTER — TELEPHONE (OUTPATIENT)
Dept: ENDOCRINOLOGY | Age: 57
End: 2022-06-21

## 2022-06-21 NOTE — TELEPHONE ENCOUNTER
Pharmacy called and needs to clarify directions for insulin lispro (HUMALOG) 100 UNIT/ML injection vial           Reference # [id number]   When you call back.

## 2022-06-22 NOTE — TELEPHONE ENCOUNTER
Clarified w/ pharmacy. 6-10 units before meals and at night per SSI, total daily dose of up to 40 units daily. Please update script to say this, I can't figure out how to just update the script. Doesn't need resent, just updated please.

## 2022-06-28 ENCOUNTER — TELEPHONE (OUTPATIENT)
Dept: ENDOCRINOLOGY | Age: 57
End: 2022-06-28

## 2022-06-28 NOTE — TELEPHONE ENCOUNTER
Patient calling for the results of her Fine Needle Aspiration biopsy. Would like a call back from the nurse.

## 2022-07-11 LAB
Lab: NORMAL
REPORT: NORMAL
THIS TEST SENT TO: NORMAL

## 2022-09-07 ENCOUNTER — OFFICE VISIT (OUTPATIENT)
Dept: ENDOCRINOLOGY | Age: 57
End: 2022-09-07
Payer: OTHER GOVERNMENT

## 2022-09-07 VITALS
RESPIRATION RATE: 14 BRPM | HEIGHT: 61 IN | SYSTOLIC BLOOD PRESSURE: 149 MMHG | TEMPERATURE: 98 F | HEART RATE: 82 BPM | BODY MASS INDEX: 43.99 KG/M2 | DIASTOLIC BLOOD PRESSURE: 71 MMHG | WEIGHT: 233 LBS | OXYGEN SATURATION: 98 %

## 2022-09-07 DIAGNOSIS — I51.89 DIASTOLIC DYSFUNCTION WITHOUT HEART FAILURE: ICD-10-CM

## 2022-09-07 DIAGNOSIS — E04.2 MULTINODULAR GOITER: ICD-10-CM

## 2022-09-07 DIAGNOSIS — L65.9 ALOPECIA: ICD-10-CM

## 2022-09-07 DIAGNOSIS — I10 PRIMARY HYPERTENSION: ICD-10-CM

## 2022-09-07 DIAGNOSIS — E78.2 MIXED HYPERLIPIDEMIA: ICD-10-CM

## 2022-09-07 DIAGNOSIS — E66.01 CLASS 3 SEVERE OBESITY WITH SERIOUS COMORBIDITY AND BODY MASS INDEX (BMI) OF 40.0 TO 44.9 IN ADULT, UNSPECIFIED OBESITY TYPE (HCC): ICD-10-CM

## 2022-09-07 DIAGNOSIS — R79.89 ELEVATED LIVER FUNCTION TESTS: ICD-10-CM

## 2022-09-07 PROCEDURE — 3051F HG A1C>EQUAL 7.0%<8.0%: CPT | Performed by: INTERNAL MEDICINE

## 2022-09-07 PROCEDURE — 99214 OFFICE O/P EST MOD 30 MIN: CPT | Performed by: INTERNAL MEDICINE

## 2022-09-07 PROCEDURE — 95251 CONT GLUC MNTR ANALYSIS I&R: CPT | Performed by: INTERNAL MEDICINE

## 2022-09-07 RX ORDER — ERGOCALCIFEROL (VITAMIN D2) 1250 MCG
50000 CAPSULE ORAL WEEKLY
COMMUNITY
Start: 2022-08-29

## 2022-09-07 RX ORDER — EZETIMIBE 10 MG/1
10 TABLET ORAL DAILY
COMMUNITY
Start: 2022-08-29

## 2022-09-07 NOTE — PROGRESS NOTES
Vikas Jones is a 65 y.o. female who is being evaluated for Type 1 diabetes mellitus. Current symptoms/problems include none and hyperglycemia  and are worsening. Type 1 diabetes, uncontrolled, with neuropathy (HCC) [E10.40, E10.65]    Diagnosed with Type 1 diabetes mellitus in 1974. Comorbid conditions:  hyperlipidemia, HTN, Neuropathy and Nephropathy    Current diabetic medications include: Lantus 43 units at night, Humalog 5-15 units daily    Intolerance to diabetes medications: No  Had melanoma, needs steroids at times    Weight trend: stable  Prior visit with dietician: yes  Current diet: on average, 3 meals per day  Current exercise: has back and hip pain, walks      Current monitoring regimen: home blood tests - 4 times daily  Has brought blood glucose log/meter:  Yes  Home blood sugar records: fasting range: 180-220 and postprandial range: 180-250   Any episodes of hypoglycemia? Yes  Hypoglycemia frequency and time(s):  rare  Does patient have Glucagon emergency kit? No  Does patient have rapid acting carbohydrate? Yes  Does patient wear a medic alert bracelet or necklace? No    2. 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, with long-term current use of insulin (HCC)  No urination problems    3. Mixed hyperlipidemia  No muscle pain    4. Class 3 severe obesity with serious comorbidity and body mass index (BMI) of 40.0 to 44.9 in adult, unspecified obesity type ([address]  Eats healthy    5. Multinodular goiter  No difficulty swallowing, voice change  No family history of thyroid cancer  No radiation the neck  Right 1.4, 1.2, 1.3 cm nodules    6. Hypertension  No headaches    7. Elevated liver function test  No abdominal pain, nausea, vomiting    8. Alopecia   Has severe hair loss  Started 4 weeks    9.  Hashimoto's thyroiditis  Has fatigue    5/3/2022  EXAM: US THYROID       INDICATION: Multinodular goiter       COMPARISON: 2019       FINDINGS:       RIGHT LOBE: Measures 5.4 x 1.8 x 2.9 cm       LEFT LOBE: Measures 5.8 x 1.8 x 1.8 cm       ISTHMUS: Measures 0.8 cm       NODULES:       RIGHT LOBE: 3 solid hypoechoic thyroid nodules are stable in size and appearance. The largest in the inferior right thyroid measures 1.7 x 1.7 x 1.6 cm. This is stable in size accounting for differences in measurement technique. LEFT LOBE: No nodules       ISTHMUS: No nodules       OTHER FINDINGS: The thyroid is enlarged and multinodular and heterogeneous, similar to the prior study. Impression       1. Stable thyroid nodules. 2.  Unchanged enlarged and heterogeneous thyroid.                  Past Medical History:   Diagnosis Date    Acute upper respiratory infections of unspecified site 5/11/2010    Anxiety state, unspecified 5/11/2010    Arthritis     Carpal tunnel syndrome 5/11/2010    Ingrowing nail 5/11/2010    Multiple thyroid nodules 1/23/2019    Other and unspecified hyperlipidemia 5/11/2010    Pain in joint, pelvic region and thigh 5/11/2010    PONV (postoperative nausea and vomiting)     Screening 5/13/2010    Type I (juvenile type) diabetes mellitus without mention of complication, not stated as uncontrolled 5/11/2010    Unspecified arthropathy, ankle and foot 5/11/2010    Unspecified essential hypertension 5/11/2010    Urinary tract infection, site not specified 5/11/2010    Wrist fracture     left      Patient Active Problem List   Diagnosis    History of carpal tunnel syndrome    HYPERLIPIDEMIA    Type 1 diabetes mellitus ([address] -- diagnosed as 7year-old    Lumbar disc disease    Palpitations    Arthritis, hip    Shoulder pain, bilateral    Murmur    Edema    Vertigo    BMI 40.0-44.9, adult (HCC)    Trigger middle finger of left hand    Type 1 diabetes mellitus on insulin therapy (HCC)    Dyslipidemia    Diastolic dysfunction without heart failure    Multiple thyroid nodules    Type 1 diabetes, uncontrolled, with neuropathy ([address]    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, with long-term current use of insulin (HCC)    Mixed hyperlipidemia    Class 3 severe obesity with body mass index (BMI) of 40.0 to 44.9 in adult (HCC)    Elevated liver function tests    Multinodular goiter    Primary hypertension    Alopecia     Past Surgical History:   Procedure Laterality Date    CARPAL TUNNEL RELEASE Bilateral      SECTION      FINGER TRIGGER RELEASE      3 of them    KNEE SURGERY      SHOULDER ARTHROSCOPY Right 2018    RIGHT SHOULDER ARTHROSCOPY, DEBRIDEMENT, DECOMPRESSION WITH    TUBAL LIGATION      WRIST SURGERY  3/08     wrist fracture     Social History     Socioeconomic History    Marital status:      Spouse name: Not on file    Number of children: 3    Years of education: Not on file    Highest education level: Not on file   Occupational History    Occupation: stay at home Mom   Tobacco Use    Smoking status: Never    Smokeless tobacco: Never   Substance and Sexual Activity    Alcohol use: No    Drug use: No    Sexual activity: Not on file   Other Topics Concern    Not on file   Social History Narrative    Not on file     Social Determinants of Health     Financial Resource Strain: Not on file   Food Insecurity: Not on file   Transportation Needs: Not on file   Physical Activity: Not on file   Stress: Not on file   Social Connections: Not on file   Intimate Partner Violence: Not on file   Housing Stability: Not on file     Family History   Problem Relation Age of Onset    Asthma Mother     Other Mother         history of blood clots    Cancer Paternal Grandmother         throat    Birth Defects Neg Hx     Depression Neg Hx     Early Death Neg Hx     High Blood Pressure Neg Hx      Current Outpatient Medications   Medication Sig Dispense Refill    ergocalciferol (ERGOCALCIFEROL) 1.25 MG (56847 UT) capsule Take 50,000 Units by mouth once a week      ezetimibe (ZETIA) 10 MG tablet Take 10 mg by mouth daily      Continuous Blood Gluc Sensor (FREESTYLE GREG 2 SENSOR) MISC Change sensor q 14 days; use to monitor BG daily 4 each 1    insulin lispro (HUMALOG) 100 UNIT/ML injection vial 6-10 units before meals plus correction doses, total daily dose 40 units daily 50 mL 0    spironolactone (ALDACTONE) 25 MG tablet Take 1 tablet by mouth 2 times daily 60 tablet 3    diazePAM (VALIUM) 5 MG tablet       benazepril (LOTENSIN) 40 MG tablet Take 1 tablet by mouth daily 30 tablet 3    BAQSIMI TWO PACK 3 MG/DOSE POWD Spray for low blood glucose emergency, may repeat in 5-20 minutes 2 each 2    insulin glargine (LANTUS) 100 UNIT/ML injection vial INJECT 45 UNITS INTO THE SKIN NIGHTLY 60 mL 1    TURMERIC PO Take by mouth 1 tab po qd      acetaminophen (TYLENOL) 500 MG tablet Take 500 mg by mouth every 6 hours as needed for Pain      rosuvastatin (CRESTOR) 20 MG tablet Take 1 tablet by mouth nightly 90 tablet 3    Insulin Syringe-Needle U-100 (BD INSULIN SYRINGE U/F) 30G X 1/2\" 0.5 ML MISC USE TO INJECT UNDER THE SKIN SIX TIMES DAILY 540 each 3    metoprolol succinate (TOPROL XL) 25 MG extended release tablet Take 1 tablet by mouth daily (Patient taking differently: Take 50 mg by mouth daily) 90 tablet 3    blood glucose monitor strips Test 7-8 times a day & as needed for symptoms of irregular blood glucose. Dispense sufficient amount for indicated testing frequency plus additional to accommodate PRN testing needs. 800 strip 3    famotidine (PEPCID) 20 MG tablet Take 1-2 tablets by mouth 2 times daily 60 tablet 5    Blood Glucose Monitoring Suppl (FREESTYLE FREEDOM LITE) w/Device KIT 1 kit by Does not apply route 4 times daily (before meals and nightly) 1 kit 0    FreeStyle Lancets MISC 1 each by Does not apply route 8 times daily 300 each 5    Handicap Placard MISC by Does not apply route Dx of bilateral knee osteoarthritis. Effective Feb 25, 2019 until Feb 25, 2025. 1 each 0    aspirin 81 MG EC tablet Take 81 mg by mouth daily.       Dulaglutide (TRULICITY) 0.75 MG/0.5ML SOPN Inject 0.75 mg into the skin every 7 days (Patient not taking: No sig reported)      diclofenac (VOLTAREN) 75 MG EC tablet Take 75 mg by mouth 2 times daily (Patient not taking: No sig reported)      budesonide (ENTOCORT EC) 3 MG extended release capsule Take 6 mg by mouth every morning (Patient not taking: No sig reported)       No current facility-administered medications for this visit.      Allergies   Allergen Reactions    Lipitor [Atorvastatin] Other (See Comments)     Joint pain     Family Status   Relation Name Status    Mother      Father  Alive    Sister  Alive    Brother          drug overdose    Sister  Alive    PGM  (Not Specified)    Neg Hx  (Not Specified)       Lab Review:    Lab Results   Component Value Date/Time    WBC 4.1 10/30/2021 09:29 AM    HGB 12.7 10/30/2021 09:29 AM    HCT 39.5 10/30/2021 09:29 AM    MCV 92.0 10/30/2021 09:29 AM     10/30/2021 09:29 AM     Lab Results   Component Value Date/Time     2022 11:08 AM    K 4.6 2022 11:08 AM     2022 11:08 AM    CO2 25 2022 11:08 AM    BUN 11 2022 11:08 AM    CREATININE 0.7 2022 11:08 AM    GLUCOSE 136 2022 11:08 AM    CALCIUM 9.8 2022 11:08 AM    PROT 6.8 2022 11:08 AM    PROT 7.0 2012 11:04 AM    LABALBU 4.4 2022 11:08 AM    BILITOT 0.3 2022 11:08 AM    ALKPHOS 112 2022 11:08 AM    AST 16 2022 11:08 AM    ALT 18 2022 11:08 AM    LABGLOM >60 2022 11:08 AM    GFRAA >60 2022 11:08 AM    GFRAA >60 2013 09:38 AM    AGRATIO 1.8 2022 11:08 AM    GLOB 2.2 2021 12:25 PM     Lab Results   Component Value Date/Time    TSH 1.57 2022 11:08 AM    FT3 3.3 2022 11:08 AM     Lab Results   Component Value Date/Time    LABA1C 7.8 2022 11:08 AM     Lab Results   Component Value Date/Time    .2 2022 11:08 AM     Lab Results   Component Value Date/Time    CHOL 201 05/18/2022 10:04 AM     Lab Results   Component Value Date/Time    TRIG 141 05/18/2022 10:04 AM     Lab Results   Component Value Date/Time    HDL 53 05/18/2022 10:04 AM    HDL 50 04/24/2012 10:05 AM     Lab Results   Component Value Date/Time    LDLCALC 120 05/18/2022 10:04 AM     Lab Results   Component Value Date/Time    LABVLDL 28 05/18/2022 10:04 AM     No results found for: CHOLHDLRATIO  Lab Results   Component Value Date/Time    LABMICR 1.40 03/23/2022 11:09 AM    LABMICR YES 10/30/2021 09:47 AM     Lab Results   Component Value Date/Time    VITD25 45.0 04/24/2012 10:05 AM        Review of Systems:  Constitutional: has fatigue, no fever, no recent weight gain, no recent weight loss, no changes in appetite  Eyes: no eye pain, no change in vision, no eye redness, no eye irritation, no double vision  Ears, nose, throat: no nasal congestion, no sore throat, no earache, no decrease in hearing, no hoarseness, no dry mouth, no sinus problems, no difficulty swallowing, no neck lumps, no dental problems, no mouth sores, no ringing in ears  Pulmonary: no shortness of breath, no wheezing, no dyspnea on exertion, no cough  Cardiovascular: no chest pain, no lower extremity edema, no orthopnea, no intermittent leg claudication, has palpitations  Gastrointestinal: no abdominal pain, no nausea, no vomiting, no diarrhea, no constipation, no dysphagia, no heartburn, no bloating  Genitourinary: no dysuria, no urinary incontinence, no urinary hesitancy, no urinary frequency, no feelings of urinary urgency, no nocturia  Musculoskeletal: no joint swelling, no joint stiffness, has joint pain, has muscle cramps, no muscle pain  Integument/Breast: has hair loss, no skin rashes, has skin lesions, no itching, no dry skin  Neurological: no numbness, no tingling, no weakness, no confusion, no headaches, has dizziness, no fainting, no tremors, no decrease in memory, no balance problems  Psychiatric: no anxiety, no depression, no insomnia  Hematologic/Lymphatic: no tendency for easy bleeding, no swollen lymph nodes, no tendency for easy bruising  Immunology: no seasonal allergies, no frequent infections, no frequent illnesses  Endocrine: no temperature intolerance    BP (!) 149/71   Pulse 82   Temp 98 °F (36.7 °C)   Resp 14   Ht 5' 1\" (1.549 m)   Wt 233 lb (105.7 kg)   SpO2 98%   BMI 44.02 kg/m²    Wt Readings from Last 3 Encounters:   09/07/22 233 lb (105.7 kg)   05/18/22 229 lb (103.9 kg)   03/16/22 228 lb (103.4 kg)     Body mass index is 44.02 kg/m².       OBJECTIVE:  Constitutional: no acute distress, well appearing and well nourished  Psychiatric: oriented to person, place and time, judgement and insight and normal, recent and remote memory and intact and mood and affect are normal  Skin: skin and subcutaneous tissue is normal without mass, normal turgor  Head and Face: examination of head and face revealed no abnormalities  Eyes: no lid or conjunctival swelling, erythema or discharge, pupils are normal, equal, round, reactive to light  Ears/Nose: external inspection of ears and nose revealed no abnormalities, hearing is grossly normal  Oropharynx/Mouth/Face: lips, tongue and gums are normal with no lesions, the voice quality was normal  Neck: neck is supple and symmetric, with midline trachea and no masses, thyroid is enlarged  Lymphatics: normal cervical lymph nodes, normal supraclavicular nodes  Pulmonary: no increased work of breathing or signs of respiratory distress, lungs are clear to auscultation  Cardiovascular: normal heart rate and rhythm, normal S1 and S2, no murmurs and pedal pulses and 2+ bilaterally, 1+ edema right, no left  Abdomen: abdomen is soft, non-tender with no masses  Musculoskeletal: normal gait and station and exam of the digits and nails are normal  Neurological: normal coordination and normal general cortical function    Visual inspection:  Deformity/amputation: absent  Skin lesions/pre-ulcerative 6/28/2022: Atypia of undetermined significance. Afirma benign  Completed:     06/16/2022   Perform Phys:  Sarah Martin MD   ADDENDUM:   'Danyelirma' molecular genetic analysis is reported as having a   Genomic Sequencing  of \"Benign\"; see also that report, [facility]   Requisition number [id number] for analytic details, a copy scanned into   Epic. Glo Arroyo M.D.   (Electronic Signature)   06/28/2022   FINAL DIAGNOSIS:   Thyroid, right, fine needle aspiration:   Atypia of Undetermined Significance. Thyroid follicular cells focally with enlarged, irregular nuclear   features, nuclear overlap and occasional nuclear inclusions. BRATI/BRATI   CLINICAL HISTORY/DIAGNOSIS:    Dx : Right thyroid 1.7 cm FNA nodule     SPECIMEN:   THYROID, RIGHT THYROID 1.7 CM NODULE     - US HEAD NECK SOFT TISSUE THYROID; Future    6. Hypertension  Continue metoprolol, benazepril  -CMP    7. Elevated liver function test  -18-13  AST 79-16  Improved    8. Alopecia   Improved, no more hair loss  Stopped Rogaine  DHEAS normal    9. Hashimoto's thyroiditis  TSH 2.22  Follow TSH, FT4  . Reviewed and/or ordered clinical lab results Yes  Reviewed and/or ordered radiology tests Yes  Reviewed and/or ordered other diagnostic tests No  Discussed test results with performing physician No  Independently reviewed image, tracing, or specimen No  Made a decision to obtain old records No  Reviewed and summarized old records Yes   Microalbumin creatinine ratio 115.4      AST 79  Hemoglobin A1c 8.0  Obtained history from other than patient No    Jamal Duty Bleser was counseled regarding symptoms of daibetes, hair loss diagnosis, course and complications of disease if inadequately treated, side effects of medications, diagnosis, treatment options, and prognosis, risks, benefits, complications, and alternatives of treatment, labs, imaging and other studies and treatment targets and goals.   She understands instructions and counseling. These diagnosis were discussed and reviewed with the patient including the advantages of drug therapy. She was counseled at this visit on the following: diabetes complication prevention and foot care. CGMS Download Review and Recommendations  See scanned document for blood glucose tracing documentation  [employer] personal CGMS data downloaded and reviewed. This was separate service provided/CGM interpretation    Average glucose 184± 29.8 SD  Time in range: 47%  Time above 180: 57%  Time under 70: 0%   GMI 7.7    Basal pattern review: Basal hyperglycemia  Postprandial pattern review: Postprandial hyperglycemia  Hypoglycemia review: No hypoglycemia   Activity related review: Not recorded       Based on the data, I recommend:    1. Continue adjusting long-acting insulin. 2.  Continue adjusting prandial insulin    3. Dietitian consultation    4. Do timely boluses, do not underestimate carbohydrates    5. Healthy eating, portion control      Return in about 3 months (around 12/7/2022) for diabetes.     Electronically signed by Vinicius Hermosillo MD on 9/8/2022 at 12:24 AM

## 2022-10-13 DIAGNOSIS — E10.40 POORLY CONTROLLED TYPE 1 DIABETES MELLITUS WITH NEUROPATHY (HCC): ICD-10-CM

## 2022-10-13 DIAGNOSIS — E10.65 POORLY CONTROLLED TYPE 1 DIABETES MELLITUS WITH NEUROPATHY (HCC): ICD-10-CM

## 2022-10-13 RX ORDER — INSULIN GLARGINE 100 [IU]/ML
INJECTION, SOLUTION SUBCUTANEOUS
Qty: 60 ML | Refills: 1 | Status: SHIPPED | OUTPATIENT
Start: 2022-10-13

## 2022-11-02 NOTE — TELEPHONE ENCOUNTER
Please advise  Last ov 9/17/20      Dr Lynnette Butts    Can you please refill my Crestor 20mg   I didnt realize I needed a refill and Im almost out  [facility]   If you have any questions you can reach me at [phone]   Thank you   Ligia Gallegos
no

## 2023-01-12 DIAGNOSIS — E10.40 POORLY CONTROLLED TYPE 1 DIABETES MELLITUS WITH NEUROPATHY (HCC): ICD-10-CM

## 2023-01-12 DIAGNOSIS — E10.65 POORLY CONTROLLED TYPE 1 DIABETES MELLITUS WITH NEUROPATHY (HCC): ICD-10-CM

## 2023-01-12 RX ORDER — INSULIN GLARGINE 100 [IU]/ML
INJECTION, SOLUTION SUBCUTANEOUS
Qty: 60 ML | Refills: 0 | Status: SHIPPED | OUTPATIENT
Start: 2023-01-12

## 2023-03-29 DIAGNOSIS — E10.40 POORLY CONTROLLED TYPE 1 DIABETES MELLITUS WITH NEUROPATHY (HCC): ICD-10-CM

## 2023-03-29 DIAGNOSIS — E10.65 POORLY CONTROLLED TYPE 1 DIABETES MELLITUS WITH NEUROPATHY (HCC): ICD-10-CM

## 2023-03-29 RX ORDER — BLOOD-GLUCOSE SENSOR
EACH MISCELLANEOUS
Qty: 6 EACH | Refills: 0 | Status: SHIPPED | OUTPATIENT
Start: 2023-03-29 | End: 2023-05-10 | Stop reason: SDUPTHER

## 2023-04-06 DIAGNOSIS — E10.40 POORLY CONTROLLED TYPE 1 DIABETES MELLITUS WITH NEUROPATHY (HCC): ICD-10-CM

## 2023-04-06 DIAGNOSIS — E10.65 POORLY CONTROLLED TYPE 1 DIABETES MELLITUS WITH NEUROPATHY (HCC): ICD-10-CM

## 2023-05-10 ENCOUNTER — OFFICE VISIT (OUTPATIENT)
Dept: ENDOCRINOLOGY | Age: 58
End: 2023-05-10
Payer: OTHER GOVERNMENT

## 2023-05-10 ENCOUNTER — TELEPHONE (OUTPATIENT)
Dept: ENDOCRINOLOGY | Age: 58
End: 2023-05-10

## 2023-05-10 VITALS
SYSTOLIC BLOOD PRESSURE: 151 MMHG | WEIGHT: 239 LBS | TEMPERATURE: 98 F | OXYGEN SATURATION: 99 % | HEIGHT: 61 IN | HEART RATE: 69 BPM | BODY MASS INDEX: 45.12 KG/M2 | DIASTOLIC BLOOD PRESSURE: 77 MMHG | RESPIRATION RATE: 14 BRPM

## 2023-05-10 DIAGNOSIS — I51.89 DIASTOLIC DYSFUNCTION WITHOUT HEART FAILURE: ICD-10-CM

## 2023-05-10 DIAGNOSIS — E04.2 MULTINODULAR GOITER: ICD-10-CM

## 2023-05-10 DIAGNOSIS — E10.21 DIABETIC NEPHROPATHY ASSOCIATED WITH TYPE 1 DIABETES MELLITUS (HCC): ICD-10-CM

## 2023-05-10 DIAGNOSIS — R79.89 ELEVATED LIVER FUNCTION TESTS: ICD-10-CM

## 2023-05-10 DIAGNOSIS — E66.01 CLASS 3 SEVERE OBESITY WITH SERIOUS COMORBIDITY AND BODY MASS INDEX (BMI) OF 45.0 TO 49.9 IN ADULT, UNSPECIFIED OBESITY TYPE (HCC): ICD-10-CM

## 2023-05-10 DIAGNOSIS — E83.52 HYPERCALCEMIA: ICD-10-CM

## 2023-05-10 DIAGNOSIS — E78.2 MIXED HYPERLIPIDEMIA: ICD-10-CM

## 2023-05-10 DIAGNOSIS — E10.65 POORLY CONTROLLED TYPE 1 DIABETES MELLITUS WITH NEUROPATHY (HCC): Primary | ICD-10-CM

## 2023-05-10 DIAGNOSIS — L65.9 ALOPECIA: ICD-10-CM

## 2023-05-10 DIAGNOSIS — E10.40 POORLY CONTROLLED TYPE 1 DIABETES MELLITUS WITH NEUROPATHY (HCC): Primary | ICD-10-CM

## 2023-05-10 DIAGNOSIS — I10 PRIMARY HYPERTENSION: ICD-10-CM

## 2023-05-10 PROBLEM — E66.813 CLASS 3 SEVERE OBESITY WITH BODY MASS INDEX (BMI) OF 45.0 TO 49.9 IN ADULT: Status: ACTIVE | Noted: 2023-05-10

## 2023-05-10 PROCEDURE — 3078F DIAST BP <80 MM HG: CPT | Performed by: INTERNAL MEDICINE

## 2023-05-10 PROCEDURE — 99214 OFFICE O/P EST MOD 30 MIN: CPT | Performed by: INTERNAL MEDICINE

## 2023-05-10 PROCEDURE — 3077F SYST BP >= 140 MM HG: CPT | Performed by: INTERNAL MEDICINE

## 2023-05-10 PROCEDURE — 95251 CONT GLUC MNTR ANALYSIS I&R: CPT | Performed by: INTERNAL MEDICINE

## 2023-05-10 RX ORDER — BLOOD-GLUCOSE SENSOR
EACH MISCELLANEOUS
Qty: 6 EACH | Refills: 2 | Status: SHIPPED | OUTPATIENT
Start: 2023-05-10

## 2023-05-10 RX ORDER — INSULIN GLARGINE 100 [IU]/ML
INJECTION, SOLUTION SUBCUTANEOUS
Qty: 60 ML | Refills: 2 | Status: SHIPPED | OUTPATIENT
Start: 2023-05-10

## 2023-05-10 RX ORDER — SEMAGLUTIDE 0.25 MG/.5ML
0.25 INJECTION, SOLUTION SUBCUTANEOUS
Qty: 2 ML | Refills: 0 | Status: SHIPPED | OUTPATIENT
Start: 2023-05-10

## 2023-05-11 ENCOUNTER — TELEPHONE (OUTPATIENT)
Dept: ENDOCRINOLOGY | Age: 58
End: 2023-05-11

## 2023-05-11 RX ORDER — HYDROCHLOROTHIAZIDE 12.5 MG/1
12.5 CAPSULE, GELATIN COATED ORAL DAILY
COMMUNITY

## 2023-05-11 NOTE — TELEPHONE ENCOUNTER
1.  Please contact Om[employer] and [employer] educators. Patient wants to know more insulin pumps in order to make a decision which one to go with.   2.  Patient stated that she is on hydrochlorothiazide, I do not see that in her med list.  Please check with patient and update  med list.

## 2023-05-11 NOTE — TELEPHONE ENCOUNTER
Spoke w/ pt. Added rx to list.     Pt also states she needs a Pa for wegovy. Routing separate message to PA team.     Sent email to [address].

## 2023-05-11 NOTE — TELEPHONE ENCOUNTER
[facility]    4. Obesity  Recommend [id number]. Counseled patient about side effects and benefits. Trulicity was not approved because of type 1 diabetes.   Diet and exercise  Dietitian consultation

## 2023-05-11 NOTE — TELEPHONE ENCOUNTER
Has the patient tried and failed or has a contraindication to ALL of the following agents: generic phentermine, Qsymia, Xenical, and Contrave? Note: The dates and durations of therapy for each medication or contraindication to each medication must be provided or your case could be denied. Please advise if pt can be switched to one of these medications. Thanks!

## 2023-05-12 ENCOUNTER — PATIENT MESSAGE (OUTPATIENT)
Dept: ENDOCRINOLOGY | Age: 58
End: 2023-05-12

## 2023-05-17 DIAGNOSIS — E83.52 HYPERCALCEMIA: ICD-10-CM

## 2023-05-17 LAB
ALBUMIN SERPL-MCNC: 4.6 G/DL (ref 3.4–5)
ALBUMIN/GLOB SERPL: 1.6 {RATIO} (ref 1.1–2.2)
ALP SERPL-CCNC: 96 U/L (ref 40–129)
ALT SERPL-CCNC: 14 U/L (ref 10–40)
ANION GAP SERPL CALCULATED.3IONS-SCNC: 13 MMOL/L (ref 3–16)
AST SERPL-CCNC: 15 U/L (ref 15–37)
BILIRUB SERPL-MCNC: 0.3 MG/DL (ref 0–1)
BUN SERPL-MCNC: 13 MG/DL (ref 7–20)
CALCIUM 24H UR-MRATE: 53 MG/24 HR (ref 42–353)
CALCIUM SERPL-MCNC: 9.6 MG/DL (ref 8.3–10.6)
CHLORIDE SERPL-SCNC: 102 MMOL/L (ref 99–110)
CO2 SERPL-SCNC: 27 MMOL/L (ref 21–32)
COLLECT DURATION TIME UR: 24 HOURS
COLLECT DURATION TIME UR: 24 HR
CREAT 24H UR-MRATE: 1.6 G/24HR (ref 0.6–1.5)
CREAT CL 24H UR+SERPL-VRATE: 116 ML/MIN (ref 88–128)
CREAT SERPL-MCNC: 0.8 MG/DL (ref 0.6–1.1)
CREAT SERPL-MCNC: 0.8 MG/DL (ref 0.6–1.2)
GFR SERPLBLD CREATININE-BSD FMLA CKD-EPI: >60 ML/MIN/{1.73_M2}
GLUCOSE SERPL-MCNC: 189 MG/DL (ref 70–99)
MAGNESIUM SERPL-MCNC: 2 MG/DL (ref 1.8–2.4)
PHOSPHATE SERPL-MCNC: 3.9 MG/DL (ref 2.5–4.9)
POTASSIUM SERPL-SCNC: 4.7 MMOL/L (ref 3.5–5.1)
PTH-INTACT SERPL-MCNC: 53.3 PG/ML (ref 14–72)
SODIUM 24H UR-SRATE: 202 MMOL/24 HR (ref 40–220)
SODIUM SERPL-SCNC: 142 MMOL/L (ref 136–145)
SPECIMEN VOL 24H UR: 4400 ML

## 2023-05-17 NOTE — TELEPHONE ENCOUNTER
From: KOKO Beckwith Cable  To: Neil gee  Sent: 5/12/2023 8:23 AM EDT  Subject: CHELI Pavon,    Please see our Prior Authorization Team's message below: \"Has the patient tried and failed or has a contraindication to ALL of the following agents: generic phentermine, Qsymia, Xenical, and Contrave? Note: The dates and durations of therapy for each medication or contraindication to each medication must be provided or your case could be denied. Please advise if pt can be switched to one of these medications.  \"    Thanks,  Danny Carcamo LPN

## 2023-05-17 NOTE — TELEPHONE ENCOUNTER
Spoke w/ pt. Pt has not tried any of these alternatives    Please advise    She wants something to help w/ wt loss and that doesn't drop her sugars.

## 2023-05-18 NOTE — TELEPHONE ENCOUNTER
I do not recommend Qsymia or phentermine because of hypertension. Xenical can cause diarrhea, any history of problem with GI tract? Contrave can cause nausea. Let me know patient's thoughts.   The best would be to discuss this during appointment

## 2023-05-18 NOTE — TELEPHONE ENCOUNTER
I spoke with patient, reviewed results. She will do diet and exercise for now, eat healthy and revisit in November.

## 2023-05-18 NOTE — TELEPHONE ENCOUNTER
Good Afternoon     If Dr. Nam Wilson doesn't prefer another option and I agree with her, can they summit a pre-approval for the regular med she wants me to take. Also does anyone know how much this would cost without coverage. Maybe I could afford it, depending on cost.     Also my test results are in, could you please let me know if my calcium level is an issue and if I need any other testing? I would appreciate it very much.      Thank you     Charon Meigs

## 2023-05-19 LAB
ALBUMIN SERPL ELPH-MCNC: 3.8 G/DL (ref 3.1–4.9)
ALPHA1 GLOB SERPL ELPH-MCNC: 0.3 G/DL (ref 0.2–0.4)
ALPHA2 GLOB SERPL ELPH-MCNC: 0.9 G/DL (ref 0.4–1.1)
B-GLOBULIN SERPL ELPH-MCNC: 1.3 G/DL (ref 0.9–1.6)
CA-I ADJ PH7.4 SERPL-SCNC: 1.22 MMOL/L (ref 1.09–1.3)
CA-I SERPL ISE-SCNC: 1.2 MMOL/L (ref 1.09–1.3)
GAMMA GLOB SERPL ELPH-MCNC: 1.1 G/DL (ref 0.6–1.8)
PROT SERPL-MCNC: 7.4 G/DL (ref 6.4–8.2)
SPE/IFE INTERPRETATION: NORMAL

## 2023-05-21 LAB
COLLECT DURATION TIME SPEC: NORMAL H
CORTIS F 24H UR HPLC-MCNC: 9.55 UG/L
CORTIS F 24H UR-MRATE: 22.9 UG/D
CORTIS F/CREAT 24H UR: 22.74 UG/G CRT
CREAT 24H UR-MCNC: 42 MG/DL
CREAT 24H UR-MRATE: 1008 MG/D (ref 500–1400)
IMP & REVIEW OF LAB RESULTS: NORMAL
SPECIMEN VOL ?TM UR: 2400 ML

## 2023-06-20 ENCOUNTER — TELEPHONE (OUTPATIENT)
Dept: ENDOCRINOLOGY | Age: 58
End: 2023-06-20

## 2023-06-20 NOTE — TELEPHONE ENCOUNTER
Please initiate PA for Omnipod 5. Also, pt will be swapping to [facility] with the Omnipod 5. I am not sure if she will need a PA for Dexcom, but I can call her pharmacy and ask if needed.  She is currently on [place]

## 2023-06-20 NOTE — TELEPHONE ENCOUNTER
Maximiliano Casillas Galeana: Baltazar BOWER Case ID: [id number]  Outcome  Approved today  CaseId:[id number];Status:Approved; Review Type:Prior Auth; Coverage Start Date:05/21/2023; Coverage End Date:06/19/2024;  Drug  Omnipod 5 G6 Intro (Gen 5) kit

## 2023-06-20 NOTE — TELEPHONE ENCOUNTER
Khushbu Harmon Galeana: Benja Michael PA Case ID: [id number] - Rx #: [id number]  Outcome  Approved today  [id number];TVOJQO:[id number]; Review Type:Prior Auth; Coverage Start Date:05/21/2023; Coverage End Date:06/19/2024;  Drug  Omnipod 5 G6 Pod (Gen 5)      Khushbu Harmon Key: [id number] - PA Case ID: [id number] STATUS:PENDING  Drug  Omnipod 5 G6 Intro (Gen 5) kit

## 2023-06-21 ENCOUNTER — TELEPHONE (OUTPATIENT)
Dept: ENDOCRINOLOGY | Age: 58
End: 2023-06-21

## 2023-07-30 ENCOUNTER — TELEPHONE (OUTPATIENT)
Dept: ENDOCRINOLOGY | Age: 58
End: 2023-07-30

## 2023-07-31 NOTE — TELEPHONE ENCOUNTER
Patient lost her PDM so cannot use OmniPod anymore, she will disconnect her OmniPod and go back to basal bolus insulin regimen which she is very familiar with advised her to reduce the dose of Lantus from typically 45 units that she previously took to 35 units tonight and take Humalog as per meal boluses

## 2023-08-01 NOTE — TELEPHONE ENCOUNTER
Please call patient and ask how she is doing. If she is going to resume OmniPod later or if she is going to just stay with injections?

## 2023-08-16 ENCOUNTER — PATIENT MESSAGE (OUTPATIENT)
Dept: ENDOCRINOLOGY | Age: 58
End: 2023-08-16

## 2023-08-16 DIAGNOSIS — E10.40 POORLY CONTROLLED TYPE 1 DIABETES MELLITUS WITH NEUROPATHY (HCC): ICD-10-CM

## 2023-08-16 DIAGNOSIS — E10.65 POORLY CONTROLLED TYPE 1 DIABETES MELLITUS WITH NEUROPATHY (HCC): ICD-10-CM

## 2023-08-16 RX ORDER — PROCHLORPERAZINE 25 MG/1
SUPPOSITORY RECTAL
Qty: 9 EACH | Refills: 1 | Status: SHIPPED | OUTPATIENT
Start: 2023-08-16

## 2023-08-16 RX ORDER — PROCHLORPERAZINE 25 MG/1
SUPPOSITORY RECTAL
Qty: 1 EACH | Refills: 1 | Status: SHIPPED | OUTPATIENT
Start: 2023-08-16

## 2023-08-16 RX ORDER — INSULIN PMP CART,AUT,G6/7,CNTR
EACH SUBCUTANEOUS
Qty: 30 EACH | Refills: 1 | Status: SHIPPED | OUTPATIENT
Start: 2023-08-16

## 2023-08-16 NOTE — TELEPHONE ENCOUNTER
From: Raffi Snell  To: Dr. Jocelyn Looney  Sent: 8/16/2023 1:23 PM EDT  Subject: Prescribitions    Good Afternoon Uzma    I just sent in 3 prescriptions to be [facility] to [facility] instead of [address]. I am not sure if [facility] covers these prescriptions, but I would like to see. Thank you very much. Have a good day.     Thank you    Francisco Gregory

## 2023-11-01 ENCOUNTER — OFFICE VISIT (OUTPATIENT)
Dept: ENDOCRINOLOGY | Age: 58
End: 2023-11-01
Payer: OTHER GOVERNMENT

## 2023-11-01 VITALS
OXYGEN SATURATION: 97 % | BODY MASS INDEX: 44.37 KG/M2 | DIASTOLIC BLOOD PRESSURE: 67 MMHG | HEART RATE: 69 BPM | WEIGHT: 235 LBS | RESPIRATION RATE: 14 BRPM | SYSTOLIC BLOOD PRESSURE: 162 MMHG | HEIGHT: 61 IN | TEMPERATURE: 98 F

## 2023-11-01 DIAGNOSIS — R79.89 ELEVATED LIVER FUNCTION TESTS: ICD-10-CM

## 2023-11-01 DIAGNOSIS — E10.65 POORLY CONTROLLED TYPE 1 DIABETES MELLITUS WITH NEUROPATHY (HCC): Primary | ICD-10-CM

## 2023-11-01 DIAGNOSIS — I10 PRIMARY HYPERTENSION: ICD-10-CM

## 2023-11-01 DIAGNOSIS — E78.2 MIXED HYPERLIPIDEMIA: ICD-10-CM

## 2023-11-01 DIAGNOSIS — E10.40 POORLY CONTROLLED TYPE 1 DIABETES MELLITUS WITH NEUROPATHY (HCC): Primary | ICD-10-CM

## 2023-11-01 DIAGNOSIS — L65.9 ALOPECIA: ICD-10-CM

## 2023-11-01 DIAGNOSIS — I51.89 DIASTOLIC DYSFUNCTION WITHOUT HEART FAILURE: ICD-10-CM

## 2023-11-01 DIAGNOSIS — E04.2 MULTINODULAR GOITER: ICD-10-CM

## 2023-11-01 DIAGNOSIS — E83.52 HYPERCALCEMIA: ICD-10-CM

## 2023-11-01 DIAGNOSIS — E66.01 CLASS 3 SEVERE OBESITY WITH SERIOUS COMORBIDITY AND BODY MASS INDEX (BMI) OF 45.0 TO 49.9 IN ADULT, UNSPECIFIED OBESITY TYPE (HCC): ICD-10-CM

## 2023-11-01 DIAGNOSIS — E10.21 DIABETIC NEPHROPATHY ASSOCIATED WITH TYPE 1 DIABETES MELLITUS (HCC): ICD-10-CM

## 2023-11-01 PROCEDURE — 95251 CONT GLUC MNTR ANALYSIS I&R: CPT | Performed by: INTERNAL MEDICINE

## 2023-11-01 PROCEDURE — 3078F DIAST BP <80 MM HG: CPT | Performed by: INTERNAL MEDICINE

## 2023-11-01 PROCEDURE — 99214 OFFICE O/P EST MOD 30 MIN: CPT | Performed by: INTERNAL MEDICINE

## 2023-11-01 PROCEDURE — 3077F SYST BP >= 140 MM HG: CPT | Performed by: INTERNAL MEDICINE

## 2023-11-01 RX ORDER — UBIDECARENONE 200 MG
200 CAPSULE ORAL DAILY
COMMUNITY

## 2023-11-01 RX ORDER — CELECOXIB 200 MG/1
200 CAPSULE ORAL DAILY
COMMUNITY
Start: 2023-10-03

## 2023-11-01 NOTE — PROGRESS NOTES
Paco Solomon is a 63 y.o. female who is being evaluated for Type 1 diabetes mellitus. Current symptoms/problems include none and hyperglycemia  and are worsening. Poorly controlled type 1 diabetes mellitus with neuropathy (HCC) [E10.40, E10.65]    Diagnosed with Type 1 diabetes mellitus in 1974. Comorbid conditions:  hyperlipidemia, HTN, Neuropathy and Nephropathy    Current diabetic medications include: Humalog  Omnipod 5/Dexcom 7    Intolerance to diabetes medications: No  Had melanoma, needs steroids at times    Weight trend: stable  Prior visit with dietician: yes  Current diet: on average, 3 meals per day  Current exercise: has back and hip pain, walks      Current monitoring regimen: home blood tests - 4 times daily  Has brought blood glucose log/meter:  Yes  Home blood sugar records: fasting range:  and postprandial range: 100-250   Any episodes of hypoglycemia? Yes  Hypoglycemia frequency and time(s):  rare  Does patient have Glucagon emergency kit? No  Does patient have rapid acting carbohydrate? Yes  Does patient wear a medic alert bracelet or necklace? No    2. Diabetic nephropathy  No urination problems    3. Mixed hyperlipidemia  No muscle pain    4. Obesity  Can not exercise because of back problems  On low carb diet, high fiber diet  Eats healthy    5. Multinodular goiter  No difficulty swallowing, voice change  No family history of thyroid cancer  No radiation the neck  Right 1.4, 1.2, 1.3 cm nodules    6. Hypertension  No headaches    7. Elevated liver function test  No abdominal pain, nausea, vomiting    8. Alopecia   Had severe hair loss, improved  Started 4 weeks    9. Hashimoto's thyroiditis  Has fatigue    10.   Hypercalcemia  No numbness or tingling        5/3/2022  EXAM: US THYROID       INDICATION: Multinodular goiter       COMPARISON: 2019       FINDINGS:       RIGHT LOBE: Measures 5.4 x 1.8 x 2.9 cm       LEFT LOBE: Measures 5.8 x 1.8 x 1.8 cm       ISTHMUS:

## 2024-02-02 DIAGNOSIS — E10.40 POORLY CONTROLLED TYPE 1 DIABETES MELLITUS WITH NEUROPATHY (HCC): ICD-10-CM

## 2024-02-02 DIAGNOSIS — E10.65 POORLY CONTROLLED TYPE 1 DIABETES MELLITUS WITH NEUROPATHY (HCC): ICD-10-CM

## 2024-02-02 RX ORDER — PROCHLORPERAZINE 25 MG/1
SUPPOSITORY RECTAL
Qty: 9 EACH | Refills: 1 | Status: SHIPPED | OUTPATIENT
Start: 2024-02-02

## 2024-02-02 RX ORDER — PROCHLORPERAZINE 25 MG/1
SUPPOSITORY RECTAL
Qty: 1 EACH | Refills: 1 | Status: SHIPPED | OUTPATIENT
Start: 2024-02-02

## 2024-02-05 RX ORDER — INSULIN LISPRO 100 [IU]/ML
INJECTION, SOLUTION INTRAVENOUS; SUBCUTANEOUS
Qty: 120 ML | Refills: 3 | Status: SHIPPED | OUTPATIENT
Start: 2024-02-05

## 2024-03-07 ENCOUNTER — TELEPHONE (OUTPATIENT)
Dept: ENDOCRINOLOGY | Age: 59
End: 2024-03-07

## 2024-03-07 DIAGNOSIS — E10.40 POORLY CONTROLLED TYPE 1 DIABETES MELLITUS WITH NEUROPATHY (HCC): ICD-10-CM

## 2024-03-07 DIAGNOSIS — E10.65 POORLY CONTROLLED TYPE 1 DIABETES MELLITUS WITH NEUROPATHY (HCC): ICD-10-CM

## 2024-03-07 RX ORDER — PROCHLORPERAZINE 25 MG/1
SUPPOSITORY RECTAL
Qty: 1 EACH | Refills: 1 | Status: SHIPPED | OUTPATIENT
Start: 2024-03-07

## 2024-03-07 NOTE — TELEPHONE ENCOUNTER
[facility] called and said dexcom g6 is not available and would like to know what the dr would like to change to. Please advise.

## 2024-03-07 NOTE — TELEPHONE ENCOUNTER
Pt is on OP5, there are no other compatible CGM w/ this system.    Spoke w/pt. Pt states she's been getting the sensors, she's just not getting the transmitter. She stated she called dexcom company and they dont have any reports of backorder. Sent rx to local.

## 2024-03-24 PROBLEM — E55.9 VITAMIN D DEFICIENCY: Status: ACTIVE | Noted: 2024-03-24

## 2024-03-24 PROBLEM — E06.3 HASHIMOTO'S THYROIDITIS: Status: ACTIVE | Noted: 2024-03-24

## 2024-03-24 PROBLEM — E10.40 TYPE 1 DIABETES, CONTROLLED, WITH NEUROPATHY (HCC): Status: ACTIVE | Noted: 2024-03-24

## 2024-03-24 NOTE — PROGRESS NOTES
Valerie L Bleser is a 59 y.o. female who is being evaluated for Type 1 diabetes mellitus.     Current symptoms/problems include none and hyperglycemia  and are worsening.     Type 1 diabetes, controlled, with neuropathy (HCC) [E10.40]    Diagnosed with Type 1 diabetes mellitus in 1974.     Comorbid conditions:  hyperlipidemia, HTN, Neuropathy and Nephropathy    Current diabetic medications include: Humalog  Omnipod 5/Dexcom 7    Intolerance to diabetes medications: No  Had melanoma, needs steroids at times    Weight trend: stable  Prior visit with dietician: yes  Current diet: on average, 3 meals per day  Current exercise: has back and hip pain, walks      Current monitoring regimen: home blood tests - 4 times daily  Has brought blood glucose log/meter:  Yes  Home blood sugar records: fasting range:  and postprandial range: 100-250   Any episodes of hypoglycemia? Yes  Hypoglycemia frequency and time(s):  rare  Does patient have Glucagon emergency kit? No  Does patient have rapid acting carbohydrate?  Yes  Does patient wear a medic alert bracelet or necklace?  No    2.  Diabetic nephropathy  No urination problems    3. Mixed hyperlipidemia  No muscle pain    4.  Obesity  Can not exercise because of back problems  On low carb diet, high fiber diet  Eats healthy    5. Multinodular goiter  No difficulty swallowing, voice change  No family history of thyroid cancer  No radiation the neck  Right 1.4, 1.2, 1.3 cm nodules    6.  Hypertension  No headaches    7.  Elevated liver function test  No abdominal pain, nausea, vomiting    8. Hashimoto's thyroiditis  Has fatigue    9.  Hypercalcemia  No numbness or tingling    10.  Vitamin D deficiency  Has fatigue      5/3/2022  EXAM: US THYROID       INDICATION: Multinodular goiter       COMPARISON: 2019       FINDINGS:       RIGHT LOBE: Measures 5.4 x 1.8 x 2.9 cm       LEFT LOBE: Measures 5.8 x 1.8 x 1.8 cm       ISTHMUS: Measures 0.8 cm       NODULES:       RIGHT LOBE: 3

## 2024-03-25 ENCOUNTER — OFFICE VISIT (OUTPATIENT)
Dept: ENDOCRINOLOGY | Age: 59
End: 2024-03-25
Payer: OTHER GOVERNMENT

## 2024-03-25 VITALS
OXYGEN SATURATION: 97 % | TEMPERATURE: 98 F | BODY MASS INDEX: 44.56 KG/M2 | SYSTOLIC BLOOD PRESSURE: 153 MMHG | RESPIRATION RATE: 14 BRPM | HEART RATE: 71 BPM | DIASTOLIC BLOOD PRESSURE: 72 MMHG | WEIGHT: 236 LBS | HEIGHT: 61 IN

## 2024-03-25 DIAGNOSIS — E10.21 DIABETIC NEPHROPATHY ASSOCIATED WITH TYPE 1 DIABETES MELLITUS (HCC): ICD-10-CM

## 2024-03-25 DIAGNOSIS — E10.65 POORLY CONTROLLED TYPE 1 DIABETES MELLITUS WITH NEUROPATHY (HCC): ICD-10-CM

## 2024-03-25 DIAGNOSIS — E04.2 MULTINODULAR GOITER: ICD-10-CM

## 2024-03-25 DIAGNOSIS — E06.3 HASHIMOTO'S THYROIDITIS: ICD-10-CM

## 2024-03-25 DIAGNOSIS — E55.9 VITAMIN D DEFICIENCY: ICD-10-CM

## 2024-03-25 DIAGNOSIS — E10.40 TYPE 1 DIABETES, CONTROLLED, WITH NEUROPATHY (HCC): Primary | ICD-10-CM

## 2024-03-25 DIAGNOSIS — E66.01 CLASS 3 SEVERE OBESITY WITH SERIOUS COMORBIDITY AND BODY MASS INDEX (BMI) OF 40.0 TO 44.9 IN ADULT, UNSPECIFIED OBESITY TYPE (HCC): ICD-10-CM

## 2024-03-25 DIAGNOSIS — I10 PRIMARY HYPERTENSION: ICD-10-CM

## 2024-03-25 DIAGNOSIS — R79.89 ELEVATED LIVER FUNCTION TESTS: ICD-10-CM

## 2024-03-25 DIAGNOSIS — E83.52 HYPERCALCEMIA: ICD-10-CM

## 2024-03-25 DIAGNOSIS — E78.2 MIXED HYPERLIPIDEMIA: ICD-10-CM

## 2024-03-25 DIAGNOSIS — E10.40 POORLY CONTROLLED TYPE 1 DIABETES MELLITUS WITH NEUROPATHY (HCC): ICD-10-CM

## 2024-03-25 LAB
CREAT UR-MCNC: 84.2 MG/DL (ref 28–259)
MICROALBUMIN UR DL<=1MG/L-MCNC: <1.2 MG/DL
MICROALBUMIN/CREAT UR: NORMAL MG/G (ref 0–30)

## 2024-03-25 PROCEDURE — 99214 OFFICE O/P EST MOD 30 MIN: CPT | Performed by: INTERNAL MEDICINE

## 2024-03-25 PROCEDURE — 3078F DIAST BP <80 MM HG: CPT | Performed by: INTERNAL MEDICINE

## 2024-03-25 PROCEDURE — 3077F SYST BP >= 140 MM HG: CPT | Performed by: INTERNAL MEDICINE

## 2024-03-25 PROCEDURE — 95251 CONT GLUC MNTR ANALYSIS I&R: CPT | Performed by: INTERNAL MEDICINE

## 2024-03-25 RX ORDER — INSULIN LISPRO 100 [IU]/ML
INJECTION, SOLUTION INTRAVENOUS; SUBCUTANEOUS
Qty: 120 ML | Refills: 3
Start: 2024-03-25

## 2024-03-25 RX ORDER — INSULIN LISPRO 100 [IU]/ML
INJECTION, SOLUTION INTRAVENOUS; SUBCUTANEOUS
Qty: 120 ML | Refills: 3
Start: 2024-03-25 | End: 2024-03-25 | Stop reason: SDUPTHER

## 2024-03-25 RX ORDER — INSULIN PMP CART,AUT,G6/7,CNTR
EACH SUBCUTANEOUS
Qty: 45 EACH | Refills: 1 | Status: SHIPPED | OUTPATIENT
Start: 2024-03-25 | End: 2024-03-25 | Stop reason: SDUPTHER

## 2024-03-25 RX ORDER — INSULIN PMP CART,AUT,G6/7,CNTR
EACH SUBCUTANEOUS
Qty: 45 EACH | Refills: 1 | Status: SHIPPED | OUTPATIENT
Start: 2024-03-25

## 2024-03-25 RX ORDER — SULFAMETHOXAZOLE AND TRIMETHOPRIM 800; 160 MG/1; MG/1
1 TABLET ORAL 2 TIMES DAILY
COMMUNITY
Start: 2024-03-21

## 2024-03-28 ENCOUNTER — PATIENT MESSAGE (OUTPATIENT)
Dept: ENDOCRINOLOGY | Age: 59
End: 2024-03-28

## 2024-03-28 NOTE — TELEPHONE ENCOUNTER
From: Valerie L Bleser  To: Dr. Varsha Saldana  Sent: 3/28/2024 4:16 PM EDT  Subject: Scans    Good Afternoon     Dr. Saldana during our visit on Monday, I had concerns that my PET Scan and MRI of the Brain may have results that were going to lead to more treatment for my Melanoma. Well, my Oncologist confirmed yesterday that I do have tumors in the Brain, too small to biopsy but between 5-6. I also have a spot on my lung which will be biopsied. My question for you is, of course they want to do Immunotherapy and the side effects give a 60 percent chance of compilations that I would need to be put on steroids, but a very high dose 60mg for 3-4 weeks.   Can my Diabetes handle that, can my pump keep up with a situation like that. I don't have a lot of choices at this point but need to know if it's possible.  Sorry for overwhelming information, but I am so confused right now with all these decisions to make.  Also do you know of any Oncologist that have done Immunotherapy with Diabetes Type 1.  Please let me know your opinion on this matter.     Thank you    Patito

## 2024-03-28 NOTE — TELEPHONE ENCOUNTER
I spoke with patient.  Discussed how high-dose steroid caused hyperglycemia can be handled, including NPH insulin injections, switching to basal bolus regimen, using pump and sensor and supplementing with correctional doses.  Advised patient to let me know when it starts so we can be aware of adjustments.  If prednisone will be given as a 1 dose in the morning, consider NPH injection.

## 2024-03-28 NOTE — TELEPHONE ENCOUNTER
My question for you is, of course they want to do Immunotherapy and the side effects give a 60 percent chance of compilations that I would need to be put on steroids, but a very high dose 60mg for 3-4 weeks.    Can my Diabetes handle that, can my pump keep up with a situation like that.  I don't have a lot of choices at this point but need to know if it's possible.  Sorry for overwhelming information, but I am so confused right now with all these decisions to make.  Also do you know of any Oncologist that have done Immunotherapy with Diabetes Type 1.

## 2024-04-04 ENCOUNTER — TELEPHONE (OUTPATIENT)
Dept: ENDOCRINOLOGY | Age: 59
End: 2024-04-04

## 2024-04-05 ENCOUNTER — TELEPHONE (OUTPATIENT)
Dept: ENDOCRINOLOGY | Age: 59
End: 2024-04-05

## 2024-04-09 ENCOUNTER — TELEPHONE (OUTPATIENT)
Dept: ENDOCRINOLOGY | Age: 59
End: 2024-04-09

## 2024-04-23 ENCOUNTER — TELEPHONE (OUTPATIENT)
Dept: ENDOCRINOLOGY | Age: 59
End: 2024-04-23

## 2024-04-23 NOTE — TELEPHONE ENCOUNTER
I spoke with Dr. Aragon.  She informed me that for melanoma treatment patient was started on immunotherapy with 2 agents.  Patient developed diarrhea and possibly might need steroids.    I called patient and discussed the options of managing diabetes when on steroids, as using sensor for frequent corrections, adding NPH insulin or using rapid acting insulin with frequent corrections during high blood glucose times.  Asked patient to let us know if she starts steroids and when.

## 2024-04-23 NOTE — TELEPHONE ENCOUNTER
Call from Dr. Perera requesting to speak with Dr. Saldana to discuss the pt's history. She stated that she is treating the pt for Melanoma and would like to discuss with Dr. Les Perera can be reached at [phone]    Please advise

## 2024-04-28 DIAGNOSIS — E10.40 POORLY CONTROLLED TYPE 1 DIABETES MELLITUS WITH NEUROPATHY (HCC): ICD-10-CM

## 2024-04-28 DIAGNOSIS — E10.65 POORLY CONTROLLED TYPE 1 DIABETES MELLITUS WITH NEUROPATHY (HCC): ICD-10-CM

## 2024-04-29 RX ORDER — PROCHLORPERAZINE 25 MG/1
SUPPOSITORY RECTAL
Qty: 1 EACH | Refills: 1 | Status: SHIPPED | OUTPATIENT
Start: 2024-04-29

## 2024-05-01 ENCOUNTER — PATIENT MESSAGE (OUTPATIENT)
Dept: ENDOCRINOLOGY | Age: 59
End: 2024-05-01

## 2024-05-01 ENCOUNTER — CLINICAL DOCUMENTATION (OUTPATIENT)
Dept: ENDOCRINOLOGY | Age: 59
End: 2024-05-01

## 2024-05-01 DIAGNOSIS — E10.40 TYPE 1 DIABETES, CONTROLLED, WITH NEUROPATHY (HCC): Primary | ICD-10-CM

## 2024-05-01 DIAGNOSIS — E05.90 HYPERTHYROIDISM: ICD-10-CM

## 2024-05-01 DIAGNOSIS — E10.9 TYPE 1 DIABETES MELLITUS ON INSULIN THERAPY (HCC): ICD-10-CM

## 2024-05-01 RX ORDER — METOPROLOL SUCCINATE 50 MG/1
50 TABLET, EXTENDED RELEASE ORAL 2 TIMES DAILY
Qty: 60 TABLET | Refills: 3
Start: 2024-05-01 | End: 2024-05-03 | Stop reason: SDUPTHER

## 2024-05-01 NOTE — TELEPHONE ENCOUNTER
Spoke with Dr. Vargas.    Reviewed abnormal thyroid function tests consistent with hyperthyroidism.    TSH<0.02, free T4  3.74, total T3   270.6.  Cortisol 7.3, ACTH 14.8.  Spoke with patient.  She does not have palpitations, insomnia, but complains of diarrhea and weight loss.  She is currently on metoprolol ER 50 mg twice daily.  Updated med list.  Sent prescription for NPH insulin 25 units to inject in the morning when takes prednisone.  Patient prefers vials and already have syringes at home.  I asked patient to notify me daily about blood glucose levels and symptoms.    Labs in 1 week.

## 2024-05-01 NOTE — TELEPHONE ENCOUNTER
From: Valerie L Bleser  To: Dr. Varsha Saldana  Sent: 5/1/2024 2:03 PM EDT  Subject: Steriods    Dr. Saldana my oncologist is going to put me 50mg pretizone once a day. I need to know if I can start that tomorrow morning or she would like me to start tonight is that possible.  And how do you want me to handle sugars to start?    Thank you    Patito

## 2024-05-01 NOTE — PROGRESS NOTES
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 Dickerson as she was planning to start NPH for steroids . Also informed about thyroid labs     Called Dr. Bell back and inform the patient will be started on NPH after prednisone. It might be better to match prednisone and NPH in am.

## 2024-05-02 ENCOUNTER — PATIENT MESSAGE (OUTPATIENT)
Dept: ENDOCRINOLOGY | Age: 59
End: 2024-05-02

## 2024-05-02 DIAGNOSIS — R00.2 PALPITATIONS: Primary | ICD-10-CM

## 2024-05-02 DIAGNOSIS — E05.90 HYPERTHYROIDISM: ICD-10-CM

## 2024-05-02 DIAGNOSIS — E10.9 TYPE 1 DIABETES MELLITUS ON INSULIN THERAPY (HCC): ICD-10-CM

## 2024-05-02 LAB
ANTI-THYROGLOB ABS: 509 IU/ML
T3 SERPL-MCNC: 3.22 NG/ML (ref 0.8–2)
T3FREE SERPL-MCNC: 12.6 PG/ML (ref 2.3–4.2)
T4 FREE SERPL-MCNC: 4.3 NG/DL (ref 0.9–1.8)
T4 SERPL-MCNC: 21.2 UG/DL (ref 4.5–10.9)
THYROPEROXIDASE AB SERPL IA-ACNC: 375 IU/ML
TSH SERPL DL<=0.005 MIU/L-ACNC: 0.01 UIU/ML (ref 0.27–4.2)

## 2024-05-02 RX ORDER — METHIMAZOLE 10 MG/1
20 TABLET ORAL DAILY
Qty: 60 TABLET | Refills: 0 | Status: SHIPPED | OUTPATIENT
Start: 2024-05-02 | End: 2024-05-03 | Stop reason: SDUPTHER

## 2024-05-02 NOTE — TELEPHONE ENCOUNTER
I spoke with Dr. Staton.  Immunotherapy was administered on 4/10/2024.  I discussed that diarrhea and weight loss may be related to hyperthyroidism.  Patient does not have palpitations, anxiety, insomnia, and has mild hot flashes and sweating.  The adrenergic symptoms might be not as pronounced because she is on beta-blocker.  Per Dr. Staton, the other possibility might be colitis.  Will treat empirically with methimazole 20 mg daily.  Sent prescription to pharmacy.  Counseled patient about side effect of methimazole.  Ordered lab tests, including TSI and TRAb.  Patient had CT of the abdomen without contrast.  Patient is willing to come for appointment in a short notice, please schedule her if we get cancellations on Monday or another day of the week.

## 2024-05-02 NOTE — TELEPHONE ENCOUNTER
Dr Saldana can you please give Dr Staton a call Today.  She needs to clarify a few things with you about my syptoms and the thyroid results before she can make her decision on how to treat me.  This is important because she is holding all treatment until she speaks to you directly, she is holding steriods also.  Thank you very much         -Clari Staton MD  B[facility] Oncologist, Hematology and Oncology  Since 4/1/2021  [phone]

## 2024-05-03 RX ORDER — METOPROLOL SUCCINATE 50 MG/1
TABLET, EXTENDED RELEASE ORAL
Qty: 90 TABLET | Refills: 0 | Status: SHIPPED | OUTPATIENT
Start: 2024-05-03

## 2024-05-03 RX ORDER — METHIMAZOLE 10 MG/1
30 TABLET ORAL DAILY
Qty: 90 TABLET | Refills: 0 | Status: SHIPPED | OUTPATIENT
Start: 2024-05-03 | End: 2024-06-02

## 2024-05-03 NOTE — TELEPHONE ENCOUNTER
Patient developed increased in symptoms of palpitations 3 times a day were some shortness of breath.  Episodes last 5 to 6 seconds.  She also has some hot flashes.  She denies sweating, tremor, insomnia.    She describes her bowel movements as loose bowel movements in the morning, and diarrhea-like bowel movements later in the afternoon, no blood.  She had some abdominal cramping.  No fever.    Patient had 3 bowel movements prior to taking Imodium, and no bowel movement since she took Imodium.  She also was able to eat and tolerated food.    Reviewed available lab results.  TSI and TRAb pending.  Worsening of total T3 and free T4.  Advised patient to increase methimazole to 3 tablets daily.    Patient did not  methimazole yet.  She will start it after she picks it up.  Patient is aware about side effect as rash, notify me of sore throat, abdominal pain, nausea, vomiting.  Patient is aware to check her blood pressure and heart rate with her home monitor and do not take additional metoprolol if systolic blood pressure below 100.    Patient is also aware to go to the ER if severe worsening of palpitations, arrhythmia, shortness of breath, chest pain, nausea, vomiting, abdominal pain, dehydration or worsening of any other symptoms.

## 2024-05-03 NOTE — TELEPHONE ENCOUNTER
From: Valerie L Bleser  To: Dr. Varsha Saldana  Sent: 5/2/2024 4:57 PM EDT  Subject: Prescription     Dr. Saldana    Just checked my [employer] [facility] and did not see the prescription that you and Dr. Staton want me to start. Dr. Staton said that you were going to order prescription, has this been called in by chance?    Thank you    Patito

## 2024-05-03 NOTE — TELEPHONE ENCOUNTER
Wanted to let Dr. Saldana know I am starting to get more palpitations and just some what out of breath at times, from the thyroid being high I think. I am already taking Meoprolol 50mg 2x a day. Is there anything else to help with this, or will the new prescription of Methimazole help?

## 2024-05-04 LAB
TSH RECEP AB SER-ACNC: <1.1 IU/L
TSI SER-ACNC: 0.21 IU/L

## 2024-05-04 NOTE — TELEPHONE ENCOUNTER
Reviewed lab results.  TSI and TRAb negative.  Will discuss with patient.  Unlikely Graves' disease.  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

## 2024-05-06 ENCOUNTER — OFFICE VISIT (OUTPATIENT)
Dept: ENDOCRINOLOGY | Age: 59
End: 2024-05-06
Payer: OTHER GOVERNMENT

## 2024-05-06 VITALS
WEIGHT: 219 LBS | HEIGHT: 61 IN | SYSTOLIC BLOOD PRESSURE: 145 MMHG | TEMPERATURE: 98 F | OXYGEN SATURATION: 97 % | HEART RATE: 83 BPM | DIASTOLIC BLOOD PRESSURE: 56 MMHG | RESPIRATION RATE: 14 BRPM | BODY MASS INDEX: 41.35 KG/M2

## 2024-05-06 DIAGNOSIS — R63.4 WEIGHT LOSS: ICD-10-CM

## 2024-05-06 DIAGNOSIS — E04.2 MULTINODULAR GOITER: ICD-10-CM

## 2024-05-06 DIAGNOSIS — E10.40 TYPE 1 DIABETES, CONTROLLED, WITH NEUROPATHY (HCC): Primary | ICD-10-CM

## 2024-05-06 DIAGNOSIS — E78.2 MIXED HYPERLIPIDEMIA: ICD-10-CM

## 2024-05-06 DIAGNOSIS — E55.9 VITAMIN D DEFICIENCY: ICD-10-CM

## 2024-05-06 DIAGNOSIS — E05.90 HYPERTHYROIDISM: ICD-10-CM

## 2024-05-06 DIAGNOSIS — E06.3 HASHIMOTO'S THYROIDITIS: ICD-10-CM

## 2024-05-06 DIAGNOSIS — C43.9 MALIGNANT MELANOMA, UNSPECIFIED SITE (HCC): ICD-10-CM

## 2024-05-06 DIAGNOSIS — E66.01 CLASS 3 SEVERE OBESITY WITH SERIOUS COMORBIDITY AND BODY MASS INDEX (BMI) OF 40.0 TO 44.9 IN ADULT, UNSPECIFIED OBESITY TYPE (HCC): ICD-10-CM

## 2024-05-06 DIAGNOSIS — I10 PRIMARY HYPERTENSION: ICD-10-CM

## 2024-05-06 DIAGNOSIS — E10.21 DIABETIC NEPHROPATHY ASSOCIATED WITH TYPE 1 DIABETES MELLITUS (HCC): ICD-10-CM

## 2024-05-06 LAB
ALBUMIN SERPL-MCNC: 3.7 G/DL (ref 3.4–5)
ALBUMIN/GLOB SERPL: 1.3 {RATIO} (ref 1.1–2.2)
ALP SERPL-CCNC: 86 U/L (ref 40–129)
ALT SERPL-CCNC: 23 U/L (ref 10–40)
ANION GAP SERPL CALCULATED.3IONS-SCNC: 13 MMOL/L (ref 3–16)
AST SERPL-CCNC: 24 U/L (ref 15–37)
BASOPHILS # BLD: 0 K/UL (ref 0–0.2)
BASOPHILS NFR BLD: 1 %
BILIRUB SERPL-MCNC: 0.4 MG/DL (ref 0–1)
BUN SERPL-MCNC: 18 MG/DL (ref 7–20)
CALCIUM SERPL-MCNC: 9.8 MG/DL (ref 8.3–10.6)
CHLORIDE SERPL-SCNC: 96 MMOL/L (ref 99–110)
CO2 SERPL-SCNC: 28 MMOL/L (ref 21–32)
CREAT SERPL-MCNC: 1.1 MG/DL (ref 0.6–1.1)
DEPRECATED RDW RBC AUTO: 13.1 % (ref 12.4–15.4)
EOSINOPHIL # BLD: 0.1 K/UL (ref 0–0.6)
EOSINOPHIL NFR BLD: 2.4 %
GFR SERPLBLD CREATININE-BSD FMLA CKD-EPI: 58 ML/MIN/{1.73_M2}
GLUCOSE SERPL-MCNC: 161 MG/DL (ref 70–99)
HCT VFR BLD AUTO: 31.6 % (ref 36–48)
HGB BLD-MCNC: 11.1 G/DL (ref 12–16)
LYMPHOCYTES # BLD: 1.6 K/UL (ref 1–5.1)
LYMPHOCYTES NFR BLD: 35.6 %
MCH RBC QN AUTO: 31.6 PG (ref 26–34)
MCHC RBC AUTO-ENTMCNC: 35.2 G/DL (ref 31–36)
MCV RBC AUTO: 89.7 FL (ref 80–100)
MONOCYTES # BLD: 0.6 K/UL (ref 0–1.3)
MONOCYTES NFR BLD: 14.1 %
NEUTROPHILS # BLD: 2.1 K/UL (ref 1.7–7.7)
NEUTROPHILS NFR BLD: 46.9 %
PLATELET # BLD AUTO: 140 K/UL (ref 135–450)
PMV BLD AUTO: 10.5 FL (ref 5–10.5)
POTASSIUM SERPL-SCNC: 4.2 MMOL/L (ref 3.5–5.1)
PROT SERPL-MCNC: 6.5 G/DL (ref 6.4–8.2)
RBC # BLD AUTO: 3.52 M/UL (ref 4–5.2)
SODIUM SERPL-SCNC: 137 MMOL/L (ref 136–145)
T4 FREE SERPL-MCNC: 3.3 NG/DL (ref 0.9–1.8)
TSH SERPL DL<=0.005 MIU/L-ACNC: 0.01 UIU/ML (ref 0.27–4.2)
WBC # BLD AUTO: 4.5 K/UL (ref 4–11)

## 2024-05-06 PROCEDURE — 3078F DIAST BP <80 MM HG: CPT | Performed by: INTERNAL MEDICINE

## 2024-05-06 PROCEDURE — 99215 OFFICE O/P EST HI 40 MIN: CPT | Performed by: INTERNAL MEDICINE

## 2024-05-06 PROCEDURE — 3077F SYST BP >= 140 MM HG: CPT | Performed by: INTERNAL MEDICINE

## 2024-05-06 PROCEDURE — 95251 CONT GLUC MNTR ANALYSIS I&R: CPT | Performed by: INTERNAL MEDICINE

## 2024-05-06 RX ORDER — METOPROLOL SUCCINATE 50 MG/1
50 TABLET, EXTENDED RELEASE ORAL 2 TIMES DAILY
Qty: 60 TABLET | Refills: 0
Start: 2024-05-06

## 2024-05-06 NOTE — PROGRESS NOTES
Valerie L Bleser is a 59 y.o. female who is being evaluated for Type 1 diabetes mellitus.     Current symptoms/problems include none and hyperglycemia  and are worsening.     Type 1 diabetes, controlled, with neuropathy (HCC) [E10.40]    Diagnosed with Type 1 diabetes mellitus in 1974.     Comorbid conditions:  hyperlipidemia, HTN, Neuropathy and Nephropathy    Current diabetic medications include: Humalog  Omnipod 5/Dexcom 7    Intolerance to diabetes medications: No  Had melanoma, needs steroids at times    Weight trend: stable  Prior visit with dietician: yes  Current diet: on average, 3 meals per day  Current exercise: has back and hip pain, walks      Current monitoring regimen: home blood tests - 4 times daily  Has brought blood glucose log/meter:  Yes  Home blood sugar records: fasting range:  and postprandial range: 100-250   Any episodes of hypoglycemia? Yes  Hypoglycemia frequency and time(s):  rare  Does patient have Glucagon emergency kit? No  Does patient have rapid acting carbohydrate?  Yes  Does patient wear a medic alert bracelet or necklace?  No    2.  Diabetic nephropathy  No urination problems    3. Mixed hyperlipidemia  No muscle pain    4.  Obesity  Can not exercise because of back problems  On low carb diet, high fiber diet  Eats healthy    5. Multinodular goiter  No difficulty swallowing, voice change  No family history of thyroid cancer  No radiation the neck    6.  Hypertension  No headaches    7. Hashimoto's thyroiditis  Has fatigue    8.  Vitamin D deficiency  Has fatigue    9.  Malignant melanoma, unspecified site (HCC)  Patient received a treatment with nivolumab/ipilimumab on 4/10/2024 i  Presently treatment is on hold because she developed side effects.    10.  Hyperthyroidism  Patient started having diarrhea after she received treatment with nivolumab/ipilimumab on 4/10/2024.  Diarrhea started within days.  However, patient also had COVID to the same week and received

## 2024-05-07 ENCOUNTER — TELEPHONE (OUTPATIENT)
Dept: ENDOCRINOLOGY | Age: 59
End: 2024-05-07

## 2024-05-07 DIAGNOSIS — E06.3 HASHIMOTO'S THYROIDITIS: ICD-10-CM

## 2024-05-07 DIAGNOSIS — E78.2 MIXED HYPERLIPIDEMIA: ICD-10-CM

## 2024-05-07 DIAGNOSIS — E05.90 HYPERTHYROIDISM: ICD-10-CM

## 2024-05-07 DIAGNOSIS — E55.9 VITAMIN D DEFICIENCY: ICD-10-CM

## 2024-05-07 DIAGNOSIS — R63.4 WEIGHT LOSS: ICD-10-CM

## 2024-05-07 DIAGNOSIS — E10.40 TYPE 1 DIABETES, CONTROLLED, WITH NEUROPATHY (HCC): ICD-10-CM

## 2024-05-07 DIAGNOSIS — I10 PRIMARY HYPERTENSION: ICD-10-CM

## 2024-05-07 LAB
CORTIS AM PEAK SERPL-MCNC: 7.8 UG/DL (ref 4.3–22.4)
T3 SERPL-MCNC: 2.92 NG/ML (ref 0.8–2)
T3FREE SERPL-MCNC: 9.3 PG/ML (ref 2.3–4.2)
T4 SERPL-MCNC: 17.2 UG/DL (ref 4.5–10.9)

## 2024-05-07 NOTE — TELEPHONE ENCOUNTER
I spoke with patient.  She had 3 bowel movements today without Imodium.  She had dinner.  No vomiting today.  Informed patient that thyroid tests improved.  Cortisol appropriate.  Continue monitoring.  Same methimazole dose.

## 2024-05-09 ENCOUNTER — PATIENT MESSAGE (OUTPATIENT)
Dept: ENDOCRINOLOGY | Age: 59
End: 2024-05-09

## 2024-05-09 DIAGNOSIS — E05.90 HYPERTHYROIDISM: Primary | ICD-10-CM

## 2024-05-10 RX ORDER — METHIMAZOLE 10 MG/1
20 TABLET ORAL DAILY
Qty: 90 TABLET | Refills: 0
Start: 2024-05-10 | End: 2024-06-09

## 2024-05-10 NOTE — TELEPHONE ENCOUNTER
I spoke with patient.  Advised that itching is common on methimazole.  Try not to scratch the skin and use Benadryl or Claritin.  If she develops rash all over the body which looks like hives, then stop methimazole completely.  Decrease methimazole to 20 mg daily.  Labs on Monday.

## 2024-05-13 ENCOUNTER — TELEPHONE (OUTPATIENT)
Dept: ENDOCRINOLOGY | Age: 59
End: 2024-05-13

## 2024-05-13 DIAGNOSIS — E10.40 TYPE 1 DIABETES, CONTROLLED, WITH NEUROPATHY (HCC): ICD-10-CM

## 2024-05-13 DIAGNOSIS — E05.90 HYPERTHYROIDISM: Primary | ICD-10-CM

## 2024-05-13 DIAGNOSIS — E05.90 HYPERTHYROIDISM: ICD-10-CM

## 2024-05-13 RX ORDER — INSULIN PMP CART,AUT,G6/7,CNTR
EACH SUBCUTANEOUS
Qty: 30 EACH | Refills: 1 | Status: SHIPPED | OUTPATIENT
Start: 2024-05-13

## 2024-05-13 NOTE — TELEPHONE ENCOUNTER
Left patient a message.  Called to see how she is doing.  Plan was to start steroids, and start NPH insulin, ask about blood glucose levels.

## 2024-05-13 NOTE — TELEPHONE ENCOUNTER
Submitted PA for Omnipod Pods  Via CMM Key: [id number]    STATUS: Approved today  CaseId:[id number];Status:Approved;Review Type:Prior Auth;Coverage Start Date:2024;Coverage End Date:2025;

## 2024-05-14 LAB
ALBUMIN SERPL-MCNC: 3.9 G/DL (ref 3.4–5)
ALBUMIN/GLOB SERPL: 1.5 {RATIO} (ref 1.1–2.2)
ALP SERPL-CCNC: 104 U/L (ref 40–129)
ALT SERPL-CCNC: 31 U/L (ref 10–40)
ANION GAP SERPL CALCULATED.3IONS-SCNC: 13 MMOL/L (ref 3–16)
AST SERPL-CCNC: 27 U/L (ref 15–37)
BASOPHILS # BLD: 0 K/UL (ref 0–0.2)
BASOPHILS NFR BLD: 0.6 %
BILIRUB SERPL-MCNC: 0.3 MG/DL (ref 0–1)
BUN SERPL-MCNC: 13 MG/DL (ref 7–20)
CALCIUM SERPL-MCNC: 9.8 MG/DL (ref 8.3–10.6)
CHLORIDE SERPL-SCNC: 99 MMOL/L (ref 99–110)
CO2 SERPL-SCNC: 26 MMOL/L (ref 21–32)
CREAT SERPL-MCNC: 0.8 MG/DL (ref 0.6–1.1)
DEPRECATED RDW RBC AUTO: 12.9 % (ref 12.4–15.4)
EOSINOPHIL # BLD: 0.2 K/UL (ref 0–0.6)
EOSINOPHIL NFR BLD: 4.5 %
GFR SERPLBLD CREATININE-BSD FMLA CKD-EPI: 85 ML/MIN/{1.73_M2}
GLUCOSE SERPL-MCNC: 124 MG/DL (ref 70–99)
HCT VFR BLD AUTO: 33.4 % (ref 36–48)
HGB BLD-MCNC: 11.5 G/DL (ref 12–16)
LYMPHOCYTES # BLD: 1.6 K/UL (ref 1–5.1)
LYMPHOCYTES NFR BLD: 31.8 %
MCH RBC QN AUTO: 30.7 PG (ref 26–34)
MCHC RBC AUTO-ENTMCNC: 34.4 G/DL (ref 31–36)
MCV RBC AUTO: 89.2 FL (ref 80–100)
MONOCYTES # BLD: 0.7 K/UL (ref 0–1.3)
MONOCYTES NFR BLD: 12.9 %
NEUTROPHILS # BLD: 2.6 K/UL (ref 1.7–7.7)
NEUTROPHILS NFR BLD: 50.2 %
PLATELET # BLD AUTO: 195 K/UL (ref 135–450)
PMV BLD AUTO: 10.4 FL (ref 5–10.5)
POTASSIUM SERPL-SCNC: 4.3 MMOL/L (ref 3.5–5.1)
PROT SERPL-MCNC: 6.5 G/DL (ref 6.4–8.2)
RBC # BLD AUTO: 3.75 M/UL (ref 4–5.2)
SODIUM SERPL-SCNC: 138 MMOL/L (ref 136–145)
T3 SERPL-MCNC: 1.99 NG/ML (ref 0.8–2)
T3FREE SERPL-MCNC: 5.7 PG/ML (ref 2.3–4.2)
T4 FREE SERPL-MCNC: 1.9 NG/DL (ref 0.9–1.8)
T4 SERPL-MCNC: 12 UG/DL (ref 4.5–10.9)
TSH SERPL DL<=0.005 MIU/L-ACNC: <0.01 UIU/ML (ref 0.27–4.2)
WBC # BLD AUTO: 5.1 K/UL (ref 4–11)

## 2024-05-14 NOTE — TELEPHONE ENCOUNTER
Spoke with patient.  She is feeling better.  She did not start steroids because her bowel movements improved.  She still had 3-4 bowel movements, but they were more formed.  Improving thyroid function tests.  Patient has some itching, but not severe, did not need to take Benadryl.  Repeat labs in 1 week.  Most likely will be able to decrease methimazole further.  Continue close monitoring.  Patient will call if any worsening or if symptoms resolve.

## 2024-05-15 LAB
TSH RECEP AB SER-ACNC: <1.1 IU/L
TSI SER-ACNC: 0.17 IU/L

## 2024-05-17 ENCOUNTER — PATIENT MESSAGE (OUTPATIENT)
Dept: ENDOCRINOLOGY | Age: 59
End: 2024-05-17

## 2024-05-20 DIAGNOSIS — E05.90 HYPERTHYROIDISM: ICD-10-CM

## 2024-05-20 LAB
ALBUMIN SERPL-MCNC: 3.9 G/DL (ref 3.4–5)
ALBUMIN/GLOB SERPL: 1.6 {RATIO} (ref 1.1–2.2)
ALP SERPL-CCNC: 96 U/L (ref 40–129)
ALT SERPL-CCNC: 20 U/L (ref 10–40)
ANION GAP SERPL CALCULATED.3IONS-SCNC: 9 MMOL/L (ref 3–16)
AST SERPL-CCNC: 19 U/L (ref 15–37)
BASOPHILS # BLD: 0.1 K/UL (ref 0–0.2)
BASOPHILS NFR BLD: 1 %
BILIRUB SERPL-MCNC: 0.3 MG/DL (ref 0–1)
BUN SERPL-MCNC: 9 MG/DL (ref 7–20)
CALCIUM SERPL-MCNC: 9.4 MG/DL (ref 8.3–10.6)
CHLORIDE SERPL-SCNC: 102 MMOL/L (ref 99–110)
CO2 SERPL-SCNC: 27 MMOL/L (ref 21–32)
CREAT SERPL-MCNC: 0.7 MG/DL (ref 0.6–1.1)
DEPRECATED RDW RBC AUTO: 13 % (ref 12.4–15.4)
EOSINOPHIL # BLD: 0.2 K/UL (ref 0–0.6)
EOSINOPHIL NFR BLD: 3.4 %
GFR SERPLBLD CREATININE-BSD FMLA CKD-EPI: >90 ML/MIN/{1.73_M2}
GLUCOSE SERPL-MCNC: 177 MG/DL (ref 70–99)
HCT VFR BLD AUTO: 33.9 % (ref 36–48)
HGB BLD-MCNC: 11.5 G/DL (ref 12–16)
LYMPHOCYTES # BLD: 1.4 K/UL (ref 1–5.1)
LYMPHOCYTES NFR BLD: 27.1 %
MCH RBC QN AUTO: 30 PG (ref 26–34)
MCHC RBC AUTO-ENTMCNC: 33.8 G/DL (ref 31–36)
MCV RBC AUTO: 88.9 FL (ref 80–100)
MONOCYTES # BLD: 0.5 K/UL (ref 0–1.3)
MONOCYTES NFR BLD: 9.6 %
NEUTROPHILS # BLD: 3.1 K/UL (ref 1.7–7.7)
NEUTROPHILS NFR BLD: 58.9 %
PLATELET # BLD AUTO: 188 K/UL (ref 135–450)
PMV BLD AUTO: 10.9 FL (ref 5–10.5)
POTASSIUM SERPL-SCNC: 4.4 MMOL/L (ref 3.5–5.1)
PROT SERPL-MCNC: 6.3 G/DL (ref 6.4–8.2)
RBC # BLD AUTO: 3.82 M/UL (ref 4–5.2)
SODIUM SERPL-SCNC: 138 MMOL/L (ref 136–145)
T3 SERPL-MCNC: 1.51 NG/ML (ref 0.8–2)
T3FREE SERPL-MCNC: 4.1 PG/ML (ref 2.3–4.2)
T4 FREE SERPL-MCNC: 1.4 NG/DL (ref 0.9–1.8)
T4 SERPL-MCNC: 9.4 UG/DL (ref 4.5–10.9)
TSH SERPL DL<=0.005 MIU/L-ACNC: 0.01 UIU/ML (ref 0.27–4.2)
WBC # BLD AUTO: 5.3 K/UL (ref 4–11)

## 2024-05-21 ENCOUNTER — PATIENT MESSAGE (OUTPATIENT)
Dept: ENDOCRINOLOGY | Age: 59
End: 2024-05-21

## 2024-05-21 DIAGNOSIS — E05.90 HYPERTHYROIDISM: Primary | ICD-10-CM

## 2024-05-21 RX ORDER — METHIMAZOLE 10 MG/1
5 TABLET ORAL DAILY
Qty: 15 TABLET | Refills: 0
Start: 2024-05-21 | End: 2024-05-22 | Stop reason: SDUPTHER

## 2024-05-21 NOTE — TELEPHONE ENCOUNTER
From: Valerie L Bleser  To: Dr. Varsha Saldana  Sent: 5/21/2024 4:46 PM EDT  Subject: Lalo Oakes Afternoon Uzma and Dr Saldana    My thyroid results are back from Monday.  Do you want me to lower my Methiamzole?    Patito

## 2024-05-21 NOTE — TELEPHONE ENCOUNTER
I called patient, unable to reach her.  Left patient a message to decrease methimazole to 5 mg daily.  Will need to clarify if she can cut 10 mg tablet in half or wants me to send prescription for 5 mg tablets.  Lab work in 1 week

## 2024-05-21 NOTE — TELEPHONE ENCOUNTER
Let patient another message to decrease methimazole to 10 mg half tablet daily, total daily dose 5 mg, or let us know if she wants me to send prescription for 5 mg tablets.  Lab work in 1 week.  Also asked her to let me know how she feels and if she is symptomatic.

## 2024-05-22 RX ORDER — METHIMAZOLE 10 MG/1
TABLET ORAL
Qty: 15 TABLET | Refills: 0
Start: 2024-05-22

## 2024-05-22 NOTE — TELEPHONE ENCOUNTER
I spoke with patient.  She feels better, but still has frequent bowel movements.  Made the decision to decrease methimazole to 5 mg alternating with 10 mg every other day.  Lab work in 1 week.  If more symptomatic, increase methimazole to 10 mg daily.

## 2024-05-23 ENCOUNTER — PATIENT MESSAGE (OUTPATIENT)
Dept: ENDOCRINOLOGY | Age: 59
End: 2024-05-23

## 2024-05-24 NOTE — TELEPHONE ENCOUNTER
From: Valerie L Bleser  To: Dr. Varsha Saldana  Sent: 5/23/2024 7:56 PM EDT  Subject: Steriods    Good Evening    I am sending you this update to let you know that I will be starting Steroids tomorrow morning, or today depending on when you read this. I am having 4 loose stools a day again. Dr. Staton wants me to start 50mg of Prednisone once a day, and she will taper my dose down as my symptoms go away. I will start the NPH along with the Steroids in the morning, starting at 15 units as we discussed previously. I will check in with you and let you know how it is going. If you want me to do anything other than what we discussed, please let me know. I am very nervous, but hopeful this will help my symptoms. Do I need to still take the Methaimazole while on steroids?   Thank you for everything you are doing to get me pass this.    Thanks    Patito

## 2024-05-24 NOTE — TELEPHONE ENCOUNTER
I left patient a message to decrease NPH to 12 units and decrease further to 10 if blood glucose low in the morning.  Continue methimazole 5 mg alternating with 10 mg every other day.

## 2024-05-26 ENCOUNTER — PATIENT MESSAGE (OUTPATIENT)
Dept: ENDOCRINOLOGY | Age: 59
End: 2024-05-26

## 2024-05-28 ENCOUNTER — PATIENT MESSAGE (OUTPATIENT)
Dept: ENDOCRINOLOGY | Age: 59
End: 2024-05-28

## 2024-05-28 DIAGNOSIS — E05.90 HYPERTHYROIDISM: ICD-10-CM

## 2024-05-28 DIAGNOSIS — E05.90 HYPERTHYROIDISM: Primary | ICD-10-CM

## 2024-05-28 NOTE — TELEPHONE ENCOUNTER
From: Valerie L Bleser  To: Dr. Varsha Saldana  Sent: 5/28/2024 11:15 AM EDT  Subject: Update    Dr. Saldana     With the holiday yesterday I didn't do blood. Going in today to do it.  Things are going okay on steriods, average of 2 stools a day, tomorrow going down to 40mg steriods.  Hope you had nice weekend.  Any questions let me know.

## 2024-05-28 NOTE — TELEPHONE ENCOUNTER
From: Valerie L Bleser  To: Dr. Varsha Saldana  Sent: 5/26/2024 10:40 PM EDT  Subject: Update    Good Evening   Still taking 50mg of Steriods in morning.  Sugars are good in morning and early afternoon with 12 units NPH in morning at 9am.  But I am having to Bolus a lot in the Evening.  Is there anything else you want me to do?  Please let me know.    Thank you    Patito

## 2024-05-29 LAB
ALBUMIN SERPL-MCNC: 4.2 G/DL (ref 3.4–5)
ALBUMIN/GLOB SERPL: 1.8 {RATIO} (ref 1.1–2.2)
ALP SERPL-CCNC: 78 U/L (ref 40–129)
ALT SERPL-CCNC: 14 U/L (ref 10–40)
ANION GAP SERPL CALCULATED.3IONS-SCNC: 15 MMOL/L (ref 3–16)
AST SERPL-CCNC: 14 U/L (ref 15–37)
BASOPHILS # BLD: 0 K/UL (ref 0–0.2)
BASOPHILS NFR BLD: 0.2 %
BILIRUB SERPL-MCNC: <0.2 MG/DL (ref 0–1)
BUN SERPL-MCNC: 10 MG/DL (ref 7–20)
CALCIUM SERPL-MCNC: 9.5 MG/DL (ref 8.3–10.6)
CHLORIDE SERPL-SCNC: 99 MMOL/L (ref 99–110)
CO2 SERPL-SCNC: 26 MMOL/L (ref 21–32)
CREAT SERPL-MCNC: 0.8 MG/DL (ref 0.6–1.1)
DEPRECATED RDW RBC AUTO: 12.9 % (ref 12.4–15.4)
EOSINOPHIL # BLD: 0 K/UL (ref 0–0.6)
EOSINOPHIL NFR BLD: 0 %
GFR SERPLBLD CREATININE-BSD FMLA CKD-EPI: 85 ML/MIN/{1.73_M2}
GLUCOSE SERPL-MCNC: 261 MG/DL (ref 70–99)
HCT VFR BLD AUTO: 33.7 % (ref 36–48)
HGB BLD-MCNC: 11.9 G/DL (ref 12–16)
LYMPHOCYTES # BLD: 1 K/UL (ref 1–5.1)
LYMPHOCYTES NFR BLD: 16.4 %
MCH RBC QN AUTO: 30.9 PG (ref 26–34)
MCHC RBC AUTO-ENTMCNC: 35.3 G/DL (ref 31–36)
MCV RBC AUTO: 87.3 FL (ref 80–100)
MONOCYTES # BLD: 0.1 K/UL (ref 0–1.3)
MONOCYTES NFR BLD: 2.1 %
NEUTROPHILS # BLD: 4.8 K/UL (ref 1.7–7.7)
NEUTROPHILS NFR BLD: 81.3 %
PLATELET # BLD AUTO: 176 K/UL (ref 135–450)
PMV BLD AUTO: 11 FL (ref 5–10.5)
POTASSIUM SERPL-SCNC: 3.8 MMOL/L (ref 3.5–5.1)
PROT SERPL-MCNC: 6.5 G/DL (ref 6.4–8.2)
RBC # BLD AUTO: 3.85 M/UL (ref 4–5.2)
SODIUM SERPL-SCNC: 140 MMOL/L (ref 136–145)
T3 SERPL-MCNC: 0.65 NG/ML (ref 0.8–2)
T3FREE SERPL-MCNC: 1.8 PG/ML (ref 2.3–4.2)
T4 FREE SERPL-MCNC: 1 NG/DL (ref 0.9–1.8)
T4 SERPL-MCNC: 5.7 UG/DL (ref 4.5–10.9)
TSH SERPL DL<=0.005 MIU/L-ACNC: 0.04 UIU/ML (ref 0.27–4.2)
WBC # BLD AUTO: 5.9 K/UL (ref 4–11)

## 2024-05-29 NOTE — TELEPHONE ENCOUNTER
Thank you Uzma, I did hear from her last night.  The test results she wanted are back this morning, everything is continuing to drop.  All I need to know is how to continue with the methiamazole, or if she wants me to discontinue it.

## 2024-05-29 NOTE — TELEPHONE ENCOUNTER
I spoke with patient.  She was advised to continue prednisone 50 mg daily for next 2-3 days.  Patient bowel movements decreased, then increased slightly again.  Overall she feels well.  Her blood glucose is controlled until 3 PM, then goes higher from 6 to 8:30 PM.  Advised to continue using CGM for correction when needed.  Blood glucose in the morning is in the 120s.    Await lab results, patient had them drawn today.

## 2024-05-30 LAB
TSH RECEP AB SER-ACNC: 1.33 IU/L
TSI SER-ACNC: 0.13 IU/L

## 2024-05-30 NOTE — TELEPHONE ENCOUNTER
I spoke with patient.  Blood glucose controlled.  Reviewed lab work.  Will discontinue methimazole.  Lab work on 6/4/2024.

## 2024-06-02 ENCOUNTER — TELEPHONE (OUTPATIENT)
Dept: ENDOCRINOLOGY | Age: 59
End: 2024-06-02

## 2024-06-05 DIAGNOSIS — E05.90 HYPERTHYROIDISM: ICD-10-CM

## 2024-06-05 LAB
T3 SERPL-MCNC: 0.45 NG/ML (ref 0.8–2)
T3FREE SERPL-MCNC: 1.1 PG/ML (ref 2.3–4.2)
T4 FREE SERPL-MCNC: 0.5 NG/DL (ref 0.9–1.8)
T4 SERPL-MCNC: 3.1 UG/DL (ref 4.5–10.9)
TSH SERPL DL<=0.005 MIU/L-ACNC: 5.62 UIU/ML (ref 0.27–4.2)

## 2024-06-06 ENCOUNTER — PATIENT MESSAGE (OUTPATIENT)
Dept: ENDOCRINOLOGY | Age: 59
End: 2024-06-06

## 2024-06-06 DIAGNOSIS — E03.9 ACQUIRED HYPOTHYROIDISM: Primary | ICD-10-CM

## 2024-06-06 RX ORDER — LEVOTHYROXINE SODIUM 0.03 MG/1
25 TABLET ORAL DAILY
Qty: 30 TABLET | Refills: 3 | Status: SHIPPED | OUTPATIENT
Start: 2024-06-06

## 2024-06-06 NOTE — TELEPHONE ENCOUNTER
From: Valerie L Bleser  To: Dr. Varsha Saldana  Sent: 6/6/2024 2:55 PM EDT  Subject: bloodwork    Good Afternoon     I just wanted to let Dr. Saldana know I did get my blood drawl yesterday for Thyroid numbers.    Also, I am now taking 40mg steroids and on Monday will drop down to 20mg and I am adjusting my NPH accordingly.    Thank you    Patito

## 2024-06-06 NOTE — TELEPHONE ENCOUNTER
I spoke with patient.  Advised to start levothyroxine 0.025 mg daily.  Sent prescription to pharmacy.  Continue adjustment on NPH insulin based on steroid tapering.  Patient will start new medications for melanoma treatment.  Ordered labs  to do in 1 week.

## 2024-06-09 PROBLEM — E06.9 THYROIDITIS: Status: ACTIVE | Noted: 2024-06-09

## 2024-06-09 PROBLEM — E03.9 ACQUIRED HYPOTHYROIDISM: Status: ACTIVE | Noted: 2024-06-09

## 2024-06-12 ENCOUNTER — TELEPHONE (OUTPATIENT)
Dept: ENDOCRINOLOGY | Age: 59
End: 2024-06-12

## 2024-06-12 DIAGNOSIS — E03.9 ACQUIRED HYPOTHYROIDISM: Primary | ICD-10-CM

## 2024-06-12 NOTE — TELEPHONE ENCOUNTER
After I had video encounter with patient on 6/11/2024, I realized that patient's residence is in [place].  Below is a summary of my encounter and recommendations which were discussed with patient in details.  Patient was notified that appointment will be canceled and she will not be charged.  1.  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  Negative TRAb and TSI.  Positive TPO ab and antithyroglobulin ab.  Started methimazole on 5/3/2024.  Discontinued methimazole 5/29/2024  Started levothyroxine on 6/6/2024  TSH 1.57-2.22-<0.02-0.01-5.62  Total T3 270.6-3.22-0.45  Free T4 3.74-4.3-0.5  Free T3 12.6-1.1  Total T4 21.2-3.1  TPO ab 375  Antithyroglobulin ab 509  TSI 0.21-0.13  TRAb<1.1-1.33  2.  Type 1 diabetes, controlled  Patient will continue NPH injections in the mornings at the same time when she takes prednisone.  Prednisone dose is being tapered per oncology guidance.  Patient is doing well overall with diabetes control.  She is aware about decreasing NPH doses while steroids are being tapered.  3.  Hypothyroidism  Most likely hypothyroid phase of thyroiditis  2-3 stools a day, softer, but normal.  Has blurred vision, but not daily.  Started levothyroxine on 6/6/2024  Obtain lab work on 6/13/2024.  Make adjustment to levothyroxine as indicated.  Repeat lab work weekly for close monitoring until more stable.  Discussed with patient TSH changes during hypothyroid phase of thyroiditis  Discussed prognosis of checkpoint inhibitor caused thyroiditis.  Patient is undergoing treatment for melanoma.  4.  Weight loss, abnormal  ACTH 14.8-35.3  Cortisol 7.4-7.3-7.3  Patient lost 15 pounds in 3-weeks.    Patient starting weight was 234 lbs.  Her current weight is 219 lbs.  She lost weight after she started having diarrhea.  Continue monitoring cortisol.  Currently on prednisone, therefore cortisol level will not be informative.  5. Malignant melanoma, unspecified site   Patient has

## 2024-06-13 DIAGNOSIS — E03.9 ACQUIRED HYPOTHYROIDISM: ICD-10-CM

## 2024-06-14 LAB
T3 SERPL-MCNC: 0.43 NG/ML (ref 0.8–2)
T3FREE SERPL-MCNC: 1.2 PG/ML (ref 2.3–4.2)
T4 FREE SERPL-MCNC: 0.5 NG/DL (ref 0.9–1.8)
T4 SERPL-MCNC: 3.1 UG/DL (ref 4.5–10.9)
TSH SERPL DL<=0.005 MIU/L-ACNC: 7.86 UIU/ML (ref 0.27–4.2)

## 2024-06-15 RX ORDER — LEVOTHYROXINE SODIUM 0.05 MG/1
50 TABLET ORAL DAILY
Qty: 30 TABLET | Refills: 1 | Status: SHIPPED | OUTPATIENT
Start: 2024-06-15

## 2024-06-26 DIAGNOSIS — E03.9 ACQUIRED HYPOTHYROIDISM: ICD-10-CM

## 2024-06-27 ENCOUNTER — PATIENT MESSAGE (OUTPATIENT)
Dept: ENDOCRINOLOGY | Age: 59
End: 2024-06-27

## 2024-06-27 DIAGNOSIS — E03.9 ACQUIRED HYPOTHYROIDISM: Primary | ICD-10-CM

## 2024-06-27 LAB
T3 SERPL-MCNC: 0.93 NG/ML (ref 0.8–2)
T3FREE SERPL-MCNC: 2.1 PG/ML (ref 2.3–4.2)
T4 FREE SERPL-MCNC: 0.8 NG/DL (ref 0.9–1.8)
T4 SERPL-MCNC: 5.3 UG/DL (ref 4.5–10.9)
TSH SERPL DL<=0.005 MIU/L-ACNC: 8.33 UIU/ML (ref 0.27–4.2)

## 2024-06-27 RX ORDER — LEVOTHYROXINE SODIUM 0.07 MG/1
75 TABLET ORAL DAILY
Qty: 30 TABLET | Refills: 1 | Status: SHIPPED | OUTPATIENT
Start: 2024-06-27

## 2024-06-27 NOTE — TELEPHONE ENCOUNTER
From: Valerie L Bleser  To: Dr. Varsha Saldana  Sent: 6/27/2024 7:33 AM EDT  Subject: Results    Good Morning   Just wanted you to know that I had my blood drawn yesterday for thyroid.   Also I'm on 10mg of steriods and do not need the NPH with this dosage, managing sugars very well with omnipod.  If you have any questions or need me to make any changes please let me know.    Thank you    Patito

## 2024-06-27 NOTE — TELEPHONE ENCOUNTER
I spoke with patient, she feels very tired.  Increase levothyroxine to 0.075 mg daily.  Advised patient to call if any palpitations, anxiety, insomnia.  Differential diagnosis includes improvement of T4 and T3 because levothyroxine dose was increased versus improving thyroiditis.  Lab work in 2 weeks.

## 2024-07-09 DIAGNOSIS — E03.9 ACQUIRED HYPOTHYROIDISM: ICD-10-CM

## 2024-07-10 ENCOUNTER — TELEPHONE (OUTPATIENT)
Dept: ENDOCRINOLOGY | Age: 59
End: 2024-07-10

## 2024-07-10 DIAGNOSIS — E03.9 ACQUIRED HYPOTHYROIDISM: Primary | ICD-10-CM

## 2024-07-10 DIAGNOSIS — E55.9 VITAMIN D DEFICIENCY: ICD-10-CM

## 2024-07-10 DIAGNOSIS — R63.4 WEIGHT LOSS: ICD-10-CM

## 2024-07-10 DIAGNOSIS — E10.40 TYPE 1 DIABETES, CONTROLLED, WITH NEUROPATHY (HCC): ICD-10-CM

## 2024-07-10 LAB
T3 SERPL-MCNC: 0.98 NG/ML (ref 0.8–2)
T3FREE SERPL-MCNC: 2.2 PG/ML (ref 2.3–4.2)
T4 FREE SERPL-MCNC: 1.1 NG/DL (ref 0.9–1.8)
T4 SERPL-MCNC: 6.4 UG/DL (ref 4.5–10.9)
TSH SERPL DL<=0.005 MIU/L-ACNC: 5.37 UIU/ML (ref 0.27–4.2)

## 2024-07-10 RX ORDER — LEVOTHYROXINE SODIUM 0.07 MG/1
75 TABLET ORAL DAILY
Qty: 30 TABLET | Refills: 1 | Status: SHIPPED | OUTPATIENT
Start: 2024-07-10

## 2024-07-11 NOTE — TELEPHONE ENCOUNTER
TSH 3.92, see  lab work in Care Everywhere.  Reviewed all lab results available.  Continue same levothyroxine 0.075 mg daily.  Lab work in 3 weeks.  Also ordered labs for September appointment.

## 2024-07-16 ENCOUNTER — PATIENT MESSAGE (OUTPATIENT)
Dept: ENDOCRINOLOGY | Age: 59
End: 2024-07-16

## 2024-07-16 NOTE — TELEPHONE ENCOUNTER
Per md: If your sugar is currently normal or high, inject 10 units. If its on the low end, just use pump for corrections. If it's still high afterwards, just use pump for corrections.

## 2024-07-16 NOTE — TELEPHONE ENCOUNTER
I did not take oral steriods.  Dr. Staton said it is equal to 50mg of my oral steriods, it's called solu-medrol 40mg  I didn't take NPH this morning, should I take now, I'm getting infusion in the next 30 min.  Sorry I missed your call I was in with Doctor,   I'm available for call now.

## 2024-07-16 NOTE — TELEPHONE ENCOUNTER
Good Morning Uzma  I am getting a steriod infusion today at 300pm.  I'm not sure when to take my NPH in this case.  Is there anyway you can check with Dr. Saldana and let me know by my chart or call.  I understand she can't call seeing patients.  I missed her call last night unfortunately.     Thank you very much     Patito

## 2024-07-17 NOTE — TELEPHONE ENCOUNTER
Spoke with patient, she did very well.  She was running blood glucose in 80s most of the time, after infusion went up to 200.  She has not received NPH insulin.  Continue monitoring the duration of hyperglycemia, will need another 3 infusions of steroid.

## 2024-07-25 DIAGNOSIS — E10.65 POORLY CONTROLLED TYPE 1 DIABETES MELLITUS WITH NEUROPATHY (HCC): ICD-10-CM

## 2024-07-25 DIAGNOSIS — E10.40 POORLY CONTROLLED TYPE 1 DIABETES MELLITUS WITH NEUROPATHY (HCC): ICD-10-CM

## 2024-07-25 RX ORDER — PROCHLORPERAZINE 25 MG/1
SUPPOSITORY RECTAL
Qty: 9 EACH | Refills: 1 | Status: SHIPPED | OUTPATIENT
Start: 2024-07-25

## 2024-08-01 ENCOUNTER — TELEPHONE (OUTPATIENT)
Dept: ENDOCRINOLOGY | Age: 59
End: 2024-08-01

## 2024-08-01 DIAGNOSIS — E03.9 ACQUIRED HYPOTHYROIDISM: ICD-10-CM

## 2024-08-01 LAB
T3 SERPL-MCNC: 1.03 NG/ML (ref 0.8–2)
T3FREE SERPL-MCNC: 2.5 PG/ML (ref 2.3–4.2)
T4 FREE SERPL-MCNC: 1.1 NG/DL (ref 0.9–1.8)
T4 SERPL-MCNC: 6.9 UG/DL (ref 4.5–10.9)
TSH SERPL DL<=0.005 MIU/L-ACNC: 3.48 UIU/ML (ref 0.27–4.2)

## 2024-08-02 NOTE — TELEPHONE ENCOUNTER
Please advise patient that thyroid function test is improving.  If she is very tired, there is still possibility to increase her thyroid medication.  However, if she has any palpitations, or insomnia, or anxiety or similar symptoms, I would recommend to continue same dose.  Let me know.

## 2024-08-28 RX ORDER — LEVOTHYROXINE SODIUM 75 UG/1
75 TABLET ORAL DAILY
Qty: 30 TABLET | Refills: 2 | Status: SHIPPED | OUTPATIENT
Start: 2024-08-28

## 2024-09-24 ENCOUNTER — OFFICE VISIT (OUTPATIENT)
Dept: ENDOCRINOLOGY | Age: 59
End: 2024-09-24
Payer: OTHER GOVERNMENT

## 2024-09-24 VITALS
HEART RATE: 68 BPM | OXYGEN SATURATION: 96 % | RESPIRATION RATE: 14 BRPM | BODY MASS INDEX: 39.84 KG/M2 | HEIGHT: 61 IN | TEMPERATURE: 98 F | WEIGHT: 211 LBS | SYSTOLIC BLOOD PRESSURE: 119 MMHG | DIASTOLIC BLOOD PRESSURE: 56 MMHG

## 2024-09-24 DIAGNOSIS — E55.9 VITAMIN D DEFICIENCY: ICD-10-CM

## 2024-09-24 DIAGNOSIS — I10 PRIMARY HYPERTENSION: ICD-10-CM

## 2024-09-24 DIAGNOSIS — E10.21 DIABETIC NEPHROPATHY ASSOCIATED WITH TYPE 1 DIABETES MELLITUS (HCC): ICD-10-CM

## 2024-09-24 DIAGNOSIS — E03.9 ACQUIRED HYPOTHYROIDISM: ICD-10-CM

## 2024-09-24 DIAGNOSIS — C43.9 MALIGNANT MELANOMA, UNSPECIFIED SITE (HCC): ICD-10-CM

## 2024-09-24 DIAGNOSIS — E10.40 TYPE 1 DIABETES, CONTROLLED, WITH NEUROPATHY (HCC): Primary | ICD-10-CM

## 2024-09-24 DIAGNOSIS — E10.40 TYPE 1 DIABETES, CONTROLLED, WITH NEUROPATHY (HCC): ICD-10-CM

## 2024-09-24 DIAGNOSIS — R63.4 WEIGHT LOSS: ICD-10-CM

## 2024-09-24 DIAGNOSIS — E06.3 HASHIMOTO'S THYROIDITIS: ICD-10-CM

## 2024-09-24 DIAGNOSIS — E04.2 MULTINODULAR GOITER: ICD-10-CM

## 2024-09-24 DIAGNOSIS — E78.2 MIXED HYPERLIPIDEMIA: ICD-10-CM

## 2024-09-24 DIAGNOSIS — Z86.39 HISTORY OF HYPERTHYROIDISM: ICD-10-CM

## 2024-09-24 DIAGNOSIS — E66.01 CLASS 2 SEVERE OBESITY WITH SERIOUS COMORBIDITY AND BODY MASS INDEX (BMI) OF 39.0 TO 39.9 IN ADULT, UNSPECIFIED OBESITY TYPE: ICD-10-CM

## 2024-09-24 LAB
25(OH)D3 SERPL-MCNC: 14 NG/ML
ALBUMIN SERPL-MCNC: 3.6 G/DL (ref 3.4–5)
ALBUMIN/GLOB SERPL: 1.7 {RATIO} (ref 1.1–2.2)
ALP SERPL-CCNC: 106 U/L (ref 40–129)
ALT SERPL-CCNC: 12 U/L (ref 10–40)
ANION GAP SERPL CALCULATED.3IONS-SCNC: 11 MMOL/L (ref 3–16)
ANTI-THYROGLOB ABS: 365 IU/ML
AST SERPL-CCNC: 16 U/L (ref 15–37)
BILIRUB SERPL-MCNC: 0.3 MG/DL (ref 0–1)
BUN SERPL-MCNC: 14 MG/DL (ref 7–20)
CALCIUM SERPL-MCNC: 9.1 MG/DL (ref 8.3–10.6)
CHLORIDE SERPL-SCNC: 98 MMOL/L (ref 99–110)
CHOLEST SERPL-MCNC: 178 MG/DL (ref 0–199)
CO2 SERPL-SCNC: 25 MMOL/L (ref 21–32)
CORTIS AM PEAK SERPL-MCNC: 11.4 UG/DL (ref 4.3–22.4)
CREAT SERPL-MCNC: 1.1 MG/DL (ref 0.6–1.1)
CREAT UR-MCNC: 162 MG/DL (ref 28–259)
EST. AVERAGE GLUCOSE BLD GHB EST-MCNC: 122.6 MG/DL
GFR SERPLBLD CREATININE-BSD FMLA CKD-EPI: 58 ML/MIN/{1.73_M2}
GLUCOSE SERPL-MCNC: 112 MG/DL (ref 70–99)
HBA1C MFR BLD: 5.9 %
HDLC SERPL-MCNC: 43 MG/DL (ref 40–60)
LDLC SERPL CALC-MCNC: 118 MG/DL
MICROALBUMIN UR DL<=1MG/L-MCNC: 2.03 MG/DL
MICROALBUMIN/CREAT UR: 12.5 MG/G (ref 0–30)
POTASSIUM SERPL-SCNC: 4 MMOL/L (ref 3.5–5.1)
PROT SERPL-MCNC: 5.7 G/DL (ref 6.4–8.2)
SODIUM SERPL-SCNC: 134 MMOL/L (ref 136–145)
T3 SERPL-MCNC: 1.09 NG/ML (ref 0.8–2)
T3FREE SERPL-MCNC: 2.6 PG/ML (ref 2.3–4.2)
T4 FREE SERPL-MCNC: 1.2 NG/DL (ref 0.9–1.8)
T4 SERPL-MCNC: 7.1 UG/DL (ref 4.5–10.9)
THYROPEROXIDASE AB SERPL IA-ACNC: 261 IU/ML
TRIGL SERPL-MCNC: 86 MG/DL (ref 0–150)
TSH SERPL DL<=0.005 MIU/L-ACNC: 0.87 UIU/ML (ref 0.27–4.2)
VLDLC SERPL CALC-MCNC: 17 MG/DL

## 2024-09-24 PROCEDURE — 95251 CONT GLUC MNTR ANALYSIS I&R: CPT | Performed by: INTERNAL MEDICINE

## 2024-09-24 PROCEDURE — 3078F DIAST BP <80 MM HG: CPT | Performed by: INTERNAL MEDICINE

## 2024-09-24 PROCEDURE — 99214 OFFICE O/P EST MOD 30 MIN: CPT | Performed by: INTERNAL MEDICINE

## 2024-09-24 PROCEDURE — 3074F SYST BP LT 130 MM HG: CPT | Performed by: INTERNAL MEDICINE

## 2024-09-24 RX ORDER — LEVOTHYROXINE SODIUM 75 UG/1
75 TABLET ORAL DAILY
Qty: 90 TABLET | Refills: 1 | Status: SHIPPED | OUTPATIENT
Start: 2024-09-24

## 2024-09-24 RX ORDER — PROCHLORPERAZINE 25 MG/1
SUPPOSITORY RECTAL
Qty: 9 EACH | Refills: 1 | Status: SHIPPED | OUTPATIENT
Start: 2024-09-24

## 2024-09-24 RX ORDER — INSULIN PMP CART,AUT,G6/7,CNTR
EACH SUBCUTANEOUS
Qty: 30 EACH | Refills: 1 | Status: SHIPPED | OUTPATIENT
Start: 2024-09-24

## 2024-09-24 RX ORDER — INSULIN LISPRO 100 [IU]/ML
INJECTION, SOLUTION INTRAVENOUS; SUBCUTANEOUS
Qty: 120 ML | Refills: 3 | Status: SHIPPED | OUTPATIENT
Start: 2024-09-24

## 2024-09-24 RX ORDER — PROCHLORPERAZINE 25 MG/1
SUPPOSITORY RECTAL
Qty: 1 EACH | Refills: 1 | Status: SHIPPED | OUTPATIENT
Start: 2024-09-24

## 2024-09-26 ENCOUNTER — TELEPHONE (OUTPATIENT)
Dept: ENDOCRINOLOGY | Age: 59
End: 2024-09-26

## 2024-09-26 RX ORDER — ERGOCALCIFEROL 1.25 MG/1
50000 CAPSULE ORAL WEEKLY
Qty: 12 CAPSULE | Refills: 1 | Status: SHIPPED | OUTPATIENT
Start: 2024-09-26

## 2024-09-28 LAB — ACTH PLAS-MCNC: 12 PG/ML (ref 7–63)

## 2024-10-11 DIAGNOSIS — Z92.89 HISTORY OF IMMUNOTHERAPY: ICD-10-CM

## 2024-10-11 DIAGNOSIS — E27.40 ADRENAL INSUFFICIENCY (HCC): ICD-10-CM

## 2024-10-11 LAB
ALBUMIN SERPL-MCNC: 3.5 G/DL (ref 3.4–5)
ALBUMIN/GLOB SERPL: 1.7 {RATIO} (ref 1.1–2.2)
ALP SERPL-CCNC: 109 U/L (ref 40–129)
ALT SERPL-CCNC: 7 U/L (ref 10–40)
ANION GAP SERPL CALCULATED.3IONS-SCNC: 8 MMOL/L (ref 3–16)
AST SERPL-CCNC: 7 U/L (ref 15–37)
BILIRUB SERPL-MCNC: <0.2 MG/DL (ref 0–1)
BUN SERPL-MCNC: 15 MG/DL (ref 7–20)
CALCIUM SERPL-MCNC: 9.3 MG/DL (ref 8.3–10.6)
CHLORIDE SERPL-SCNC: 104 MMOL/L (ref 99–110)
CO2 SERPL-SCNC: 30 MMOL/L (ref 21–32)
CORTIS AM PEAK SERPL-MCNC: 11.2 UG/DL (ref 4.3–22.4)
CREAT SERPL-MCNC: 0.8 MG/DL (ref 0.6–1.1)
ESTRADIOL SERPL-MCNC: <5 PG/ML
FSH SERPL-ACNC: 26.1 MIU/ML
GFR SERPLBLD CREATININE-BSD FMLA CKD-EPI: 85 ML/MIN/{1.73_M2}
GLUCOSE SERPL-MCNC: 134 MG/DL (ref 70–99)
LH SERPL-ACNC: 21.6 MIU/ML
POTASSIUM SERPL-SCNC: 4.9 MMOL/L (ref 3.5–5.1)
PROLACTIN SERPL IA-MCNC: 9.9 NG/ML
PROT SERPL-MCNC: 5.6 G/DL (ref 6.4–8.2)
SODIUM SERPL-SCNC: 142 MMOL/L (ref 136–145)

## 2024-10-14 LAB
ALDOST SERPL-MCNC: 6.6 NG/DL
IGF-I SERPL-MCNC: 109 NG/ML (ref 46–238)
IGF-I Z-SCORE SERPL: -0.1

## 2024-10-15 LAB
ACTH PLAS-MCNC: 11 PG/ML (ref 7–63)
DHEA-S SERPL-MCNC: 8 UG/DL (ref 18.9–205)
RENIN PLAS-CCNC: 0.3 NG/ML/HR

## 2024-10-31 ENCOUNTER — OFFICE VISIT (OUTPATIENT)
Dept: ENDOCRINOLOGY | Age: 59
End: 2024-10-31

## 2024-10-31 VITALS
TEMPERATURE: 98 F | OXYGEN SATURATION: 98 % | BODY MASS INDEX: 40.22 KG/M2 | SYSTOLIC BLOOD PRESSURE: 144 MMHG | WEIGHT: 213 LBS | RESPIRATION RATE: 14 BRPM | HEART RATE: 77 BPM | HEIGHT: 61 IN | DIASTOLIC BLOOD PRESSURE: 60 MMHG

## 2024-10-31 DIAGNOSIS — E55.9 VITAMIN D DEFICIENCY: ICD-10-CM

## 2024-10-31 DIAGNOSIS — E04.2 MULTINODULAR GOITER: ICD-10-CM

## 2024-10-31 DIAGNOSIS — E06.3 HASHIMOTO'S THYROIDITIS: ICD-10-CM

## 2024-10-31 DIAGNOSIS — C43.9 MALIGNANT MELANOMA, UNSPECIFIED SITE (HCC): ICD-10-CM

## 2024-10-31 DIAGNOSIS — E10.21 DIABETIC NEPHROPATHY ASSOCIATED WITH TYPE 1 DIABETES MELLITUS (HCC): ICD-10-CM

## 2024-10-31 DIAGNOSIS — Z79.52 CURRENT CHRONIC USE OF SYSTEMIC STEROIDS: ICD-10-CM

## 2024-10-31 DIAGNOSIS — Z86.39 HISTORY OF HYPERTHYROIDISM: ICD-10-CM

## 2024-10-31 DIAGNOSIS — E66.01 CLASS 3 SEVERE OBESITY WITH SERIOUS COMORBIDITY AND BODY MASS INDEX (BMI) OF 40.0 TO 44.9 IN ADULT, UNSPECIFIED OBESITY TYPE: ICD-10-CM

## 2024-10-31 DIAGNOSIS — E10.40 TYPE 1 DIABETES, CONTROLLED, WITH NEUROPATHY (HCC): Primary | ICD-10-CM

## 2024-10-31 DIAGNOSIS — E03.9 ACQUIRED HYPOTHYROIDISM: ICD-10-CM

## 2024-10-31 DIAGNOSIS — E27.40 ADRENAL INSUFFICIENCY (HCC): ICD-10-CM

## 2024-10-31 DIAGNOSIS — E78.2 MIXED HYPERLIPIDEMIA: ICD-10-CM

## 2024-10-31 DIAGNOSIS — E66.813 CLASS 3 SEVERE OBESITY WITH SERIOUS COMORBIDITY AND BODY MASS INDEX (BMI) OF 40.0 TO 44.9 IN ADULT, UNSPECIFIED OBESITY TYPE: ICD-10-CM

## 2024-10-31 DIAGNOSIS — I10 PRIMARY HYPERTENSION: ICD-10-CM

## 2024-10-31 DIAGNOSIS — Z92.89 HISTORY OF IMMUNOTHERAPY: ICD-10-CM

## 2024-10-31 DIAGNOSIS — Z78.0 MENOPAUSE: ICD-10-CM

## 2024-10-31 PROBLEM — E66.812 CLASS 2 SEVERE OBESITY WITH SERIOUS COMORBIDITY AND BODY MASS INDEX (BMI) OF 39.0 TO 39.9 IN ADULT: Status: ACTIVE | Noted: 2024-10-31

## 2024-10-31 RX ORDER — HYDROCORTISONE 10 MG/1
TABLET ORAL
Qty: 70 TABLET | Refills: 3 | Status: SHIPPED | OUTPATIENT
Start: 2024-10-31

## 2024-10-31 RX ORDER — GLUCAGON 3 MG/1
POWDER NASAL
Qty: 2 EACH | Refills: 3 | Status: SHIPPED | OUTPATIENT
Start: 2024-10-31

## 2024-10-31 RX ORDER — INSULIN PMP CART,AUT,G6/7,CNTR
EACH SUBCUTANEOUS
Qty: 30 EACH | Refills: 1 | Status: SHIPPED | OUTPATIENT
Start: 2024-10-31

## 2024-10-31 NOTE — PROGRESS NOTES
patient No Valerie L Bleser was counseled regarding symptoms of diabetes, hyperlipidemia, hypertension, multinodular goiter, hyperthyroidism, adrenal insufficiency diagnosis, course and complications of disease if inadequately treated, side effects of medications, diagnosis, treatment options, and prognosis, risks, benefits, complications, and alternatives of treatment, labs, imaging and other studies and treatment targets and goals, immunotherapy endocrine related side effects..  She understands instructions and counseling.    These diagnosis were discussed and reviewed with the patient including the advantages of drug therapy. She was counseled at this visit on the following: diabetes complication prevention and foot care.    CGMS Download Review and Recommendations  See scanned document for blood glucose tracing documentation  Dexcom personal CGMS data downloaded and reviewed.  This was separate service provided/CGM interpretation    Average glucose 173± 47 SD  Time in range: 60 %  Time above 180: 40 %  Time under 70: 0%   GMI 7.4    Basal pattern review: Basal normoglycemia and hyperglycemia  Postprandial pattern review: Postprandial hyperglycemia and normoglycemia  Hypoglycemia review: No hypoglycemia   Activity related review: Not recorded       Based on the data, I recommend:    1.  Healthy food choices, portion control    2.  Do not underestimate carbohydrates    3.  Hypoglycemia management    4.  Timely boluses    5.  Stress management.    Total time I spent for this encounter 45 minutes, excluding CGM interpretation time      Return in about 7 weeks (around 12/17/2024) for diabetes, thyroid problems, adrenal disease.    Electronically signed by Varsha Saldana MD on 10/31/2024 at 9:17 PM

## 2024-11-04 ENCOUNTER — TELEPHONE (OUTPATIENT)
Dept: ENDOCRINOLOGY | Age: 59
End: 2024-11-04

## 2024-11-04 DIAGNOSIS — E27.40 ADRENAL INSUFFICIENCY (HCC): ICD-10-CM

## 2024-11-06 NOTE — TELEPHONE ENCOUNTER
There was a PA received for Solu-Cortef, but there is not a current RX on file.    If you want PA please submit new RX, and resend this PA request back to the pool    If this requires a response please respond to the pool ( P [facility] MEDICATION PRE-AUTH).      Thank you please advise patient.

## 2024-11-14 ENCOUNTER — TELEPHONE (OUTPATIENT)
Dept: ENDOCRINOLOGY | Age: 59
End: 2024-11-14

## 2024-11-14 NOTE — TELEPHONE ENCOUNTER
Rosa message from patient:    Dr Saldana      I'm struggling with my Cancer meds again, do I need to double hydrocortisone for this?  I'm not sick but I feel awful???  Headache  Chills  Nausea   Sore body  All med symptoms   No Covid I tested      What would you like me to do in this case?     Thanks     Patito

## 2024-12-05 ENCOUNTER — TELEPHONE (OUTPATIENT)
Dept: ENDOCRINOLOGY | Age: 59
End: 2024-12-05

## 2024-12-10 ENCOUNTER — HOSPITAL ENCOUNTER (OUTPATIENT)
Dept: ULTRASOUND IMAGING | Age: 59
Discharge: HOME | End: 2024-12-10
Attending: INTERNAL MEDICINE
Payer: OTHER GOVERNMENT

## 2024-12-10 DIAGNOSIS — E04.2 MULTINODULAR GOITER: ICD-10-CM

## 2024-12-10 DIAGNOSIS — E78.2 MIXED HYPERLIPIDEMIA: ICD-10-CM

## 2024-12-10 DIAGNOSIS — E27.40 ADRENAL INSUFFICIENCY (HCC): ICD-10-CM

## 2024-12-10 DIAGNOSIS — E03.9 ACQUIRED HYPOTHYROIDISM: ICD-10-CM

## 2024-12-10 DIAGNOSIS — E10.40 TYPE 1 DIABETES, CONTROLLED, WITH NEUROPATHY (HCC): ICD-10-CM

## 2024-12-10 DIAGNOSIS — E55.9 VITAMIN D DEFICIENCY: ICD-10-CM

## 2024-12-10 PROCEDURE — 76536 US EXAM OF HEAD AND NECK: CPT

## 2024-12-11 LAB
25(OH)D3 SERPL-MCNC: 26.1 NG/ML
ALBUMIN SERPL-MCNC: 4 G/DL (ref 3.4–5)
ALBUMIN/GLOB SERPL: 1.7 {RATIO} (ref 1.1–2.2)
ALP SERPL-CCNC: 91 U/L (ref 40–129)
ALT SERPL-CCNC: 11 U/L (ref 10–40)
ANION GAP SERPL CALCULATED.3IONS-SCNC: 10 MMOL/L (ref 3–16)
AST SERPL-CCNC: 15 U/L (ref 15–37)
BILIRUB SERPL-MCNC: <0.2 MG/DL (ref 0–1)
BUN SERPL-MCNC: 20 MG/DL (ref 7–20)
CALCIUM SERPL-MCNC: 9.6 MG/DL (ref 8.3–10.6)
CHLORIDE SERPL-SCNC: 100 MMOL/L (ref 99–110)
CHOLEST SERPL-MCNC: 206 MG/DL (ref 0–199)
CO2 SERPL-SCNC: 29 MMOL/L (ref 21–32)
CORTIS AM PEAK SERPL-MCNC: 28.1 UG/DL (ref 4.3–22.4)
CREAT SERPL-MCNC: 1.1 MG/DL (ref 0.6–1.1)
EST. AVERAGE GLUCOSE BLD GHB EST-MCNC: 128.4 MG/DL
GFR SERPLBLD CREATININE-BSD FMLA CKD-EPI: 58 ML/MIN/{1.73_M2}
GLUCOSE SERPL-MCNC: 100 MG/DL (ref 70–99)
HBA1C MFR BLD: 6.1 %
HDLC SERPL-MCNC: 68 MG/DL (ref 40–60)
LDLC SERPL CALC-MCNC: 118 MG/DL
POTASSIUM SERPL-SCNC: 4.5 MMOL/L (ref 3.5–5.1)
PROT SERPL-MCNC: 6.3 G/DL (ref 6.4–8.2)
SODIUM SERPL-SCNC: 139 MMOL/L (ref 136–145)
T3FREE SERPL-MCNC: 3.3 PG/ML (ref 2.3–4.2)
T4 FREE SERPL-MCNC: 1.1 NG/DL (ref 0.9–1.8)
TRIGL SERPL-MCNC: 102 MG/DL (ref 0–150)
TSH SERPL DL<=0.005 MIU/L-ACNC: 2.79 UIU/ML (ref 0.27–4.2)
VLDLC SERPL CALC-MCNC: 20 MG/DL

## 2024-12-12 LAB
ALDOST SERPL-MCNC: 7.6 NG/DL
DHEA-S SERPL-MCNC: 17 UG/DL (ref 18.9–205)

## 2024-12-13 LAB
ACTH PLAS-MCNC: 12 PG/ML (ref 7–63)
RENIN PLAS-CCNC: 0.7 NG/ML/HR

## 2024-12-17 ENCOUNTER — OFFICE VISIT (OUTPATIENT)
Dept: ENDOCRINOLOGY | Age: 59
End: 2024-12-17
Payer: OTHER GOVERNMENT

## 2024-12-17 VITALS
OXYGEN SATURATION: 96 % | HEART RATE: 69 BPM | TEMPERATURE: 98 F | SYSTOLIC BLOOD PRESSURE: 174 MMHG | DIASTOLIC BLOOD PRESSURE: 76 MMHG | BODY MASS INDEX: 40.59 KG/M2 | RESPIRATION RATE: 14 BRPM | WEIGHT: 215 LBS | HEIGHT: 61 IN

## 2024-12-17 DIAGNOSIS — Z78.0 MENOPAUSE: ICD-10-CM

## 2024-12-17 DIAGNOSIS — E66.01 CLASS 3 SEVERE OBESITY WITH SERIOUS COMORBIDITY AND BODY MASS INDEX (BMI) OF 40.0 TO 44.9 IN ADULT, UNSPECIFIED OBESITY TYPE: ICD-10-CM

## 2024-12-17 DIAGNOSIS — E66.813 CLASS 3 SEVERE OBESITY WITH SERIOUS COMORBIDITY AND BODY MASS INDEX (BMI) OF 40.0 TO 44.9 IN ADULT, UNSPECIFIED OBESITY TYPE: ICD-10-CM

## 2024-12-17 DIAGNOSIS — E10.21 DIABETIC NEPHROPATHY ASSOCIATED WITH TYPE 1 DIABETES MELLITUS (HCC): ICD-10-CM

## 2024-12-17 DIAGNOSIS — C43.9 MALIGNANT MELANOMA, UNSPECIFIED SITE (HCC): ICD-10-CM

## 2024-12-17 DIAGNOSIS — E55.9 VITAMIN D DEFICIENCY: ICD-10-CM

## 2024-12-17 DIAGNOSIS — E04.2 MULTINODULAR GOITER: ICD-10-CM

## 2024-12-17 DIAGNOSIS — I10 PRIMARY HYPERTENSION: ICD-10-CM

## 2024-12-17 DIAGNOSIS — Z92.89 HISTORY OF IMMUNOTHERAPY: ICD-10-CM

## 2024-12-17 DIAGNOSIS — E10.40 TYPE 1 DIABETES, CONTROLLED, WITH NEUROPATHY (HCC): Primary | ICD-10-CM

## 2024-12-17 DIAGNOSIS — Z86.39 HISTORY OF HYPERTHYROIDISM: ICD-10-CM

## 2024-12-17 DIAGNOSIS — E03.9 ACQUIRED HYPOTHYROIDISM: ICD-10-CM

## 2024-12-17 DIAGNOSIS — E06.3 HASHIMOTO'S THYROIDITIS: ICD-10-CM

## 2024-12-17 DIAGNOSIS — E27.40 ADRENAL INSUFFICIENCY (HCC): ICD-10-CM

## 2024-12-17 DIAGNOSIS — Z79.52 CURRENT CHRONIC USE OF SYSTEMIC STEROIDS: ICD-10-CM

## 2024-12-17 DIAGNOSIS — E78.2 MIXED HYPERLIPIDEMIA: ICD-10-CM

## 2024-12-17 PROCEDURE — 3044F HG A1C LEVEL LT 7.0%: CPT | Performed by: INTERNAL MEDICINE

## 2024-12-17 PROCEDURE — 3078F DIAST BP <80 MM HG: CPT | Performed by: INTERNAL MEDICINE

## 2024-12-17 PROCEDURE — 3077F SYST BP >= 140 MM HG: CPT | Performed by: INTERNAL MEDICINE

## 2024-12-17 PROCEDURE — 95251 CONT GLUC MNTR ANALYSIS I&R: CPT | Performed by: INTERNAL MEDICINE

## 2024-12-17 PROCEDURE — 99214 OFFICE O/P EST MOD 30 MIN: CPT | Performed by: INTERNAL MEDICINE

## 2024-12-17 RX ORDER — HYDROCORTISONE SODIUM SUCCINATE 100 MG/2ML
INJECTION INTRAMUSCULAR; INTRAVENOUS
Qty: 2 ML | Refills: 3 | Status: SHIPPED | OUTPATIENT
Start: 2024-12-17

## 2024-12-17 RX ORDER — INSULIN PMP CART,AUT,G6/7,CNTR
EACH SUBCUTANEOUS
Qty: 45 EACH | Refills: 1 | Status: SHIPPED | OUTPATIENT
Start: 2024-12-17

## 2024-12-17 RX ORDER — BUDESONIDE 9 MG/1
9 TABLET, FILM COATED, EXTENDED RELEASE ORAL DAILY
COMMUNITY
Start: 2024-12-04

## 2024-12-17 NOTE — PROGRESS NOTES
Transmitter (DEXCOM G6 TRANSMITTER) MISC Change transmitter every 3 months 1 each 1    Continuous Glucose Sensor (DEXCOM G6 SENSOR) MISC Change every 10 days 9 each 1    BRAFTOVI 75 MG CAPS Take 6 capsules by mouth daily      MEKTOVI 15 MG TABS Take 45 mg by mouth in the morning and at bedtime      metoprolol succinate (TOPROL XL) 50 MG extended release tablet Take 1 tablet by mouth in the morning and at bedtime 60 tablet 0    celecoxib (CELEBREX) 200 MG capsule Take 1 capsule by mouth daily      coenzyme Q10 200 MG CAPS capsule Take 1 capsule by mouth daily      Continuous Blood Gluc  (DEXCOM G6 ) PHILLIP Use to check blood sugar as indicated by md 1 each 0    Insulin Disposable Pump (OMNIPOD 5 G6 INTRO, GEN 5,) KIT USE AS DIRECTED BY MD TO CONTINUOUSLY INFUSE INSULIN 1 kit 0    hydroCHLOROthiazide (MICROZIDE) 12.5 MG capsule Take 1 capsule by mouth daily      diazePAM (VALIUM) 5 MG tablet Take 1 tablet by mouth nightly as needed.      benazepril (LOTENSIN) 40 MG tablet Take 1 tablet by mouth daily 30 tablet 3    acetaminophen (TYLENOL) 500 MG tablet Take 1 tablet by mouth every 6 hours as needed for Pain      rosuvastatin (CRESTOR) 20 MG tablet Take 1 tablet by mouth nightly 90 tablet 3    Insulin Syringe-Needle U-100 (BD INSULIN SYRINGE U/F) 30G X 1/2\" 0.5 ML MISC USE TO INJECT UNDER THE SKIN SIX TIMES DAILY 540 each 3    blood glucose monitor strips Test 7-8 times a day & as needed for symptoms of irregular blood glucose. Dispense sufficient amount for indicated testing frequency plus additional to accommodate PRN testing needs. 800 strip 3    Blood Glucose Monitoring Suppl (FREESTYLE FREEDOM LITE) w/Device KIT 1 kit by Does not apply route 4 times daily (before meals and nightly) 1 kit 0    FreeStyle Lancets MISC 1 each by Does not apply route 8 times daily 300 each 5    Handicap Placard MISC by Does not apply route Dx of bilateral knee osteoarthritis.  Effective Feb 25, 2019 until Feb 25, 2025. 1

## 2024-12-30 RX ORDER — LEVOTHYROXINE SODIUM 75 UG/1
75 TABLET ORAL DAILY
Qty: 90 TABLET | Refills: 1 | Status: SHIPPED | OUTPATIENT
Start: 2024-12-30

## 2025-01-04 DIAGNOSIS — E10.40 TYPE 1 DIABETES, CONTROLLED, WITH NEUROPATHY (HCC): ICD-10-CM

## 2025-01-04 DIAGNOSIS — E78.2 MIXED HYPERLIPIDEMIA: ICD-10-CM

## 2025-01-04 DIAGNOSIS — I10 PRIMARY HYPERTENSION: ICD-10-CM

## 2025-01-04 DIAGNOSIS — E05.90 HYPERTHYROIDISM: ICD-10-CM

## 2025-01-04 DIAGNOSIS — R63.4 WEIGHT LOSS: ICD-10-CM

## 2025-01-04 DIAGNOSIS — E55.9 VITAMIN D DEFICIENCY: ICD-10-CM

## 2025-01-04 DIAGNOSIS — E06.3 HASHIMOTO'S THYROIDITIS: ICD-10-CM

## 2025-01-07 RX ORDER — METOPROLOL SUCCINATE 50 MG/1
TABLET, EXTENDED RELEASE ORAL
Qty: 90 TABLET | Refills: 1 | Status: SHIPPED | OUTPATIENT
Start: 2025-01-07

## 2025-01-29 ENCOUNTER — OFFICE VISIT (OUTPATIENT)
Dept: ORTHOPEDIC SURGERY | Age: 60
End: 2025-01-29

## 2025-01-29 VITALS — HEIGHT: 61 IN | WEIGHT: 215 LBS | BODY MASS INDEX: 40.59 KG/M2

## 2025-01-29 DIAGNOSIS — M75.22 BICEPS TENDINITIS OF LEFT SHOULDER: Primary | ICD-10-CM

## 2025-01-29 DIAGNOSIS — M25.512 LEFT SHOULDER PAIN, UNSPECIFIED CHRONICITY: ICD-10-CM

## 2025-01-29 RX ORDER — METHYLPREDNISOLONE ACETATE 40 MG/ML
80 INJECTION, SUSPENSION INTRA-ARTICULAR; INTRALESIONAL; INTRAMUSCULAR; SOFT TISSUE ONCE
Status: COMPLETED | OUTPATIENT
Start: 2025-01-29 | End: 2025-01-29

## 2025-01-29 RX ORDER — LIDOCAINE HYDROCHLORIDE 10 MG/ML
8 INJECTION, SOLUTION INFILTRATION; PERINEURAL ONCE
Status: COMPLETED | OUTPATIENT
Start: 2025-01-29 | End: 2025-01-29

## 2025-01-29 RX ADMIN — LIDOCAINE HYDROCHLORIDE 8 ML: 10 INJECTION, SOLUTION INFILTRATION; PERINEURAL at 18:18

## 2025-01-29 RX ADMIN — METHYLPREDNISOLONE ACETATE 80 MG: 40 INJECTION, SUSPENSION INTRA-ARTICULAR; INTRALESIONAL; INTRAMUSCULAR; SOFT TISSUE at 18:19

## 2025-01-31 NOTE — PROGRESS NOTES
therapy at our [place] office. A new physical therapy letter was documented in EPIC today.     After discussing the risks and benefits of a corticosteroid injection, Patito did state an understanding and gave verbal consent to proceed.  After cleansing the injection site with Chlora-prep and using aseptic techniques,  2 CC of Depo Medrol 40mg/ml and 8 CC of 1% lidocaine were injected in the left glenohumeral space. She  tolerated the procedure well with no immediate adverse sequelae after the injection.  A bandage was placed over the injection site. Appropriate post injections instructions were given to the patient.    We will see Patito back in 4 weeks and/or as needed. All questions were answered to patient's satisfaction and She was encouraged to call with any further questions or concerns. Valerie Bleser is in agreement with this plan.    1/31/2025  3:08 PM    Grayson Treadwell ATC    [facility]    During this examination, Grayson MANN ATC, functioned as a scribe for Dr. Maria Esther Cook. The history taking and physical examination were performed by Dr. Cook. All counseling during the appointment was performed between the patient and Dr. Cook. 1/31/25  ______________  I, Dr. Maria Esther Cook, personally performed the services described in this documentation as described by Grayson Treadwell ATC in my presence, and it is both accurate and complete.    Maria Esther Cook MD, PhD  1/29/2025

## 2025-02-19 ENCOUNTER — PATIENT MESSAGE (OUTPATIENT)
Dept: ENDOCRINOLOGY | Age: 60
End: 2025-02-19

## 2025-02-20 NOTE — TELEPHONE ENCOUNTER
Reviewed.  Also reviewed cortisol result 20.3 done on 2/19/2025.  Patient needs stress dose of hydrocortisone during periods of sickness.

## 2025-02-20 NOTE — TELEPHONE ENCOUNTER
I spoke with patient.  Patient stated that her most recent cortisol test was good.  However, she did test after taking hydrocortisone tablet.  Results were not available on epic, therefore was not able to review them.  Patient will discuss with oncologist regarding steroid use during radiation therapy.  Then she will notify me.

## 2025-03-05 DIAGNOSIS — R79.89 ELEVATED LIVER FUNCTION TESTS: ICD-10-CM

## 2025-03-05 DIAGNOSIS — E78.2 MIXED HYPERLIPIDEMIA: ICD-10-CM

## 2025-03-05 DIAGNOSIS — I10 PRIMARY HYPERTENSION: ICD-10-CM

## 2025-03-05 DIAGNOSIS — E10.21 DIABETIC NEPHROPATHY ASSOCIATED WITH TYPE 1 DIABETES MELLITUS (HCC): ICD-10-CM

## 2025-03-05 DIAGNOSIS — E06.3 HASHIMOTO'S THYROIDITIS: ICD-10-CM

## 2025-03-05 DIAGNOSIS — E55.9 VITAMIN D DEFICIENCY: ICD-10-CM

## 2025-03-05 DIAGNOSIS — E10.40 TYPE 1 DIABETES, CONTROLLED, WITH NEUROPATHY (HCC): ICD-10-CM

## 2025-03-05 DIAGNOSIS — R63.4 WEIGHT LOSS: ICD-10-CM

## 2025-03-05 DIAGNOSIS — E04.2 MULTINODULAR GOITER: ICD-10-CM

## 2025-03-05 DIAGNOSIS — E83.52 HYPERCALCEMIA: ICD-10-CM

## 2025-03-05 DIAGNOSIS — E03.9 ACQUIRED HYPOTHYROIDISM: ICD-10-CM

## 2025-03-05 DIAGNOSIS — E27.40 ADRENAL INSUFFICIENCY: ICD-10-CM

## 2025-03-05 DIAGNOSIS — E05.90 HYPERTHYROIDISM: ICD-10-CM

## 2025-03-05 LAB
25(OH)D3 SERPL-MCNC: 41.7 NG/ML
ALBUMIN SERPL-MCNC: 4.1 G/DL (ref 3.4–5)
ALBUMIN/GLOB SERPL: 1.6 {RATIO} (ref 1.1–2.2)
ALP SERPL-CCNC: 74 U/L (ref 40–129)
ALT SERPL-CCNC: 18 U/L (ref 10–40)
ANION GAP SERPL CALCULATED.3IONS-SCNC: 12 MMOL/L (ref 3–16)
ANTI-THYROGLOB ABS: 553 IU/ML
AST SERPL-CCNC: 21 U/L (ref 15–37)
BASOPHILS # BLD: 0 K/UL (ref 0–0.2)
BASOPHILS NFR BLD: 0.6 %
BILIRUB SERPL-MCNC: 0.4 MG/DL (ref 0–1)
BUN SERPL-MCNC: 10 MG/DL (ref 7–20)
CALCIUM SERPL-MCNC: 9.8 MG/DL (ref 8.3–10.6)
CHLORIDE SERPL-SCNC: 99 MMOL/L (ref 99–110)
CHOLEST SERPL-MCNC: 182 MG/DL (ref 0–199)
CO2 SERPL-SCNC: 27 MMOL/L (ref 21–32)
CORTIS AM PEAK SERPL-MCNC: 10.7 UG/DL (ref 4.3–22.4)
CREAT SERPL-MCNC: 0.8 MG/DL (ref 0.6–1.1)
CREAT UR-MCNC: 90.4 MG/DL (ref 28–259)
DEPRECATED RDW RBC AUTO: 15.5 % (ref 12.4–15.4)
EOSINOPHIL # BLD: 0.1 K/UL (ref 0–0.6)
EOSINOPHIL NFR BLD: 3 %
EST. AVERAGE GLUCOSE BLD GHB EST-MCNC: 142.7 MG/DL
GFR SERPLBLD CREATININE-BSD FMLA CKD-EPI: 85 ML/MIN/{1.73_M2}
GLUCOSE SERPL-MCNC: 158 MG/DL (ref 70–99)
HBA1C MFR BLD: 6.6 %
HCT VFR BLD AUTO: 36.3 % (ref 36–48)
HDLC SERPL-MCNC: 43 MG/DL (ref 40–60)
HGB BLD-MCNC: 12.5 G/DL (ref 12–16)
LDL CHOLESTEROL: 104 MG/DL
LYMPHOCYTES # BLD: 1.5 K/UL (ref 1–5.1)
LYMPHOCYTES NFR BLD: 48.6 %
MAGNESIUM SERPL-MCNC: 1.81 MG/DL (ref 1.8–2.4)
MCH RBC QN AUTO: 29.7 PG (ref 26–34)
MCHC RBC AUTO-ENTMCNC: 34.4 G/DL (ref 31–36)
MCV RBC AUTO: 86.3 FL (ref 80–100)
MICROALBUMIN UR DL<=1MG/L-MCNC: <1.2 MG/DL
MICROALBUMIN/CREAT UR: NORMAL MG/G (ref 0–30)
MONOCYTES # BLD: 0.3 K/UL (ref 0–1.3)
MONOCYTES NFR BLD: 10 %
NEUTROPHILS # BLD: 1.1 K/UL (ref 1.7–7.7)
NEUTROPHILS NFR BLD: 37.8 %
PHOSPHATE SERPL-MCNC: 3.3 MG/DL (ref 2.5–4.9)
PLATELET # BLD AUTO: 128 K/UL (ref 135–450)
PMV BLD AUTO: 10.5 FL (ref 5–10.5)
POTASSIUM SERPL-SCNC: 3.8 MMOL/L (ref 3.5–5.1)
PROT SERPL-MCNC: 6.7 G/DL (ref 6.4–8.2)
PTH-INTACT SERPL-MCNC: 40.1 PG/ML (ref 14–72)
RBC # BLD AUTO: 4.2 M/UL (ref 4–5.2)
SODIUM SERPL-SCNC: 138 MMOL/L (ref 136–145)
T3 SERPL-MCNC: 1.29 NG/ML (ref 0.8–2)
T3FREE SERPL-MCNC: 3.9 PG/ML (ref 2.3–4.2)
T4 FREE SERPL-MCNC: 1.7 NG/DL (ref 0.9–1.8)
T4 SERPL-MCNC: 10 UG/DL (ref 4.5–10.9)
THYROPEROXIDASE AB SERPL IA-ACNC: 191 IU/ML
TRIGL SERPL-MCNC: 176 MG/DL (ref 0–150)
TSH SERPL DL<=0.005 MIU/L-ACNC: 0.51 UIU/ML (ref 0.27–4.2)
VLDLC SERPL CALC-MCNC: 35 MG/DL
WBC # BLD AUTO: 3 K/UL (ref 4–11)

## 2025-03-07 LAB
ACTH PLAS-MCNC: 38 PG/ML (ref 7–63)
CA-I ADJ PH7.4 SERPL-SCNC: 1.3 MMOL/L (ref 1.09–1.3)
CA-I SERPL ISE-SCNC: 1.19 MMOL/L (ref 1.09–1.3)
TSH RECEP AB SER-ACNC: <1.1 IU/L
TSI SER-ACNC: <0.1 IU/L

## 2025-03-13 DIAGNOSIS — E10.40 TYPE 1 DIABETES, CONTROLLED, WITH NEUROPATHY (HCC): ICD-10-CM

## 2025-03-13 DIAGNOSIS — E78.2 MIXED HYPERLIPIDEMIA: ICD-10-CM

## 2025-03-13 DIAGNOSIS — E06.3 HASHIMOTO'S THYROIDITIS: ICD-10-CM

## 2025-03-13 DIAGNOSIS — R63.4 WEIGHT LOSS: ICD-10-CM

## 2025-03-13 DIAGNOSIS — I10 PRIMARY HYPERTENSION: ICD-10-CM

## 2025-03-13 DIAGNOSIS — E05.90 HYPERTHYROIDISM: ICD-10-CM

## 2025-03-13 DIAGNOSIS — E55.9 VITAMIN D DEFICIENCY: ICD-10-CM

## 2025-03-13 RX ORDER — PROCHLORPERAZINE 25 MG/1
SUPPOSITORY RECTAL
Qty: 9 EACH | Refills: 0 | Status: SHIPPED | OUTPATIENT
Start: 2025-03-13

## 2025-03-13 RX ORDER — PROCHLORPERAZINE 25 MG/1
SUPPOSITORY RECTAL
Qty: 1 EACH | Refills: 0 | Status: SHIPPED | OUTPATIENT
Start: 2025-03-13

## 2025-03-13 RX ORDER — METOPROLOL SUCCINATE 50 MG/1
TABLET, EXTENDED RELEASE ORAL
Qty: 270 TABLET | Refills: 0 | Status: SHIPPED | OUTPATIENT
Start: 2025-03-13 | End: 2025-03-18

## 2025-03-15 ENCOUNTER — RESULTS FOLLOW-UP (OUTPATIENT)
Dept: ENDOCRINOLOGY | Age: 60
End: 2025-03-15

## 2025-03-17 ENCOUNTER — OFFICE VISIT (OUTPATIENT)
Dept: ENDOCRINOLOGY | Age: 60
End: 2025-03-17
Payer: OTHER GOVERNMENT

## 2025-03-17 VITALS
DIASTOLIC BLOOD PRESSURE: 61 MMHG | HEIGHT: 61 IN | WEIGHT: 209 LBS | RESPIRATION RATE: 14 BRPM | OXYGEN SATURATION: 98 % | SYSTOLIC BLOOD PRESSURE: 144 MMHG | HEART RATE: 63 BPM | BODY MASS INDEX: 39.46 KG/M2 | TEMPERATURE: 98 F

## 2025-03-17 DIAGNOSIS — Z92.89 HISTORY OF IMMUNOTHERAPY: ICD-10-CM

## 2025-03-17 DIAGNOSIS — C43.9 MALIGNANT MELANOMA, UNSPECIFIED SITE (HCC): ICD-10-CM

## 2025-03-17 DIAGNOSIS — E10.21 DIABETIC NEPHROPATHY ASSOCIATED WITH TYPE 1 DIABETES MELLITUS: ICD-10-CM

## 2025-03-17 DIAGNOSIS — E66.812 CLASS 2 SEVERE OBESITY WITH SERIOUS COMORBIDITY AND BODY MASS INDEX (BMI) OF 39.0 TO 39.9 IN ADULT, UNSPECIFIED OBESITY TYPE: ICD-10-CM

## 2025-03-17 DIAGNOSIS — I10 PRIMARY HYPERTENSION: ICD-10-CM

## 2025-03-17 DIAGNOSIS — Z78.0 MENOPAUSE: ICD-10-CM

## 2025-03-17 DIAGNOSIS — E10.40 TYPE 1 DIABETES, CONTROLLED, WITH NEUROPATHY (HCC): Primary | ICD-10-CM

## 2025-03-17 DIAGNOSIS — E04.2 MULTINODULAR GOITER: ICD-10-CM

## 2025-03-17 DIAGNOSIS — E03.9 ACQUIRED HYPOTHYROIDISM: ICD-10-CM

## 2025-03-17 DIAGNOSIS — E06.3 HASHIMOTO'S THYROIDITIS: ICD-10-CM

## 2025-03-17 DIAGNOSIS — E66.01 CLASS 2 SEVERE OBESITY WITH SERIOUS COMORBIDITY AND BODY MASS INDEX (BMI) OF 39.0 TO 39.9 IN ADULT, UNSPECIFIED OBESITY TYPE: ICD-10-CM

## 2025-03-17 DIAGNOSIS — Z86.39 HISTORY OF HYPERTHYROIDISM: ICD-10-CM

## 2025-03-17 DIAGNOSIS — E78.2 MIXED HYPERLIPIDEMIA: ICD-10-CM

## 2025-03-17 DIAGNOSIS — E27.40 ADRENAL INSUFFICIENCY: ICD-10-CM

## 2025-03-17 DIAGNOSIS — E55.9 VITAMIN D DEFICIENCY: ICD-10-CM

## 2025-03-17 DIAGNOSIS — Z79.52 CURRENT CHRONIC USE OF SYSTEMIC STEROIDS: ICD-10-CM

## 2025-03-17 PROCEDURE — 3077F SYST BP >= 140 MM HG: CPT | Performed by: INTERNAL MEDICINE

## 2025-03-17 PROCEDURE — 3078F DIAST BP <80 MM HG: CPT | Performed by: INTERNAL MEDICINE

## 2025-03-17 PROCEDURE — 99214 OFFICE O/P EST MOD 30 MIN: CPT | Performed by: INTERNAL MEDICINE

## 2025-03-17 PROCEDURE — 3044F HG A1C LEVEL LT 7.0%: CPT | Performed by: INTERNAL MEDICINE

## 2025-03-17 RX ORDER — DEXAMETHASONE 4 MG/1
2 TABLET ORAL
COMMUNITY
Start: 2025-03-07 | End: 2025-03-17 | Stop reason: ALTCHOICE

## 2025-03-17 RX ORDER — EZETIMIBE 10 MG/1
10 TABLET ORAL DAILY
Qty: 90 TABLET | Refills: 1 | Status: SHIPPED | OUTPATIENT
Start: 2025-03-17

## 2025-03-17 RX ORDER — INSULIN LISPRO 100 [IU]/ML
INJECTION, SOLUTION INTRAVENOUS; SUBCUTANEOUS
Qty: 120 ML | Refills: 3 | Status: SHIPPED | OUTPATIENT
Start: 2025-03-17

## 2025-03-17 RX ORDER — HYDROCORTISONE 10 MG/1
15 TABLET ORAL DAILY
Qty: 270 TABLET | Refills: 1
Start: 2025-03-17

## 2025-03-17 NOTE — PROGRESS NOTES
DEXA.    15.  Current chronic use of systemic steroids  Patient overall steroids in the past for cancer treatment protocols.  Started hydrocortisone 10/2024 for adrenal insufficiency.  Obtain DEXA.      Reviewed and/or ordered clinical lab results Yes  Reviewed and/or ordered radiology tests Yes  Reviewed and/or ordered other diagnostic tests No  Discussed test results with performing physician No  Independently reviewed image, tracing, or specimen No  Made a decision to obtain old records No  Reviewed and summarized old records Yes   Microalbumin creatinine ratio 115.4      AST 79  Hemoglobin A1c 8.0  History of hypercalcemia  25OHvitamin D 41-37.4-22.4  Calcium 10.4-10.5-9.8-9.6-9.9-9.8  Ionized calcium 1.2, normal  PTH 53.3  Phosphorus 3.9  Magnesium 2  No history of kidney stones  Patient has history of wrist fracture, but she stated that it was a hard fall.  No long termn steroids, no smoking  No RA  On HCTZ per patient report, recent medication.  24-hour urine cortisol 22.9, normal  Creatinine clearance 116  24-hour urine calcium 53  24-hour urine sodium 220  Serum protein electrophoresis normal    Obtained history from other than patient No    Patito DO Bleser was counseled regarding symptoms of diabetes, hyperlipidemia, hypertension, multinodular goiter, hyperthyroidism, adrenal insufficiency diagnosis, course and complications of disease if inadequately treated, side effects of medications, diagnosis, treatment options, and prognosis, risks, benefits, complications, and alternatives of treatment, labs, imaging and other studies and treatment targets and goals, immunotherapy endocrine related side effects..  She understands instructions and counseling.    These diagnosis were discussed and reviewed with the patient including the advantages of drug therapy. She was counseled at this visit on the following: diabetes complication prevention and foot care.    CGMS Download Review and

## 2025-03-18 DIAGNOSIS — E55.9 VITAMIN D DEFICIENCY: ICD-10-CM

## 2025-03-18 DIAGNOSIS — I10 PRIMARY HYPERTENSION: ICD-10-CM

## 2025-03-18 DIAGNOSIS — E10.40 TYPE 1 DIABETES, CONTROLLED, WITH NEUROPATHY (HCC): ICD-10-CM

## 2025-03-18 DIAGNOSIS — E05.90 HYPERTHYROIDISM: ICD-10-CM

## 2025-03-18 DIAGNOSIS — R63.4 WEIGHT LOSS: ICD-10-CM

## 2025-03-18 DIAGNOSIS — E78.2 MIXED HYPERLIPIDEMIA: ICD-10-CM

## 2025-03-18 DIAGNOSIS — E06.3 HASHIMOTO'S THYROIDITIS: ICD-10-CM

## 2025-03-18 RX ORDER — METOPROLOL SUCCINATE 50 MG/1
TABLET, EXTENDED RELEASE ORAL
Qty: 90 TABLET | Refills: 2 | Status: SHIPPED | OUTPATIENT
Start: 2025-03-18

## 2025-03-28 ENCOUNTER — PATIENT MESSAGE (OUTPATIENT)
Dept: ENDOCRINOLOGY | Age: 60
End: 2025-03-28

## 2025-03-28 DIAGNOSIS — E03.9 ACQUIRED HYPOTHYROIDISM: ICD-10-CM

## 2025-03-28 DIAGNOSIS — E10.69 TYPE 1 DIABETES MELLITUS WITH OTHER SPECIFIED COMPLICATION: ICD-10-CM

## 2025-03-28 DIAGNOSIS — E27.40 ADRENAL INSUFFICIENCY: Primary | ICD-10-CM

## 2025-04-01 NOTE — TELEPHONE ENCOUNTER
Dr. Saldana     I've had some changes already for my Brain Cancer.    Unfortunately not for the good.  3 Tumors are growing fast.  Have sufficient edema going on.  Causes a lot of weakness when trying to walk, and very tired with headaches.   Dr. Staton upped my Dexamethasone to 8mg every morning.  I started N insulin bc I think that's why sugar was high on 2-4mg in evening.  Do u want me to do anything else?  Started with 8 units at 11am.  Should I stay on 10mg hydrocortisone still?  Could you please send in prescription for N insulin to [facility] in [facility].     Thanks     Patito   
Left patient a message do not take hydrocortisone when on high-dose of dexamethasone.  Patient has central adrenal insufficiency, therefore dexamethasone is sufficient at this time.  Advised to obtain lab work, placed the orders.  Sent prescription for NPH insulin to pharmacy.  Advised patient increase NPH insulin based on blood glucose levels.    
Spoke with patient.  She is currently on 8 mg of dexamethasone and is injecting 12 units of NPH around that time when takes dexamethasone.  She is currently off Keytruda and will be off for 1 month.  She feels better after dexamethasone was started.  Patient will keep me updated.  
PA DIANA: Ua w/ moderate leuks, 17 WBC, 7 Epithelial cells, likely contamination, culture sent, hold on antibiotic. Pt treated with Diflucan, 2nd dose sent to pharmacy to take after 72 hrs from first dose. Pt is medically stable for discharge and follow up with PMD and GYN. The patient was given verbal and written discharge instructions. Specifically, instructions when to return to the ED and when to seek follow-up from their pcp was discussed. Any specialty follow-up was discussed, including how to make an appointment.  Instructions were discussed in simple, plain language and was understood by the patient. The patient understands that should their symptoms worsen or any new symptoms arise, they should return to the ED immediately for further evaluation. All pt's questions were answered. Patient verbalizes understanding.

## 2025-04-14 RX ORDER — ERGOCALCIFEROL 1.25 MG/1
50000 CAPSULE, LIQUID FILLED ORAL WEEKLY
Qty: 12 CAPSULE | Refills: 1 | Status: SHIPPED | OUTPATIENT
Start: 2025-04-14

## 2025-04-24 ENCOUNTER — TELEPHONE (OUTPATIENT)
Dept: ENDOCRINOLOGY | Age: 60
End: 2025-04-24

## 2025-04-24 DIAGNOSIS — E10.40 TYPE 1 DIABETES, CONTROLLED, WITH NEUROPATHY (HCC): ICD-10-CM

## 2025-04-24 RX ORDER — INSULIN PMP CART,AUT,G6/7,CNTR
EACH SUBCUTANEOUS
Qty: 45 EACH | Refills: 1 | Status: SHIPPED | OUTPATIENT
Start: 2025-04-24

## 2025-04-24 NOTE — TELEPHONE ENCOUNTER
Patient called requesting a refill, pt stated that she was recently seen in the hospital and she had to use a lot of her pods due to having to have scans done     Rx- Insulin Disposable Pump (OMNIPOD 5 [id number] PODS GEN 5)     Pharmacy- [id number]     LOV-  NOV- 6/17/25    Please advise

## 2025-04-29 ENCOUNTER — TELEPHONE (OUTPATIENT)
Dept: ENDOCRINOLOGY | Age: 60
End: 2025-04-29

## 2025-04-29 NOTE — TELEPHONE ENCOUNTER
Submitted PA for Omnipod Pods  [facility] Key: [id number] STATUS: PENDING.    Follow up done daily; if no decision with in three days we will refax.  If another three days goes by with no decision will call the insurance for status.

## 2025-04-30 NOTE — TELEPHONE ENCOUNTER
Approved on April 29 by [employer]  2017  CaseId:[id number];Status:Approved;Review Type:Prior Auth;Coverage Start Date:03/30/2025;Coverage End Date:04/29/2026;  Effective Date: 3/30/2025  Authorization Expiration Date: 4/29/2026    If this requires a response please respond to the pool ( P MHCX PSC MEDICATION PRE-AUTH).      Thank you please advise patient.

## 2025-05-01 ENCOUNTER — PATIENT MESSAGE (OUTPATIENT)
Dept: ENDOCRINOLOGY | Age: 60
End: 2025-05-01

## 2025-05-01 DIAGNOSIS — E27.40 ADRENAL INSUFFICIENCY: ICD-10-CM

## 2025-05-01 DIAGNOSIS — E03.9 ACQUIRED HYPOTHYROIDISM: Primary | ICD-10-CM

## 2025-05-01 NOTE — TELEPHONE ENCOUNTER
I spoke with patient.  She feels stable overall.  She had brain surgery, recovering.  Ordered free T4 by [facility] dialysis, as well as total T4, total T3, free T3 and cortisol.

## 2025-05-01 NOTE — TELEPHONE ENCOUNTER
Please advise. Updated in c.e.    Dr Les Staton ran my blood work at my appt yesterday and my Free T4 came back low   Do I need to do anything for this?    I am coming off my steriods, I am down to 20mg predisone.  Please let me know, I appreciate it.     Thanks     Patito

## 2025-05-02 DIAGNOSIS — E27.40 ADRENAL INSUFFICIENCY: ICD-10-CM

## 2025-05-02 DIAGNOSIS — E03.9 ACQUIRED HYPOTHYROIDISM: ICD-10-CM

## 2025-05-03 LAB
CORTIS AM PEAK SERPL-MCNC: 1 UG/DL (ref 4.3–22.4)
T3 SERPL-MCNC: 0.64 NG/ML (ref 0.8–2)
T3FREE SERPL-MCNC: 2 PG/ML (ref 2.3–4.2)
T4 SERPL-MCNC: 6.1 UG/DL (ref 4.5–10.9)

## 2025-05-05 ENCOUNTER — TELEPHONE (OUTPATIENT)
Dept: ENDOCRINOLOGY | Age: 60
End: 2025-05-05

## 2025-05-05 NOTE — TELEPHONE ENCOUNTER
[facility] [place] [facility] w/ critical result on cortisol AM = 1.0      CB# Varsha @[phone]   From [facility]

## 2025-05-05 NOTE — TELEPHONE ENCOUNTER
LVM to return call    Wanted to verify pt took dexamethasone tab night before testing. If so, labs normal

## 2025-05-07 ENCOUNTER — TELEPHONE (OUTPATIENT)
Dept: ENDOCRINOLOGY | Age: 60
End: 2025-05-07

## 2025-05-07 NOTE — TELEPHONE ENCOUNTER
Reviewed.  Note was faxed on 5/1/2025.  Patient saw oncologist today 5/7/2025.  Note has not been received yet.

## 2025-05-08 LAB — MISCELLANEOUS LAB TEST ORDER: NORMAL

## 2025-05-27 ENCOUNTER — PATIENT MESSAGE (OUTPATIENT)
Dept: ENDOCRINOLOGY | Age: 60
End: 2025-05-27

## 2025-05-27 NOTE — TELEPHONE ENCOUNTER
Good Morning      I just wanted to let u know that the increase in my thyroid meds, I feel fine.  No issues at all.  They are trying to lower my Dexamerhasone I'm down to 6mg daily, 4mg in morning 2mg in afternoon.  Do I need to to taking any hydrocortisone bc I am not on it and kind of feel like maybe I need it but don't know.  Please just let me know if I need any adjustments.     Thanks     Patito

## 2025-05-28 NOTE — TELEPHONE ENCOUNTER
I take 4mg in morning of Dex.  I take 2mg in afternoon of Dex.     What would be the conversion for that to start Hydrocortisone?

## 2025-05-30 NOTE — TELEPHONE ENCOUNTER
Messaged patient via [facility] to inform her that Dr. Ybarra will be returning to the office on 6/2. He will review her message and advise on how he'd like to proceed.   Reviewed.

## 2025-06-17 ENCOUNTER — OFFICE VISIT (OUTPATIENT)
Dept: ENDOCRINOLOGY | Age: 60
End: 2025-06-17
Payer: OTHER GOVERNMENT

## 2025-06-17 VITALS
HEIGHT: 61 IN | WEIGHT: 223 LBS | SYSTOLIC BLOOD PRESSURE: 122 MMHG | HEART RATE: 65 BPM | RESPIRATION RATE: 14 BRPM | DIASTOLIC BLOOD PRESSURE: 56 MMHG | TEMPERATURE: 98 F | BODY MASS INDEX: 42.1 KG/M2 | OXYGEN SATURATION: 98 %

## 2025-06-17 DIAGNOSIS — E66.813 CLASS 3 SEVERE OBESITY WITH SERIOUS COMORBIDITY AND BODY MASS INDEX (BMI) OF 40.0 TO 44.9 IN ADULT, UNSPECIFIED OBESITY TYPE (HCC): ICD-10-CM

## 2025-06-17 DIAGNOSIS — E04.2 MULTINODULAR GOITER: ICD-10-CM

## 2025-06-17 DIAGNOSIS — E55.9 VITAMIN D DEFICIENCY: ICD-10-CM

## 2025-06-17 DIAGNOSIS — Z78.0 MENOPAUSE: ICD-10-CM

## 2025-06-17 DIAGNOSIS — E06.3 HASHIMOTO'S THYROIDITIS: ICD-10-CM

## 2025-06-17 DIAGNOSIS — I10 PRIMARY HYPERTENSION: ICD-10-CM

## 2025-06-17 DIAGNOSIS — E10.69 TYPE 1 DIABETES MELLITUS WITH OTHER SPECIFIED COMPLICATION (HCC): ICD-10-CM

## 2025-06-17 DIAGNOSIS — E03.9 ACQUIRED HYPOTHYROIDISM: ICD-10-CM

## 2025-06-17 DIAGNOSIS — C43.9 MALIGNANT MELANOMA, UNSPECIFIED SITE (HCC): ICD-10-CM

## 2025-06-17 DIAGNOSIS — E27.40 ADRENAL INSUFFICIENCY: ICD-10-CM

## 2025-06-17 DIAGNOSIS — Z92.89 HISTORY OF IMMUNOTHERAPY: ICD-10-CM

## 2025-06-17 DIAGNOSIS — E78.2 MIXED HYPERLIPIDEMIA: ICD-10-CM

## 2025-06-17 DIAGNOSIS — E10.21 DIABETIC NEPHROPATHY ASSOCIATED WITH TYPE 1 DIABETES MELLITUS (HCC): ICD-10-CM

## 2025-06-17 DIAGNOSIS — E10.40 TYPE 1 DIABETES, CONTROLLED, WITH NEUROPATHY (HCC): Primary | ICD-10-CM

## 2025-06-17 DIAGNOSIS — Z79.52 CURRENT CHRONIC USE OF SYSTEMIC STEROIDS: ICD-10-CM

## 2025-06-17 DIAGNOSIS — Z86.39 HISTORY OF HYPERTHYROIDISM: ICD-10-CM

## 2025-06-17 PROBLEM — E66.9 OBESITY, UNSPECIFIED: Status: ACTIVE | Noted: 2025-06-17

## 2025-06-17 PROCEDURE — 99214 OFFICE O/P EST MOD 30 MIN: CPT | Performed by: INTERNAL MEDICINE

## 2025-06-17 PROCEDURE — 3074F SYST BP LT 130 MM HG: CPT | Performed by: INTERNAL MEDICINE

## 2025-06-17 PROCEDURE — 95251 CONT GLUC MNTR ANALYSIS I&R: CPT | Performed by: INTERNAL MEDICINE

## 2025-06-17 PROCEDURE — 3044F HG A1C LEVEL LT 7.0%: CPT | Performed by: INTERNAL MEDICINE

## 2025-06-17 PROCEDURE — 3078F DIAST BP <80 MM HG: CPT | Performed by: INTERNAL MEDICINE

## 2025-06-17 RX ORDER — HYDROCORTISONE SODIUM SUCCINATE 100 MG/2ML
INJECTION INTRAMUSCULAR; INTRAVENOUS
Qty: 2 ML | Refills: 3 | Status: SHIPPED | OUTPATIENT
Start: 2025-06-17

## 2025-06-17 RX ORDER — NYSTATIN 100000 [USP'U]/ML
500000 SUSPENSION ORAL 4 TIMES DAILY
COMMUNITY
Start: 2025-06-10

## 2025-06-17 RX ORDER — DEXAMETHASONE 4 MG/1
4 TABLET ORAL 2 TIMES DAILY WITH MEALS
COMMUNITY

## 2025-06-17 NOTE — PROGRESS NOTES
Valerie L Bleser is a 60 y.o. female who is being evaluated for Type 1 diabetes mellitus, adrenal insufficiency, hypothyroidism.     Current symptoms/problems include none and hyperglycemia and are unchanged.     Type 1 diabetes, controlled, with neuropathy (HCC) [E10.40]    Diagnosed with Type 1 diabetes mellitus in 1974.     Comorbid conditions: hyperlipidemia, HTN, Neuropathy and Nephropathy    Current diabetic medications include: Humalog  Omnipod 5/Dexcom 6    Intolerance to diabetes medications: No  Had melanoma, needs steroids at times    Weight trend: stable  Prior visit with dietician: yes  Current diet: on average, 3 meals per day  Current exercise: has back and hip pain, walks      Current monitoring regimen: home blood tests - 4 times daily  Has brought blood glucose log/meter:  Yes  Home blood sugar records: fasting range:  and postprandial range: 130-210   Any episodes of hypoglycemia? Yes  Hypoglycemia frequency and time(s):  rare  Does patient have Glucagon emergency kit? No  Does patient have rapid acting carbohydrate?  Yes  Does patient wear a medic alert bracelet or necklace?  No    2.  Diabetic nephropathy  No urination problems    3. Mixed hyperlipidemia  No muscle pain    4.  Obesity  Can not exercise because of back problems  On low carb diet, high fiber diet  Eats healthy    5. Multinodular goiter  No difficulty swallowing, voice change  No family history of thyroid cancer  No radiation the neck    6.  Hypertension  No headaches    7. Hashimoto's thyroiditis  Has fatigue    8.  Vitamin D deficiency  Has fatigue    9.  Malignant melanoma, unspecified site (HCC)  Patient received a treatment with nivolumab/ipilimumab on 4/10/2024 i  Patient developed side effects.  Patient was started on Braftovi and Mektovi on 6/7/2024 for dustin mts  Tolerating it well.  Patient underwent radiation treatment for brain MTS.    10.  History of hyperthyroidism   Patient received a treatment with

## 2025-06-30 ENCOUNTER — PATIENT MESSAGE (OUTPATIENT)
Dept: ENDOCRINOLOGY | Age: 60
End: 2025-06-30

## 2025-06-30 RX ORDER — PROCHLORPERAZINE 25 MG/1
SUPPOSITORY RECTAL
Qty: 1 EACH | Refills: 1 | Status: SHIPPED | OUTPATIENT
Start: 2025-06-30

## 2025-06-30 NOTE — TELEPHONE ENCOUNTER
Spoke with patient.  Advised that she needs to go to the hospital if she is vomiting and cannot keep her pills, or she has fever, or hypotension, or she is dehydrated because of nausea and diarrhea, or if she if she feels bad and is not sure what is going on.  She also can use Solu-Cortef injection to bridge between her symptoms and hospital.  Patient stated understanding.

## 2025-06-30 NOTE — TELEPHONE ENCOUNTER
Good afternoon      I was just wondering if I could get clarification on if while I'm on the steriod Dex 8mg would I get a adrenial crisis where I would have to take extra predisone ever?  How would I know to do so, I have the extra you gave me a while ago.  Im just getting clarification to know if I am I just extra nausea at times?    They are going to start taking me down slowly this week so I want to understand.     Thank you

## 2025-07-28 ENCOUNTER — PATIENT MESSAGE (OUTPATIENT)
Dept: ENDOCRINOLOGY | Age: 60
End: 2025-07-28

## 2025-07-29 RX ORDER — HYDROCORTISONE 20 MG/1
TABLET ORAL
Qty: 60 TABLET | Refills: 1 | Status: SHIPPED | OUTPATIENT
Start: 2025-07-29 | End: 2025-07-30

## 2025-07-29 NOTE — TELEPHONE ENCOUNTER
Please advise patient: I will send prescription to pharmacy for 20 mg hydrocortisone tablets, take 2 tablets twice a day.  Second dose no later than 5 PM.  In 1 week, decrease to 40 mg in a.m. and 20 mg in p.m.  In third week decrease to 20 mg +10 mg.  Let me know if that at that point you would like me to send 10 mg tablets as well.  If anytime during tapering feels worse, go back to the previous dose.  If any signs of sickness as sore throat, upper respiratory infection or similar, double or triple the dose for 2 or 3 days depending on severity.

## 2025-07-30 RX ORDER — DEXAMETHASONE 1 MG
3 TABLET ORAL 2 TIMES DAILY WITH MEALS
COMMUNITY

## 2025-07-30 NOTE — TELEPHONE ENCOUNTER
Only question I have is do I start this immediately while coming off Dexamethasone?  And how long do  I stay on the 20mg/10mg, which is 3rd week dosage.     Thanks

## 2025-07-30 NOTE — TELEPHONE ENCOUNTER
Spoke with patient.  She will taper her dexamethasone down until reaches 1 mg daily dose.  After 1 week on 1 mg dexamethasone, she will let me know and I will send prescription to pharmacy for hydrocortisone 10 mg tablets, take 2 tablets in a.m. and 1 tablet in p.m.  Then will need to obtain lab work.  At that point we will need lab work.  Will discontinue 20 mg hydrocortisone prescription.  If she does not feel any time during the tapering or changing to hydrocortisone, may need higher dose, therefore she will let me know.

## 2025-08-08 RX ORDER — LEVOTHYROXINE SODIUM 100 UG/1
100 TABLET ORAL DAILY
Qty: 30 TABLET | Refills: 2 | Status: SHIPPED | OUTPATIENT
Start: 2025-08-08

## 2025-09-04 DIAGNOSIS — E10.40 TYPE 1 DIABETES, CONTROLLED, WITH NEUROPATHY (HCC): ICD-10-CM

## 2025-09-04 RX ORDER — PROCHLORPERAZINE 25 MG/1
SUPPOSITORY RECTAL
Qty: 9 EACH | Refills: 1 | Status: SHIPPED | OUTPATIENT
Start: 2025-09-04